# Patient Record
Sex: MALE | Race: WHITE | NOT HISPANIC OR LATINO | URBAN - METROPOLITAN AREA
[De-identification: names, ages, dates, MRNs, and addresses within clinical notes are randomized per-mention and may not be internally consistent; named-entity substitution may affect disease eponyms.]

---

## 2017-06-05 ENCOUNTER — OUTPATIENT (OUTPATIENT)
Dept: OUTPATIENT SERVICES | Facility: HOSPITAL | Age: 67
LOS: 1 days | Discharge: HOME | End: 2017-06-05

## 2017-06-05 DIAGNOSIS — K21.9 GASTRO-ESOPHAGEAL REFLUX DISEASE WITHOUT ESOPHAGITIS: ICD-10-CM

## 2017-06-05 DIAGNOSIS — E78.1 PURE HYPERGLYCERIDEMIA: ICD-10-CM

## 2017-06-05 DIAGNOSIS — N40.0 BENIGN PROSTATIC HYPERPLASIA WITHOUT LOWER URINARY TRACT SYMPTOMS: ICD-10-CM

## 2017-06-05 DIAGNOSIS — K85.90 ACUTE PANCREATITIS WITHOUT NECROSIS OR INFECTION, UNSPECIFIED: ICD-10-CM

## 2017-06-28 DIAGNOSIS — E55.9 VITAMIN D DEFICIENCY, UNSPECIFIED: ICD-10-CM

## 2017-06-28 DIAGNOSIS — E78.2 MIXED HYPERLIPIDEMIA: ICD-10-CM

## 2017-06-28 DIAGNOSIS — E11.9 TYPE 2 DIABETES MELLITUS WITHOUT COMPLICATIONS: ICD-10-CM

## 2017-11-20 ENCOUNTER — OUTPATIENT (OUTPATIENT)
Dept: OUTPATIENT SERVICES | Facility: HOSPITAL | Age: 67
LOS: 1 days | Discharge: HOME | End: 2017-11-20

## 2017-11-20 DIAGNOSIS — E78.1 PURE HYPERGLYCERIDEMIA: ICD-10-CM

## 2017-11-20 DIAGNOSIS — E78.2 MIXED HYPERLIPIDEMIA: ICD-10-CM

## 2017-11-20 DIAGNOSIS — E11.65 TYPE 2 DIABETES MELLITUS WITH HYPERGLYCEMIA: ICD-10-CM

## 2017-11-20 DIAGNOSIS — N40.0 BENIGN PROSTATIC HYPERPLASIA WITHOUT LOWER URINARY TRACT SYMPTOMS: ICD-10-CM

## 2017-11-20 DIAGNOSIS — K85.90 ACUTE PANCREATITIS WITHOUT NECROSIS OR INFECTION, UNSPECIFIED: ICD-10-CM

## 2017-11-20 DIAGNOSIS — E55.9 VITAMIN D DEFICIENCY, UNSPECIFIED: ICD-10-CM

## 2017-11-20 DIAGNOSIS — K21.9 GASTRO-ESOPHAGEAL REFLUX DISEASE WITHOUT ESOPHAGITIS: ICD-10-CM

## 2017-12-01 ENCOUNTER — OUTPATIENT (OUTPATIENT)
Dept: OUTPATIENT SERVICES | Facility: HOSPITAL | Age: 67
LOS: 1 days | Discharge: HOME | End: 2017-12-01

## 2017-12-01 DIAGNOSIS — K85.90 ACUTE PANCREATITIS WITHOUT NECROSIS OR INFECTION, UNSPECIFIED: ICD-10-CM

## 2017-12-01 DIAGNOSIS — E83.52 HYPERCALCEMIA: ICD-10-CM

## 2017-12-01 DIAGNOSIS — N40.0 BENIGN PROSTATIC HYPERPLASIA WITHOUT LOWER URINARY TRACT SYMPTOMS: ICD-10-CM

## 2017-12-01 DIAGNOSIS — K21.9 GASTRO-ESOPHAGEAL REFLUX DISEASE WITHOUT ESOPHAGITIS: ICD-10-CM

## 2017-12-01 DIAGNOSIS — E78.1 PURE HYPERGLYCERIDEMIA: ICD-10-CM

## 2017-12-15 ENCOUNTER — OUTPATIENT (OUTPATIENT)
Dept: OUTPATIENT SERVICES | Facility: HOSPITAL | Age: 67
LOS: 1 days | Discharge: HOME | End: 2017-12-15

## 2017-12-15 DIAGNOSIS — E78.1 PURE HYPERGLYCERIDEMIA: ICD-10-CM

## 2017-12-15 DIAGNOSIS — N40.0 BENIGN PROSTATIC HYPERPLASIA WITHOUT LOWER URINARY TRACT SYMPTOMS: ICD-10-CM

## 2017-12-15 DIAGNOSIS — K21.9 GASTRO-ESOPHAGEAL REFLUX DISEASE WITHOUT ESOPHAGITIS: ICD-10-CM

## 2017-12-15 DIAGNOSIS — E21.0 PRIMARY HYPERPARATHYROIDISM: ICD-10-CM

## 2017-12-15 DIAGNOSIS — K85.90 ACUTE PANCREATITIS WITHOUT NECROSIS OR INFECTION, UNSPECIFIED: ICD-10-CM

## 2018-02-06 ENCOUNTER — OUTPATIENT (OUTPATIENT)
Dept: OUTPATIENT SERVICES | Facility: HOSPITAL | Age: 68
LOS: 1 days | Discharge: HOME | End: 2018-02-06

## 2018-02-06 DIAGNOSIS — Z00.00 ENCOUNTER FOR GENERAL ADULT MEDICAL EXAMINATION WITHOUT ABNORMAL FINDINGS: ICD-10-CM

## 2018-05-02 ENCOUNTER — OUTPATIENT (OUTPATIENT)
Dept: OUTPATIENT SERVICES | Facility: HOSPITAL | Age: 68
LOS: 1 days | Discharge: HOME | End: 2018-05-02

## 2018-05-02 DIAGNOSIS — E07.9 DISORDER OF THYROID, UNSPECIFIED: ICD-10-CM

## 2018-07-10 ENCOUNTER — OUTPATIENT (OUTPATIENT)
Dept: OUTPATIENT SERVICES | Facility: HOSPITAL | Age: 68
LOS: 1 days | Discharge: HOME | End: 2018-07-10

## 2018-07-10 DIAGNOSIS — E21.0 PRIMARY HYPERPARATHYROIDISM: ICD-10-CM

## 2018-07-10 DIAGNOSIS — E55.9 VITAMIN D DEFICIENCY, UNSPECIFIED: ICD-10-CM

## 2018-07-10 DIAGNOSIS — E78.2 MIXED HYPERLIPIDEMIA: ICD-10-CM

## 2018-07-10 DIAGNOSIS — E11.9 TYPE 2 DIABETES MELLITUS WITHOUT COMPLICATIONS: ICD-10-CM

## 2019-01-29 ENCOUNTER — OUTPATIENT (OUTPATIENT)
Dept: OUTPATIENT SERVICES | Facility: HOSPITAL | Age: 69
LOS: 1 days | Discharge: HOME | End: 2019-01-29

## 2019-01-29 DIAGNOSIS — N39.0 URINARY TRACT INFECTION, SITE NOT SPECIFIED: ICD-10-CM

## 2019-01-29 DIAGNOSIS — Z13.1 ENCOUNTER FOR SCREENING FOR DIABETES MELLITUS: ICD-10-CM

## 2019-01-29 DIAGNOSIS — Z00.00 ENCOUNTER FOR GENERAL ADULT MEDICAL EXAMINATION WITHOUT ABNORMAL FINDINGS: ICD-10-CM

## 2019-01-29 DIAGNOSIS — E78.5 HYPERLIPIDEMIA, UNSPECIFIED: ICD-10-CM

## 2019-01-29 DIAGNOSIS — D51.8 OTHER VITAMIN B12 DEFICIENCY ANEMIAS: ICD-10-CM

## 2019-01-29 DIAGNOSIS — D53.9 NUTRITIONAL ANEMIA, UNSPECIFIED: ICD-10-CM

## 2019-01-29 DIAGNOSIS — E03.9 HYPOTHYROIDISM, UNSPECIFIED: ICD-10-CM

## 2019-01-29 DIAGNOSIS — E55.9 VITAMIN D DEFICIENCY, UNSPECIFIED: ICD-10-CM

## 2019-06-14 ENCOUNTER — OUTPATIENT (OUTPATIENT)
Dept: OUTPATIENT SERVICES | Facility: HOSPITAL | Age: 69
LOS: 1 days | Discharge: HOME | End: 2019-06-14

## 2019-06-14 DIAGNOSIS — E21.0 PRIMARY HYPERPARATHYROIDISM: ICD-10-CM

## 2019-06-14 DIAGNOSIS — E11.9 TYPE 2 DIABETES MELLITUS WITHOUT COMPLICATIONS: ICD-10-CM

## 2019-06-24 ENCOUNTER — OUTPATIENT (OUTPATIENT)
Dept: OUTPATIENT SERVICES | Facility: HOSPITAL | Age: 69
LOS: 1 days | Discharge: HOME | End: 2019-06-24

## 2019-06-24 DIAGNOSIS — E21.0 PRIMARY HYPERPARATHYROIDISM: ICD-10-CM

## 2019-07-25 ENCOUNTER — OUTPATIENT (OUTPATIENT)
Dept: OUTPATIENT SERVICES | Facility: HOSPITAL | Age: 69
LOS: 1 days | Discharge: HOME | End: 2019-07-25

## 2019-07-26 DIAGNOSIS — Z13.820 ENCOUNTER FOR SCREENING FOR OSTEOPOROSIS: ICD-10-CM

## 2019-07-26 DIAGNOSIS — F17.200 NICOTINE DEPENDENCE, UNSPECIFIED, UNCOMPLICATED: ICD-10-CM

## 2019-07-26 DIAGNOSIS — M89.9 DISORDER OF BONE, UNSPECIFIED: ICD-10-CM

## 2019-08-26 ENCOUNTER — OUTPATIENT (OUTPATIENT)
Dept: OUTPATIENT SERVICES | Facility: HOSPITAL | Age: 69
LOS: 1 days | Discharge: HOME | End: 2019-08-26

## 2019-08-26 DIAGNOSIS — E11.9 TYPE 2 DIABETES MELLITUS WITHOUT COMPLICATIONS: ICD-10-CM

## 2019-08-26 DIAGNOSIS — E55.9 VITAMIN D DEFICIENCY, UNSPECIFIED: ICD-10-CM

## 2019-08-26 DIAGNOSIS — E21.0 PRIMARY HYPERPARATHYROIDISM: ICD-10-CM

## 2019-08-26 DIAGNOSIS — E83.52 HYPERCALCEMIA: ICD-10-CM

## 2022-10-06 ENCOUNTER — NON-APPOINTMENT (OUTPATIENT)
Age: 72
End: 2022-10-06

## 2023-08-28 ENCOUNTER — INPATIENT (INPATIENT)
Facility: HOSPITAL | Age: 73
LOS: 2 days | Discharge: HOME | DRG: 445 | End: 2023-08-31
Attending: INTERNAL MEDICINE | Admitting: FAMILY MEDICINE
Payer: MEDICARE

## 2023-08-28 VITALS
WEIGHT: 195.11 LBS | TEMPERATURE: 101 F | SYSTOLIC BLOOD PRESSURE: 135 MMHG | RESPIRATION RATE: 20 BRPM | DIASTOLIC BLOOD PRESSURE: 70 MMHG | OXYGEN SATURATION: 95 %

## 2023-08-28 DIAGNOSIS — R10.9 UNSPECIFIED ABDOMINAL PAIN: ICD-10-CM

## 2023-08-28 LAB
ALBUMIN SERPL ELPH-MCNC: 4.6 G/DL — SIGNIFICANT CHANGE UP (ref 3.5–5.2)
ALP SERPL-CCNC: 32 U/L — SIGNIFICANT CHANGE UP (ref 30–115)
ALT FLD-CCNC: 23 U/L — SIGNIFICANT CHANGE UP (ref 0–41)
ANION GAP SERPL CALC-SCNC: 16 MMOL/L — HIGH (ref 7–14)
APPEARANCE UR: ABNORMAL
AST SERPL-CCNC: 26 U/L — SIGNIFICANT CHANGE UP (ref 0–41)
BACTERIA # UR AUTO: ABNORMAL /HPF
BASE EXCESS BLDV CALC-SCNC: 2.2 MMOL/L — SIGNIFICANT CHANGE UP (ref -2–3)
BASOPHILS # BLD AUTO: 0.05 K/UL — SIGNIFICANT CHANGE UP (ref 0–0.2)
BASOPHILS NFR BLD AUTO: 0.5 % — SIGNIFICANT CHANGE UP (ref 0–1)
BILIRUB SERPL-MCNC: 0.5 MG/DL — SIGNIFICANT CHANGE UP (ref 0.2–1.2)
BILIRUB UR-MCNC: ABNORMAL
BUN SERPL-MCNC: 19 MG/DL — SIGNIFICANT CHANGE UP (ref 10–20)
CA-I SERPL-SCNC: 1.25 MMOL/L — SIGNIFICANT CHANGE UP (ref 1.15–1.33)
CALCIUM SERPL-MCNC: 10.9 MG/DL — HIGH (ref 8.4–10.5)
CHLORIDE SERPL-SCNC: 94 MMOL/L — LOW (ref 98–110)
CO2 SERPL-SCNC: 24 MMOL/L — SIGNIFICANT CHANGE UP (ref 17–32)
COLOR SPEC: SIGNIFICANT CHANGE UP
CREAT SERPL-MCNC: 0.9 MG/DL — SIGNIFICANT CHANGE UP (ref 0.7–1.5)
DIFF PNL FLD: NEGATIVE — SIGNIFICANT CHANGE UP
EGFR: 90 ML/MIN/1.73M2 — SIGNIFICANT CHANGE UP
EOSINOPHIL # BLD AUTO: 0 K/UL — SIGNIFICANT CHANGE UP (ref 0–0.7)
EOSINOPHIL NFR BLD AUTO: 0 % — SIGNIFICANT CHANGE UP (ref 0–8)
EPI CELLS # UR: PRESENT
FLUAV AG NPH QL: SIGNIFICANT CHANGE UP
FLUBV AG NPH QL: SIGNIFICANT CHANGE UP
GAS PNL BLDV: 131 MMOL/L — LOW (ref 136–145)
GAS PNL BLDV: SIGNIFICANT CHANGE UP
GLUCOSE SERPL-MCNC: 124 MG/DL — HIGH (ref 70–99)
GLUCOSE UR QL: NEGATIVE MG/DL — SIGNIFICANT CHANGE UP
GRAN CASTS # UR COMP ASSIST: PRESENT
HCO3 BLDV-SCNC: 27 MMOL/L — SIGNIFICANT CHANGE UP (ref 22–29)
HCT VFR BLD CALC: 45.3 % — SIGNIFICANT CHANGE UP (ref 42–52)
HCT VFR BLDA CALC: 41 % — SIGNIFICANT CHANGE UP (ref 39–51)
HGB BLD CALC-MCNC: 13.5 G/DL — SIGNIFICANT CHANGE UP (ref 12.6–17.4)
HGB BLD-MCNC: 15.2 G/DL — SIGNIFICANT CHANGE UP (ref 14–18)
HYALINE CASTS # UR AUTO: 7 — SIGNIFICANT CHANGE UP
IMM GRANULOCYTES NFR BLD AUTO: 0.3 % — SIGNIFICANT CHANGE UP (ref 0.1–0.3)
KETONES UR-MCNC: ABNORMAL MG/DL
LACTATE BLDV-MCNC: 1.3 MMOL/L — SIGNIFICANT CHANGE UP (ref 0.5–2)
LACTATE SERPL-SCNC: 1.4 MMOL/L — SIGNIFICANT CHANGE UP (ref 0.7–2)
LACTATE SERPL-SCNC: 4.3 MMOL/L — CRITICAL HIGH (ref 0.7–2)
LEUKOCYTE ESTERASE UR-ACNC: ABNORMAL
LIDOCAIN IGE QN: 20 U/L — SIGNIFICANT CHANGE UP (ref 7–60)
LYMPHOCYTES # BLD AUTO: 0.88 K/UL — LOW (ref 1.2–3.4)
LYMPHOCYTES # BLD AUTO: 8.4 % — LOW (ref 20.5–51.1)
MCHC RBC-ENTMCNC: 29.2 PG — SIGNIFICANT CHANGE UP (ref 27–31)
MCHC RBC-ENTMCNC: 33.6 G/DL — SIGNIFICANT CHANGE UP (ref 32–37)
MCV RBC AUTO: 87.1 FL — SIGNIFICANT CHANGE UP (ref 80–94)
MONOCYTES # BLD AUTO: 1.15 K/UL — HIGH (ref 0.1–0.6)
MONOCYTES NFR BLD AUTO: 11 % — HIGH (ref 1.7–9.3)
MUCOUS THREADS # UR AUTO: PRESENT
NEUTROPHILS # BLD AUTO: 8.31 K/UL — HIGH (ref 1.4–6.5)
NEUTROPHILS NFR BLD AUTO: 79.8 % — HIGH (ref 42.2–75.2)
NITRITE UR-MCNC: NEGATIVE — SIGNIFICANT CHANGE UP
NRBC # BLD: 0 /100 WBCS — SIGNIFICANT CHANGE UP (ref 0–0)
PCO2 BLDV: 43 MMHG — SIGNIFICANT CHANGE UP (ref 42–55)
PH BLDV: 7.41 — SIGNIFICANT CHANGE UP (ref 7.32–7.43)
PH UR: 6 — SIGNIFICANT CHANGE UP (ref 5–8)
PLATELET # BLD AUTO: 187 K/UL — SIGNIFICANT CHANGE UP (ref 130–400)
PMV BLD: 12.6 FL — HIGH (ref 7.4–10.4)
PO2 BLDV: 34 MMHG — SIGNIFICANT CHANGE UP
POTASSIUM BLDV-SCNC: 4.4 MMOL/L — SIGNIFICANT CHANGE UP (ref 3.5–5.1)
POTASSIUM SERPL-MCNC: 4.8 MMOL/L — SIGNIFICANT CHANGE UP (ref 3.5–5)
POTASSIUM SERPL-SCNC: 4.8 MMOL/L — SIGNIFICANT CHANGE UP (ref 3.5–5)
PROT SERPL-MCNC: 7.5 G/DL — SIGNIFICANT CHANGE UP (ref 6–8)
PROT UR-MCNC: 30 MG/DL
RBC # BLD: 5.2 M/UL — SIGNIFICANT CHANGE UP (ref 4.7–6.1)
RBC # FLD: 13.1 % — SIGNIFICANT CHANGE UP (ref 11.5–14.5)
RBC CASTS # UR COMP ASSIST: 3 /HPF — SIGNIFICANT CHANGE UP (ref 0–4)
RSV RNA NPH QL NAA+NON-PROBE: SIGNIFICANT CHANGE UP
SAO2 % BLDV: 55.1 % — SIGNIFICANT CHANGE UP
SARS-COV-2 RNA SPEC QL NAA+PROBE: SIGNIFICANT CHANGE UP
SODIUM SERPL-SCNC: 134 MMOL/L — LOW (ref 135–146)
SP GR SPEC: 1.03 — SIGNIFICANT CHANGE UP (ref 1–1.03)
SPERM AB SPEC-ACNC: PRESENT
TROPONIN T SERPL-MCNC: <0.01 NG/ML — SIGNIFICANT CHANGE UP
UROBILINOGEN FLD QL: 1 MG/DL — SIGNIFICANT CHANGE UP (ref 0.2–1)
WBC # BLD: 10.42 K/UL — SIGNIFICANT CHANGE UP (ref 4.8–10.8)
WBC # FLD AUTO: 10.42 K/UL — SIGNIFICANT CHANGE UP (ref 4.8–10.8)
WBC UR QL: 3 /HPF — SIGNIFICANT CHANGE UP (ref 0–5)

## 2023-08-28 PROCEDURE — 83690 ASSAY OF LIPASE: CPT

## 2023-08-28 PROCEDURE — 71045 X-RAY EXAM CHEST 1 VIEW: CPT | Mod: 26

## 2023-08-28 PROCEDURE — 80053 COMPREHEN METABOLIC PANEL: CPT

## 2023-08-28 PROCEDURE — 93005 ELECTROCARDIOGRAM TRACING: CPT

## 2023-08-28 PROCEDURE — 86901 BLOOD TYPING SEROLOGIC RH(D): CPT

## 2023-08-28 PROCEDURE — 85025 COMPLETE CBC W/AUTO DIFF WBC: CPT

## 2023-08-28 PROCEDURE — 83735 ASSAY OF MAGNESIUM: CPT

## 2023-08-28 PROCEDURE — 83605 ASSAY OF LACTIC ACID: CPT

## 2023-08-28 PROCEDURE — 83036 HEMOGLOBIN GLYCOSYLATED A1C: CPT

## 2023-08-28 PROCEDURE — 86850 RBC ANTIBODY SCREEN: CPT

## 2023-08-28 PROCEDURE — 82962 GLUCOSE BLOOD TEST: CPT

## 2023-08-28 PROCEDURE — 84484 ASSAY OF TROPONIN QUANT: CPT

## 2023-08-28 PROCEDURE — 99497 ADVNCD CARE PLAN 30 MIN: CPT | Mod: 25

## 2023-08-28 PROCEDURE — 85610 PROTHROMBIN TIME: CPT

## 2023-08-28 PROCEDURE — 93010 ELECTROCARDIOGRAM REPORT: CPT | Mod: 76

## 2023-08-28 PROCEDURE — 78226 HEPATOBILIARY SYSTEM IMAGING: CPT

## 2023-08-28 PROCEDURE — 85027 COMPLETE CBC AUTOMATED: CPT

## 2023-08-28 PROCEDURE — 84100 ASSAY OF PHOSPHORUS: CPT

## 2023-08-28 PROCEDURE — 76705 ECHO EXAM OF ABDOMEN: CPT | Mod: 26

## 2023-08-28 PROCEDURE — 36415 COLL VENOUS BLD VENIPUNCTURE: CPT

## 2023-08-28 PROCEDURE — 74177 CT ABD & PELVIS W/CONTRAST: CPT | Mod: 26,MA

## 2023-08-28 PROCEDURE — 86900 BLOOD TYPING SEROLOGIC ABO: CPT

## 2023-08-28 PROCEDURE — 99285 EMERGENCY DEPT VISIT HI MDM: CPT

## 2023-08-28 PROCEDURE — 99223 1ST HOSP IP/OBS HIGH 75: CPT

## 2023-08-28 PROCEDURE — 93306 TTE W/DOPPLER COMPLETE: CPT

## 2023-08-28 PROCEDURE — 84443 ASSAY THYROID STIM HORMONE: CPT

## 2023-08-28 PROCEDURE — A9537: CPT

## 2023-08-28 PROCEDURE — 86803 HEPATITIS C AB TEST: CPT

## 2023-08-28 RX ORDER — ASPIRIN/CALCIUM CARB/MAGNESIUM 324 MG
0 TABLET ORAL
Refills: 0 | DISCHARGE

## 2023-08-28 RX ORDER — ACETAMINOPHEN 500 MG
650 TABLET ORAL EVERY 6 HOURS
Refills: 0 | Status: DISCONTINUED | OUTPATIENT
Start: 2023-08-28 | End: 2023-08-31

## 2023-08-28 RX ORDER — DEXTROSE 50 % IN WATER 50 %
12.5 SYRINGE (ML) INTRAVENOUS ONCE
Refills: 0 | Status: DISCONTINUED | OUTPATIENT
Start: 2023-08-28 | End: 2023-08-31

## 2023-08-28 RX ORDER — ONDANSETRON 8 MG/1
4 TABLET, FILM COATED ORAL EVERY 8 HOURS
Refills: 0 | Status: DISCONTINUED | OUTPATIENT
Start: 2023-08-28 | End: 2023-08-31

## 2023-08-28 RX ORDER — SODIUM CHLORIDE 9 MG/ML
1000 INJECTION, SOLUTION INTRAVENOUS
Refills: 0 | Status: DISCONTINUED | OUTPATIENT
Start: 2023-08-28 | End: 2023-08-31

## 2023-08-28 RX ORDER — PANTOPRAZOLE SODIUM 20 MG/1
40 TABLET, DELAYED RELEASE ORAL
Refills: 0 | Status: DISCONTINUED | OUTPATIENT
Start: 2023-08-28 | End: 2023-08-31

## 2023-08-28 RX ORDER — ACETAMINOPHEN 500 MG
975 TABLET ORAL ONCE
Refills: 0 | Status: COMPLETED | OUTPATIENT
Start: 2023-08-28 | End: 2023-08-28

## 2023-08-28 RX ORDER — ASPIRIN/CALCIUM CARB/MAGNESIUM 324 MG
81 TABLET ORAL DAILY
Refills: 0 | Status: DISCONTINUED | OUTPATIENT
Start: 2023-08-28 | End: 2023-08-31

## 2023-08-28 RX ORDER — DEXTROSE 50 % IN WATER 50 %
25 SYRINGE (ML) INTRAVENOUS ONCE
Refills: 0 | Status: DISCONTINUED | OUTPATIENT
Start: 2023-08-28 | End: 2023-08-31

## 2023-08-28 RX ORDER — PIPERACILLIN AND TAZOBACTAM 4; .5 G/20ML; G/20ML
3.38 INJECTION, POWDER, LYOPHILIZED, FOR SOLUTION INTRAVENOUS EVERY 8 HOURS
Refills: 0 | Status: DISCONTINUED | OUTPATIENT
Start: 2023-08-29 | End: 2023-08-31

## 2023-08-28 RX ORDER — PIPERACILLIN AND TAZOBACTAM 4; .5 G/20ML; G/20ML
3.38 INJECTION, POWDER, LYOPHILIZED, FOR SOLUTION INTRAVENOUS ONCE
Refills: 0 | Status: COMPLETED | OUTPATIENT
Start: 2023-08-28 | End: 2023-08-28

## 2023-08-28 RX ORDER — SODIUM CHLORIDE 9 MG/ML
1000 INJECTION INTRAMUSCULAR; INTRAVENOUS; SUBCUTANEOUS ONCE
Refills: 0 | Status: COMPLETED | OUTPATIENT
Start: 2023-08-28 | End: 2023-08-28

## 2023-08-28 RX ORDER — LANOLIN ALCOHOL/MO/W.PET/CERES
5 CREAM (GRAM) TOPICAL AT BEDTIME
Refills: 0 | Status: DISCONTINUED | OUTPATIENT
Start: 2023-08-28 | End: 2023-08-31

## 2023-08-28 RX ORDER — ATORVASTATIN CALCIUM 80 MG/1
20 TABLET, FILM COATED ORAL AT BEDTIME
Refills: 0 | Status: DISCONTINUED | OUTPATIENT
Start: 2023-08-28 | End: 2023-08-31

## 2023-08-28 RX ORDER — ASPIRIN/CALCIUM CARB/MAGNESIUM 324 MG
1 TABLET ORAL
Refills: 0 | DISCHARGE

## 2023-08-28 RX ORDER — DEXTROSE 50 % IN WATER 50 %
15 SYRINGE (ML) INTRAVENOUS ONCE
Refills: 0 | Status: DISCONTINUED | OUTPATIENT
Start: 2023-08-28 | End: 2023-08-31

## 2023-08-28 RX ORDER — PIOGLITAZONE HYDROCHLORIDE 15 MG/1
1 TABLET ORAL
Refills: 0 | DISCHARGE

## 2023-08-28 RX ORDER — INSULIN LISPRO 100/ML
VIAL (ML) SUBCUTANEOUS
Refills: 0 | Status: DISCONTINUED | OUTPATIENT
Start: 2023-08-28 | End: 2023-08-31

## 2023-08-28 RX ORDER — GABAPENTIN 400 MG/1
100 CAPSULE ORAL DAILY
Refills: 0 | Status: DISCONTINUED | OUTPATIENT
Start: 2023-08-28 | End: 2023-08-31

## 2023-08-28 RX ORDER — MECLIZINE HCL 12.5 MG
12.5 TABLET ORAL EVERY 8 HOURS
Refills: 0 | Status: DISCONTINUED | OUTPATIENT
Start: 2023-08-28 | End: 2023-08-29

## 2023-08-28 RX ORDER — GLUCAGON INJECTION, SOLUTION 0.5 MG/.1ML
1 INJECTION, SOLUTION SUBCUTANEOUS ONCE
Refills: 0 | Status: DISCONTINUED | OUTPATIENT
Start: 2023-08-28 | End: 2023-08-31

## 2023-08-28 RX ORDER — ACETAMINOPHEN 500 MG
650 TABLET ORAL ONCE
Refills: 0 | Status: COMPLETED | OUTPATIENT
Start: 2023-08-28 | End: 2023-08-28

## 2023-08-28 RX ADMIN — Medication 650 MILLIGRAM(S): at 16:46

## 2023-08-28 RX ADMIN — Medication 975 MILLIGRAM(S): at 13:32

## 2023-08-28 RX ADMIN — SODIUM CHLORIDE 1000 MILLILITER(S): 9 INJECTION INTRAMUSCULAR; INTRAVENOUS; SUBCUTANEOUS at 11:47

## 2023-08-28 RX ADMIN — Medication 975 MILLIGRAM(S): at 11:27

## 2023-08-28 RX ADMIN — SODIUM CHLORIDE 1000 MILLILITER(S): 9 INJECTION INTRAMUSCULAR; INTRAVENOUS; SUBCUTANEOUS at 11:24

## 2023-08-28 RX ADMIN — PIPERACILLIN AND TAZOBACTAM 200 GRAM(S): 4; .5 INJECTION, POWDER, LYOPHILIZED, FOR SOLUTION INTRAVENOUS at 16:43

## 2023-08-28 NOTE — ED ADULT NURSE NOTE - NSFALLUNIVINTERV_ED_ALL_ED
Bed/Stretcher in lowest position, wheels locked, appropriate side rails in place/Call bell, personal items and telephone in reach/Instruct patient to call for assistance before getting out of bed/chair/stretcher/Non-slip footwear applied when patient is off stretcher/[place] to call system/Physically safe environment - no spills, clutter or unnecessary equipment/Purposeful proactive rounding/Room/bathroom lighting operational, light cord in reach

## 2023-08-28 NOTE — H&P ADULT - NSHPLABSRESULTS_GEN_ALL_CORE
15.2   10.42 )-----------( 187      ( 28 Aug 2023 10:30 )             45.3       08-28    134<L>  |  94<L>  |  19  ----------------------------<  124<H>  4.8   |  24  |  0.9    Ca    10.9<H>      28 Aug 2023 10:30    TPro  7.5  /  Alb  4.6  /  TBili  0.5  /  DBili  x   /  AST  26  /  ALT  23  /  AlkPhos  32  08-28        Urinalysis Basic - ( 28 Aug 2023 10:30 )    Color: x / Appearance: x / SG: x / pH: x  Gluc: 124 mg/dL / Ketone: x  / Bili: x / Urobili: x   Blood: x / Protein: x / Nitrite: x   Leuk Esterase: x / RBC: x / WBC x   Sq Epi: x / Non Sq Epi: x / Bacteria: x  Lactate Trend  08-28 @ 13:32 Lactate:1.4   08-28 @ 10:30 Lactate:4.3       CARDIAC MARKERS ( 28 Aug 2023 10:30 )  x     / <0.01 ng/mL / x     / x     / x          CT IMPRESSION:    Cholelithiasis with mild gallbladder wall thickening. Findings are   indeterminate. Recommend abdominal ultrasound for further evaluation.  FINDINGS:  Liver: Echogenic liver compatible with steatosis. Hypoechoic region along   the gallbladder likely representing focal fatty sparing.  Bile ducts: Normal caliber. Common bile duct measures 5 mm.  Gallbladder: Cholelithiasis and sludge. Negative sonographic Garcia's   sign.  Pancreas: Poorly visualized.  Right kidney: 13.4 cm. No hydronephrosis. 3.7 cm cyst.  Ascites: None.      US IIMPRESSION:    Cholelithiasis and sludge without sonographic evidence of acute   cholecystitis

## 2023-08-28 NOTE — ED PROVIDER NOTE - ATTENDING APP SHARED VISIT CONTRIBUTION OF CARE
72-year-old male history of diabetes GERD dyslipidemia presents valuation of abdominal fullness fever.  The nurse triage note documents chest pain patient currently has no chest pain that was previously.  He on exam patient in no distress S1-S2 clear to auscultation bilaterally mild abdominal signs with no appreciated tenderness neurologic grossly intact  Impression  Patient with fever of unclear etiology as well as with abdominal discomfort.  Here white count 10 initial lactate of 4.3 with repeat within normal limits.  UA with no signs of infection and chest x-ray within normal limits.  CT on pelvis demonstrated cholelithiasis and mild gallbladder wall thickening which are indeterminate.  Patient is pending right quadrant ultrasound and possible surgery consult.

## 2023-08-28 NOTE — H&P ADULT - NSHPPHYSICALEXAM_GEN_ALL_CORE
GENERAL:  71y/o Male NAD, resting comfortably.  HEAD:  Atraumatic, Normocephalic  EYES: EOMI, PERRLA, conjunctiva and sclera clear  NECK: Supple, No JVD, no cervical lymphadenopathy, non-tender  CHEST/LUNG: Clear to auscultation bilaterally; No wheeze, rhonchi, or rales  HEART: Regular rate and rhythm; S1&S2  ABDOMEN: Soft, mildly tender on mid and right upper quadrant, Nondistended x 4 quadrants; Bowel sounds present  EXTREMITIES:   Peripheral Pulses Present, No clubbing, no cyanosis, or no edema, no calf tenderness  PSYCH: AAOx3, cooperative, appropriate  NEUROLOGY: WNL  SKIN: WNL Vital Signs Last 24 Hrs  T(C): 36.3 (28 Aug 2023 22:56), Max: 38.4 (28 Aug 2023 09:50)  T(F): 97.4 (28 Aug 2023 22:56), Max: 101.2 (28 Aug 2023 09:50)  HR: 79 (28 Aug 2023 22:56) (67 - 79)  BP: 178/91 (28 Aug 2023 22:56) (119/63 - 178/91)  BP(mean): --  RR: 18 (28 Aug 2023 22:56) (18 - 22)  SpO2: 95% (28 Aug 2023 22:56) (95% - 96%)    Parameters below as of 28 Aug 2023 22:56  Patient On (Oxygen Delivery Method): room air          GENERAL:  71y/o Male NAD, resting comfortably.  HEAD:  Atraumatic, Normocephalic  EYES: EOMI, PERRLA, conjunctiva and sclera clear  NECK: Supple, No JVD, no cervical lymphadenopathy, non-tender  CHEST/LUNG: Clear to auscultation bilaterally; No wheeze, rhonchi, or rales  HEART: Regular rate and rhythm; S1&S2  ABDOMEN: Soft, mildly tender on mid and right upper quadrant, Nondistended x 4 quadrants; Bowel sounds present  EXTREMITIES:   Peripheral Pulses Present, No clubbing, no cyanosis, or no edema, no calf tenderness  PSYCH: AAOx3, cooperative, appropriate  NEUROLOGY: WNL  SKIN: WNL

## 2023-08-28 NOTE — H&P ADULT - ASSESSMENT
DX cholelitiasis:  Gi consult  NPO  ABX Zosyn IV started in ED    continue home medications as PMD:          Prophylaxis Gi/VTE    CAse D/W Dr Cope 71 y/o male  seen at bed side with surgical PA . Pt with medical history of Type 2 diabetes, High cholesterol, GERD, Hx of Pancreatitis, Everyday heavy smoker (2-3 PPD), and peripheral neuropathy who presents with his wife to the ER with c/o now resolved chest pain, epigastric pain and fever to 102 at home and constipation x 4 days.   Patient reports the chest pain and epigastric pain started 3-4 days ago and now has been resolved for over 24 hours but he developed a fever to 102 at home so his wife brought him to the ER for evaluation.    Patient reports a history of Pancreatitis x 2 but was told they could not find a cause.  Patient denies being told he had gallstones in the past,  patient denies alcohol use.  He reports Meclizine may had caused one episode of pancreatitis.    Patient reports the pain has now resolved > 24 hours but was located in epigastric area with radiation towards mid chest.    Pain was characterized as sharp in nature and was 8/10.      DX cholelitiasis:  Gi consult  NPO  ABX Zosyn IV started in ED    continue home medications as PMD:          Prophylaxis Gi/VTE    CAse D/W Dr Cope Patient is 71 y/o male  with pMHX of Type 2 diabetes, High cholesterol, GERD, Hx of Pancreatitis, Everyday heavy smoker (2-3 PPD), and peripheral neuropathy who presents with       #Abdm pain/fever  #lactic acidosis resolved  -Abdm CT scan shows Cholelithiasis with mild gallbladder wall thickening. Findings are indeterminate. Recommend abdominal ultrasound for further evaluation.  Essentially stable splenomegaly.  -abdm US shows Cholelithiasis and sludge without sonographic evidence of acute cholecystitis.  Hepatic steatosis  -RVP negative   -NPO  -IVF  -IV antibiotic pending cultures  -GI and surgery consult   -HIDA scan     #DM2  -continue with lantus, and ISS  -Monitor POC     #HLD/neuropathy/GERD  -continue with home medications        #Progress Note Handoff  Pending (specify):  as above   Family discussion:  plan of care was discussed with patient   in details.  all questions were answered.  seems to understand, and in agreement  Disposition:  home          Patient is 71 y/o male  with pMHX of Type 2 diabetes, High cholesterol, GERD, Hx of Pancreatitis, Everyday heavy smoker (2-3 PPD), and peripheral neuropathy who presents with       #Abdm pain/fever  #lactic acidosis resolved  -Abdm CT scan shows Cholelithiasis with mild gallbladder wall thickening. Findings are indeterminate. Recommend abdominal ultrasound for further evaluation.  Essentially stable splenomegaly.  -abdm US shows Cholelithiasis and sludge without sonographic evidence of acute cholecystitis.  Hepatic steatosis  -RVP negative   -NPO  -IVF  -IV antibiotic pending cultures  -GI and surgery consult   -HIDA scan     #DM2  -continue with lantus, and ISS  -Monitor POC     #HLD/neuropathy/GERD  -continue with home medications      #Chest pain  -EKG shows no acute ischemic change  -Trop negative x1  -Will obtain serial trop, TTE      #Progress Note Handoff  Pending (specify):  as above   Family discussion:  plan of care was discussed with patient   in details.  all questions were answered.  seems to understand, and in agreement  Disposition:  home          Patient is 71 y/o male  with pMHX of Type 2 diabetes, High cholesterol, GERD, Hx of Pancreatitis, Everyday heavy smoker (2-3 PPD), and peripheral neuropathy who presents with       #Abdm pain/fever  #lactic acidosis resolved  -Abdm CT scan shows Cholelithiasis with mild gallbladder wall thickening. Findings are indeterminate. Recommend abdominal ultrasound for further evaluation.  Essentially stable splenomegaly.  -abdm US shows Cholelithiasis and sludge without sonographic evidence of acute cholecystitis.  Hepatic steatosis  -RVP negative   -NPO  -IVF  -IV antibiotic pending cultures  -GI and surgery consult   -HIDA scan     #DM2  -Hold oral medications,  ISS  -Monitor POC     #HLD/neuropathy/GERD  -continue with home medications      #Chest pain  -EKG shows no acute ischemic change  -Trop negative x1  -Will obtain serial trop, TTE      #Progress Note Handoff  Pending (specify):  as above   Family discussion:  plan of care was discussed with patient   in details.  all questions were answered.  seems to understand, and in agreement  Disposition:  home          Patient is 71 y/o male  with pMHX of Type 2 diabetes, High cholesterol, GERD, Hx of Pancreatitis, Everyday heavy smoker (2-3 PPD), and peripheral neuropathy who presents with       #Abdm pain/fever  #lactic acidosis resolved  -Abdm CT scan shows Cholelithiasis with mild gallbladder wall thickening. Findings are indeterminate. Recommend abdominal ultrasound for further evaluation.  Essentially stable splenomegaly.  -abdm US shows Cholelithiasis and sludge without sonographic evidence of acute cholecystitis.  Hepatic steatosis  -RVP negative   -NPO  -IVF  -IV antibiotic pending cultures  -GI and surgery consult   -HIDA scan     #DM2  -Hold oral medications,  ISS  -Monitor POC   FS AC/HS   insulin scale    #HLD/neuropathy/GERD  -continue with home medications      #Chest pain  -EKG shows no acute ischemic change  -Trop negative x1  -Will obtain serial trop, TTE      #Progress Note Handoff  Pending (specify):  as above   Family discussion:  plan of care was discussed with patient   in details.  all questions were answered.  seems to understand, and in agreement  Disposition:  home

## 2023-08-28 NOTE — ED PROVIDER NOTE - PHYSICAL EXAMINATION
CONST: Well appearing in NAD  EYES: PERRL, EOMI, Sclera and conjunctiva clear.   ENT: No nasal discharge. Oropharynx normal appearing  NECK: Non-tender, no meningeal signs. normal ROM. supple   CARD: Normal S1 S2; Normal rate and rhythm  RESP: Equal BS B/L, No wheezes, rhonchi or rales. No distress  GI: Soft, abdominal tenderness, non-distended. no cva tenderness. normal BS  MS: Normal ROM in all extremities. No midline spinal tenderness. pulses 2 +. no calf tenderness or swelling  SKIN: Warm, dry, no acute rashes. Good turgor  NEURO: A&Ox3, No focal deficits.

## 2023-08-28 NOTE — CONSULT NOTE ADULT - CONSULT REASON
Abdominal pain, possible Crohn's exacerbation vs early PSBO Fever, resolved epigastric pain Fever, epigastric pain (now resolved)

## 2023-08-28 NOTE — ED ADULT NURSE NOTE - NSICDXPASTMEDICALHX_GEN_ALL_CORE_FT
PAST MEDICAL HISTORY:  Acid reflux     BP (high blood pressure)     Elevated cholesterol with high triglycerides     High cholesterol     Pancreatitis

## 2023-08-28 NOTE — CONSULT NOTE ADULT - NSCONSULTADDITIONALINFOA_GEN_ALL_CORE
I have seen and examined the patient.  In addition I note the following.  The patient has a history of previous episodes of pancreatitis which he had yesterday.  He also has gallstones.  He had an episode of abdominal pain discomfort nausea vomiting couple weeks back which certainly sounds like a biliary colic type episode.  He has no evidence for other GI pathology clinically or per history at this time.  His HIDA scan is positive with nonfilling of the gallbladder.  I certainly think that his sense of anorexia may indeed be related to this and I will offer him cholecystectomy.  We will plan to discuss it further tomorrow.  GI and medicine notes appreciated.  Thank you very much for allowing me to assist in the care of this gentleman.

## 2023-08-28 NOTE — PATIENT PROFILE ADULT - FALL HARM RISK - UNIVERSAL INTERVENTIONS
Bed in lowest position, wheels locked, appropriate side rails in place/Call bell, personal items and telephone in reach/Instruct patient to call for assistance before getting out of bed or chair/Non-slip footwear when patient is out of bed/[place] to call system/Physically safe environment - no spills, clutter or unnecessary equipment/Purposeful Proactive Rounding/Room/bathroom lighting operational, light cord in reach

## 2023-08-28 NOTE — ED PROVIDER NOTE - CLINICAL SUMMARY MEDICAL DECISION MAKING FREE TEXT BOX
Pt evaluated after sign out. Comfortable. Work up reviewed. Will admit for GI consult, HIDA scan and treatment.

## 2023-08-28 NOTE — ED PROVIDER NOTE - OBJECTIVE STATEMENT
72 year old male with pmhx of dm, hld, gerd, pancreatitis, peripheral neuropathy presents to ed with chest pain, epigastric pain and fever. Pt admits had chest pain a few days ago which has now revolved. abdominal pain is to diffuse abdomen associated with bloating x 4 days. + fever at home today of 102. Pt denies nausea, vomiting, diarrhea, urinary symptoms, cough, congestion, recent travel or sick contacts.

## 2023-08-28 NOTE — H&P ADULT - HISTORY OF PRESENT ILLNESS
72-year-old male history of diabetes GERD dyslipidemia presents valuation of abdominal fullness fever.  The nurse triage note documents chest pain patient currently has no chest pain    73 y/o male  seen at bed side with surgical PA . Pt with medical history of Type 2 diabetes, High cholesterol, GERD, Hx of Pancreatitis, Everyday heavy smoker (2-3 PPD), and peripheral neuropathy who presents with his wife to the ER with c/o now resolved chest pain, epigastric pain and fever to 102 at home and constipation x 4 days.   Patient reports the chest pain and epigastric pain started 3-4 days ago and now has been resolved for over 24 hours but he developed a fever to 102 at home so his wife brought him to the ER for evaluation.    Patient reports a history of Pancreatitis x 2 but was told they could not find a cause.  Patient denies being told he had gallstones in the past,  patient denies alcohol use.  He reports Meclizine may had caused one episode of pancreatitis.    Patient reports the pain has now resolved > 24 hours but was located in epigastric area with radiation towards mid chest.    Pain was characterized as sharp in nature and was 8/10.    Patient though he was having another episode of pancreatitis.    Patient denies N/V/D, hematemesis, hematochezia, melena, urinary complaints or SO Patient is 71 y/o male with past medical  history of Type 2 diabetes, High cholesterol, GERD, Hx of Pancreatitis, Everyday heavy smoker (2-3 PPD), and peripheral neuropathy who presents with his wife to the ER with c/o now resolved chest pain, epigastric pain and fever to 102 at home and constipation x 4 days.   Patient reports the chest pain and epigastric pain started 3-4 days ago and now has been resolved for over 24 hours but he developed a fever to 102 at home so his wife brought him to the ER for evaluation.    Patient reports a history of Pancreatitis x 2 but was told they could not find a cause.  Patient denies being told he had gallstones in the past,  patient denies alcohol use.  He reports Meclizine may had caused one episode of pancreatitis.    Patient reports the pain has now resolved > 24 hours but was located in epigastric area with radiation towards mid chest.    Pain was characterized as sharp in nature and was 8/10.        Patient denies N/V/D, hematemesis, hematochezia, melena, urinary complaints or SO

## 2023-08-28 NOTE — CONSULT NOTE ADULT - ASSESSMENT
.  Impression:  Patient is a 73 y/o male with medical history of Type 2 diabetes, High cholesterol, GERD, Hx of Pancreatitis, Everyday heavy smoker (2-3 PPD), and peripheral neuropathy who presents with his wife to the ER with c/o now resolved chest pain, epigastric pain and fever to 102 at home and constipation x 4 days.  # Cholelithiasis and sludge without evidence of acute cholecystitis.  # Fever         Plan:   # Cholelithiasis and sludge without evidence of acute cholecystitis.  # Fever   - NPO except medications.  - IV hydration.   - Empiric antibiotics.  - Follow blood cultures taken in ER today.   - Monitor WBC count and LFT's.  - Pain medications as needed.   - Case d/w Dr. Marcelino and recommends admit to medical service for fever work up and management of his diabetes and other comorbidities and also recommends a HIDA scan to r/o acute cholecystitis.     - Dr. Marcelino to follow.  - Patient verbalizes understanding of plan and all questions answered to patient's satisfaction.   - Dr. Marcelino's recommendations relayed to ER PA.

## 2023-08-28 NOTE — CONSULT NOTE ADULT - SUBJECTIVE AND OBJECTIVE BOX
.  Patient is a 73 y/o male with medical history of Type 2 diabetes, High cholesterol, GERD, Hx of Pancreatitis, Everyday heavy smoker (2-3 PPD), and peripheral neuropathy who presents with his wife to the ER with c/o now resolved chest pain, epigastric pain and fever to 102 at home and constipation x 4 days.   Patient reports the chest pain and epigastric pain started 3-4 days ago and now has been resolved for over 24 hours but he developed a fever to 102 at home so his wife brought him to the ER for evaluation.    Patient reports a history of Pancreatitis x 2 but was told they could not find a cause.  Patient denies being told he had gallstones in the past,  patient denies alcohol use.  He reports Meclizine may had caused one episode of pancreatitis.    Patient reports the pain has now resolved > 24 hours but was located in epigastric area with radiation towards mid chest.    Pain was characterized as sharp in nature and was 8/10.    Patient though he was having another episode of pancreatitis.    Patient denies N/V/D, hematemesis, hematochezia, melena, urinary complaints or SOB.              PAST MEDICAL HISTORY:  High cholesterol    Elevated cholesterol with high triglycerides    Hx of Pancreatitis    Acid reflux    BP (high blood pressure)        PAST SURGICAL HISTORY:  Denies surgical history          Home Medications:  aspirin 81 mg oral delayed release capsule: orally (28 Aug 2023 22:27)  atorvastatin 20 mg oral tablet: 1 orally (28 Aug 2023 22:27)  gabapentin 100 mg oral capsule: 1 orally (28 Aug 2023 22:27)  metFORMIN 500 mg oral tablet: 1 orally (28 Aug 2023 22:27)  omeprazole 10 mg oral powder for reconstitution, delayed release: 1 orally (28 Aug 2023 22:27)  pioglitazone 30 mg oral tablet: 1 orally (28 Aug 2023 22:27)  Fish oil daily        Allergies  No Known Allergies        SOCIAL HISTORY:  Tobacco use:  Smokes 2-3 packs per day for over 50 years.  Unmotivated to quit.  Alcohol use:  Denies use  No illicit drug use        FAMILY HISTORY:  Mother: Skin cancer  Father DM        REVIEW OF SYSTEMS:  CONSTITUTIONAL:   + Fevers.   No chills or weakness.  EYES/ENT: No visual changes;  No vertigo or throat pain   NECK: No pain or stiffness  RESPIRATORY: No cough, wheezing, hemoptysis; No shortness of breath  CARDIOVASCULAR:  + Chest pain.  No palpitations  GASTROINTESTINAL:  + Epigastric pain.  No nausea, vomiting, or hematemesis; No diarrhea.  + Constipation.  No melena or hematochezia.  GENITOURINARY: No dysuria, frequency or hematuria  NEUROLOGICAL: No numbness or weakness  SKIN: No itching, burning, rashes, or lesions   All other review of systems is negative unless indicated above.          Vital Signs Last 24 Hrs  T(C): 38.1 (28 Aug 2023 16:43), Max: 38.4 (28 Aug 2023 09:50)  T(F): 100.5 (28 Aug 2023 16:43), Max: 101.2 (28 Aug 2023 09:50)  HR: 74 (28 Aug 2023 16:43) (67 - 74)  BP: 177/82 (28 Aug 2023 16:43) (119/63 - 177/82)  RR: 20 (28 Aug 2023 16:43) (20 - 22)  SpO2: 96% (28 Aug 2023 16:43) (95% - 96%)    Parameters below as of 28 Aug 2023 16:43  Patient On (Oxygen Delivery Method): room air        PHYSICAL EXAM:  General:  WD, WN, conversant in NAD, currently resting comfortably.  HEENT:  NC/AT, EOMI, normal hearing,  no LAD, neck supple.  Pulmonary:   CTA B/L, Normal respiratory effort.  Cardiovascular:  S1 & S2, NSR, no murmurs, rubs or gallops.  Abdominal:   + BS, soft,  No distention,  Non-tender.  No Rebound/guarding.  No Peritoneal signs.   Genitourinary:   No flank tenderness.  No suprapubic tenderness.    Extremities:  Normal strength, no clubbing/cyanosis/edema. Palpable distal pulses.   Neuro:  Awake, alert & oriented x 3, CNs II-XII grossly intact, normal sensation, no focal deficits.        LABS:                        15.2   10.42 )-----------( 187      ( 28 Aug 2023 10:30 )             45.3     08-28    134<L>  |  94<L>  |  19  ----------------------------<  124<H>  4.8   |  24  |  0.9    Ca    10.9<H>      28 Aug 2023 10:30    TPro  7.5  /  Alb  4.6  /  TBili  0.5  /  DBili  x   /  AST  26  /  ALT  23  /  AlkPhos  32  08-28        Urinalysis Basic - ( 28 Aug 2023 10:30 )    Color: x / Appearance: x / SG: x / pH: x  Gluc: 124 mg/dL / Ketone: x  / Bili: x / Urobili: x   Blood: x / Protein: x / Nitrite: x   Leuk Esterase: x / RBC: x / WBC x   Sq Epi: x / Non Sq Epi: x / Bacteria: x        Blood Cultures x 2 taken in ER.            US Abdomen Upper Quadrant Right (08.28.23 @ 17:09)   ACC: [id number] EXAM:  US ABDOMEN RT UPR QUADRANT   ORDERED BY: KENIANUHA LOYOLA     PROCEDURE DATE:  08/28/2023      INTERPRETATION:  CLINICAL INFORMATION: Gallstones    COMPARISON: CT dated 8/28/2023    TECHNIQUE: Sonography of the right upper quadrant.    FINDINGS:  Liver: Echogenic liver compatible with steatosis. Hypoechoic region along   the gallbladder likely representing focal fatty sparing.  Bile ducts: Normal caliber. Common bile duct measures 5 mm.  Gallbladder: Cholelithiasis and sludge. Negative sonographic Garcia's   sign.  Pancreas: Poorly visualized.  Right kidney: 13.4 cm. No hydronephrosis. 3.7 cm cyst.  Ascites: None.      IMPRESSION:    Cholelithiasis and sludge without sonographic evidence of acute   cholecystitis    Hepatic steatosis          CT Abdomen and Pelvis w/ IV Cont (08.28.23 @ 13:16)   ACC: [id number] EXAM:  CT ABDOMEN AND PELVIS IC   ORDERED BY: KENIANUHA LOYOLA     PROCEDURE DATE:  08/28/2023      INTERPRETATION:  Clinical Indication: Abdominal pain.    Technique: Multidetector-row CT images of the abdomen and pelvis were   obtained from the xiphoid through the symphysis pubis following the   administration of intravenous contrast. No oral contrast was   administered.  Coronal and sagittal reconstructions were performed.    Contrast: 95 cc Omnipaque 350 non-ionic intravenous contrast.    Comparison: CT abdomen pelvis 10/17/2013.    Findings:    01. LIVER: Normal morphology.  No focal lesion.  02. SPLEEN: Essentially stable splenomegaly measuring 14.6 cm  03. PANCREAS: Normal.  04. GALLBLADDER/BILIARY TREE: Cholelithiasis. Mildly thickened   gallbladder wall measuring 4 mm. No biliary dilation.  05. ADRENALS: Normal.  06. KIDNEYS: Symmetric enhancement.  No hydronephrosis, renal stone, or   soft tissue attenuation mass. Bilateral renal cysts.  07. LYMPHADENOPATHY/RETROPERITONEUM: No lymphadenopathy.  08. BOWEL: No bowel obstruction. Unremarkable appendix.  09. PELVIC VISCERA: Prostate measures 5.2 cm in the transverse dimension   (series 301 image 104). Under distended urinary bladder.  10. PELVIC LYMPH NODES: No lymphadenopathy.  11. VASCULATURE: Normal caliber abdominal aorta. Calcified   atherosclerotic disease of the aorta and its branches.  12. PERITONEUM/ABDOMINAL WALL: No ascites.  13. SKELETAL: Degenerative changes. Stable compression deformity of the   T12 vertebral body  14. LUNG BASES: Small bilateral atelectasis.        IMPRESSION:    Cholelithiasis with mild gallbladder wall thickening. Findings are   indeterminate. Recommend abdominal ultrasound for further evaluation.    Essentially stable splenomegaly.               .  Patient is a 73 y/o male with medical history of Type 2 diabetes, High cholesterol, GERD, Hx of Pancreatitis, Everyday heavy smoker (2-3 PPD), and peripheral neuropathy who presents with his wife to the ER with c/o now resolved chest pain, epigastric pain and fever to 102 at home and constipation x 4 days.   Patient reports the chest pain and epigastric pain started 3-4 days ago and now has been resolved for over 24 hours but he developed a fever to 102 at home so his wife brought him to the ER for evaluation.    Patient reports a history of Pancreatitis x 2 but was told they could not find a cause.  Patient denies being told he had gallstones in the past,  patient denies alcohol use.  He reports Meclizine may had caused one episode of pancreatitis.    Patient reports the pain has now resolved > 24 hours but was located in epigastric area with radiation towards mid chest.    Pain was characterized as sharp in nature and was 8/10.    Patient thought he was having another episode of pancreatitis.    Patient denies N/V/D, hematemesis, hematochezia, melena, urinary complaints or SOB.              PAST MEDICAL HISTORY:  High cholesterol    Elevated cholesterol with high triglycerides    Hx of Pancreatitis    Acid reflux    BP (high blood pressure)        PAST SURGICAL HISTORY:  Denies surgical history          Home Medications:  aspirin 81 mg oral delayed release capsule: orally (28 Aug 2023 22:27)  atorvastatin 20 mg oral tablet: 1 orally (28 Aug 2023 22:27)  gabapentin 100 mg oral capsule: 1 orally (28 Aug 2023 22:27)  metFORMIN 500 mg oral tablet: 1 orally (28 Aug 2023 22:27)  omeprazole 10 mg oral powder for reconstitution, delayed release: 1 orally (28 Aug 2023 22:27)  pioglitazone 30 mg oral tablet: 1 orally (28 Aug 2023 22:27)  Fish oil daily        Allergies  No Known Allergies        SOCIAL HISTORY:  Tobacco use:  Smokes 2-3 packs per day for over 50 years.  Unmotivated to quit.  Alcohol use:  Denies use  No illicit drug use        FAMILY HISTORY:  Mother: Skin cancer  Father DM        REVIEW OF SYSTEMS:  CONSTITUTIONAL:   + Fevers.   No chills or weakness.  EYES/ENT: No visual changes;  No vertigo or throat pain   NECK: No pain or stiffness  RESPIRATORY: No cough, wheezing, hemoptysis; No shortness of breath  CARDIOVASCULAR:  + Chest pain.  No palpitations  GASTROINTESTINAL:  + Epigastric pain.  No nausea, vomiting, or hematemesis; No diarrhea.  + Constipation.  No melena or hematochezia.  GENITOURINARY: No dysuria, frequency or hematuria  NEUROLOGICAL: No numbness or weakness  SKIN: No itching, burning, rashes, or lesions   All other review of systems is negative unless indicated above.          Vital Signs Last 24 Hrs  T(C): 38.1 (28 Aug 2023 16:43), Max: 38.4 (28 Aug 2023 09:50)  T(F): 100.5 (28 Aug 2023 16:43), Max: 101.2 (28 Aug 2023 09:50)  HR: 74 (28 Aug 2023 16:43) (67 - 74)  BP: 177/82 (28 Aug 2023 16:43) (119/63 - 177/82)  RR: 20 (28 Aug 2023 16:43) (20 - 22)  SpO2: 96% (28 Aug 2023 16:43) (95% - 96%)    Parameters below as of 28 Aug 2023 16:43  Patient On (Oxygen Delivery Method): room air        PHYSICAL EXAM:  General:  WD, WN, conversant in NAD, currently resting comfortably.  HEENT:  NC/AT, EOMI, normal hearing,  no LAD, neck supple.  Pulmonary:   CTA B/L, Normal respiratory effort.  Cardiovascular:  S1 & S2, NSR, no murmurs, rubs or gallops.  Abdominal:   + BS, soft,  No distention,  Non-tender.  No Rebound/guarding.  No Peritoneal signs.   Genitourinary:   No flank tenderness.  No suprapubic tenderness.    Extremities:  Normal strength, no clubbing/cyanosis/edema. Palpable distal pulses.   Neuro:  Awake, alert & oriented x 3, CNs II-XII grossly intact, normal sensation, no focal deficits.        LABS:                        15.2   10.42 )-----------( 187      ( 28 Aug 2023 10:30 )             45.3     08-28    134<L>  |  94<L>  |  19  ----------------------------<  124<H>  4.8   |  24  |  0.9    Ca    10.9<H>      28 Aug 2023 10:30    TPro  7.5  /  Alb  4.6  /  TBili  0.5  /  DBili  x   /  AST  26  /  ALT  23  /  AlkPhos  32  08-28        Urinalysis Basic - ( 28 Aug 2023 10:30 )    Color: x / Appearance: x / SG: x / pH: x  Gluc: 124 mg/dL / Ketone: x  / Bili: x / Urobili: x   Blood: x / Protein: x / Nitrite: x   Leuk Esterase: x / RBC: x / WBC x   Sq Epi: x / Non Sq Epi: x / Bacteria: x        Blood Cultures x 2 taken in ER.            US Abdomen Upper Quadrant Right (08.28.23 @ 17:09)   ACC: [id number] EXAM:  US ABDOMEN RT UPR QUADRANT   ORDERED BY: KENIANUHA LOYOLA     PROCEDURE DATE:  08/28/2023      INTERPRETATION:  CLINICAL INFORMATION: Gallstones    COMPARISON: CT dated 8/28/2023    TECHNIQUE: Sonography of the right upper quadrant.    FINDINGS:  Liver: Echogenic liver compatible with steatosis. Hypoechoic region along   the gallbladder likely representing focal fatty sparing.  Bile ducts: Normal caliber. Common bile duct measures 5 mm.  Gallbladder: Cholelithiasis and sludge. Negative sonographic Garcia's   sign.  Pancreas: Poorly visualized.  Right kidney: 13.4 cm. No hydronephrosis. 3.7 cm cyst.  Ascites: None.      IMPRESSION:    Cholelithiasis and sludge without sonographic evidence of acute   cholecystitis    Hepatic steatosis          CT Abdomen and Pelvis w/ IV Cont (08.28.23 @ 13:16)   ACC: [id number] EXAM:  CT ABDOMEN AND PELVIS IC   ORDERED BY: KENIANUHA LOYOLA     PROCEDURE DATE:  08/28/2023      INTERPRETATION:  Clinical Indication: Abdominal pain.    Technique: Multidetector-row CT images of the abdomen and pelvis were   obtained from the xiphoid through the symphysis pubis following the   administration of intravenous contrast. No oral contrast was   administered.  Coronal and sagittal reconstructions were performed.    Contrast: 95 cc Omnipaque 350 non-ionic intravenous contrast.    Comparison: CT abdomen pelvis 10/17/2013.    Findings:    01. LIVER: Normal morphology.  No focal lesion.  02. SPLEEN: Essentially stable splenomegaly measuring 14.6 cm  03. PANCREAS: Normal.  04. GALLBLADDER/BILIARY TREE: Cholelithiasis. Mildly thickened   gallbladder wall measuring 4 mm. No biliary dilation.  05. ADRENALS: Normal.  06. KIDNEYS: Symmetric enhancement.  No hydronephrosis, renal stone, or   soft tissue attenuation mass. Bilateral renal cysts.  07. LYMPHADENOPATHY/RETROPERITONEUM: No lymphadenopathy.  08. BOWEL: No bowel obstruction. Unremarkable appendix.  09. PELVIC VISCERA: Prostate measures 5.2 cm in the transverse dimension   (series 301 image 104). Under distended urinary bladder.  10. PELVIC LYMPH NODES: No lymphadenopathy.  11. VASCULATURE: Normal caliber abdominal aorta. Calcified   atherosclerotic disease of the aorta and its branches.  12. PERITONEUM/ABDOMINAL WALL: No ascites.  13. SKELETAL: Degenerative changes. Stable compression deformity of the   T12 vertebral body  14. LUNG BASES: Small bilateral atelectasis.        IMPRESSION:    Cholelithiasis with mild gallbladder wall thickening. Findings are   indeterminate. Recommend abdominal ultrasound for further evaluation.    Essentially stable splenomegaly.

## 2023-08-28 NOTE — ED PROVIDER NOTE - PROGRESS NOTE DETAILS
Agnieszka - S/o Dr. Nettie ALEXANDER f/u stefany lockwood, consider surgery consult and re-eval gallstones and gallbladder wall thickening, ruq sono and iv abx added on Pt seen and evaluated by Surgery PA and d/w Dr. Sheryl carter medical admission and HIDA and medical work up

## 2023-08-28 NOTE — ED PROVIDER NOTE - CARE PLAN
1 Principal Discharge DX:	Abdominal pain  Secondary Diagnosis:	Fever  Secondary Diagnosis:	Cholelithiasis  Secondary Diagnosis:	Chest pain

## 2023-08-29 DIAGNOSIS — K81.1 CHRONIC CHOLECYSTITIS: ICD-10-CM

## 2023-08-29 LAB
A1C WITH ESTIMATED AVERAGE GLUCOSE RESULT: 5.9 % — HIGH (ref 4–5.6)
ALBUMIN SERPL ELPH-MCNC: 3.9 G/DL — SIGNIFICANT CHANGE UP (ref 3.5–5.2)
ALP SERPL-CCNC: 27 U/L — LOW (ref 30–115)
ALT FLD-CCNC: 28 U/L — SIGNIFICANT CHANGE UP (ref 0–41)
ANION GAP SERPL CALC-SCNC: 10 MMOL/L — SIGNIFICANT CHANGE UP (ref 7–14)
AST SERPL-CCNC: 27 U/L — SIGNIFICANT CHANGE UP (ref 0–41)
BILIRUB SERPL-MCNC: 0.5 MG/DL — SIGNIFICANT CHANGE UP (ref 0.2–1.2)
BUN SERPL-MCNC: 14 MG/DL — SIGNIFICANT CHANGE UP (ref 10–20)
CALCIUM SERPL-MCNC: 9.7 MG/DL — SIGNIFICANT CHANGE UP (ref 8.4–10.5)
CHLORIDE SERPL-SCNC: 98 MMOL/L — SIGNIFICANT CHANGE UP (ref 98–110)
CO2 SERPL-SCNC: 26 MMOL/L — SIGNIFICANT CHANGE UP (ref 17–32)
CREAT SERPL-MCNC: 0.8 MG/DL — SIGNIFICANT CHANGE UP (ref 0.7–1.5)
CULTURE RESULTS: SIGNIFICANT CHANGE UP
EGFR: 93 ML/MIN/1.73M2 — SIGNIFICANT CHANGE UP
ESTIMATED AVERAGE GLUCOSE: 123 MG/DL — HIGH (ref 68–114)
GLUCOSE BLDC GLUCOMTR-MCNC: 94 MG/DL — SIGNIFICANT CHANGE UP (ref 70–99)
GLUCOSE BLDC GLUCOMTR-MCNC: 96 MG/DL — SIGNIFICANT CHANGE UP (ref 70–99)
GLUCOSE SERPL-MCNC: 92 MG/DL — SIGNIFICANT CHANGE UP (ref 70–99)
HCT VFR BLD CALC: 39 % — LOW (ref 42–52)
HCV AB S/CO SERPL IA: 0.04 COI — SIGNIFICANT CHANGE UP
HCV AB SERPL-IMP: SIGNIFICANT CHANGE UP
HGB BLD-MCNC: 13.1 G/DL — LOW (ref 14–18)
LACTATE SERPL-SCNC: 1.2 MMOL/L — SIGNIFICANT CHANGE UP (ref 0.7–2)
LIDOCAIN IGE QN: 19 U/L — SIGNIFICANT CHANGE UP (ref 7–60)
MAGNESIUM SERPL-MCNC: 1.4 MG/DL — LOW (ref 1.8–2.4)
MCHC RBC-ENTMCNC: 29 PG — SIGNIFICANT CHANGE UP (ref 27–31)
MCHC RBC-ENTMCNC: 33.6 G/DL — SIGNIFICANT CHANGE UP (ref 32–37)
MCV RBC AUTO: 86.5 FL — SIGNIFICANT CHANGE UP (ref 80–94)
NRBC # BLD: 0 /100 WBCS — SIGNIFICANT CHANGE UP (ref 0–0)
PHOSPHATE SERPL-MCNC: 2.2 MG/DL — SIGNIFICANT CHANGE UP (ref 2.1–4.9)
PLATELET # BLD AUTO: 187 K/UL — SIGNIFICANT CHANGE UP (ref 130–400)
PMV BLD: 11.6 FL — HIGH (ref 7.4–10.4)
POTASSIUM SERPL-MCNC: 4.1 MMOL/L — SIGNIFICANT CHANGE UP (ref 3.5–5)
POTASSIUM SERPL-SCNC: 4.1 MMOL/L — SIGNIFICANT CHANGE UP (ref 3.5–5)
PROT SERPL-MCNC: 6.5 G/DL — SIGNIFICANT CHANGE UP (ref 6–8)
RBC # BLD: 4.51 M/UL — LOW (ref 4.7–6.1)
RBC # FLD: 12.7 % — SIGNIFICANT CHANGE UP (ref 11.5–14.5)
SODIUM SERPL-SCNC: 134 MMOL/L — LOW (ref 135–146)
SPECIMEN SOURCE: SIGNIFICANT CHANGE UP
TROPONIN T SERPL-MCNC: <0.01 NG/ML — SIGNIFICANT CHANGE UP
WBC # BLD: 11.06 K/UL — HIGH (ref 4.8–10.8)
WBC # FLD AUTO: 11.06 K/UL — HIGH (ref 4.8–10.8)

## 2023-08-29 PROCEDURE — 99223 1ST HOSP IP/OBS HIGH 75: CPT

## 2023-08-29 PROCEDURE — 99233 SBSQ HOSP IP/OBS HIGH 50: CPT

## 2023-08-29 PROCEDURE — 78226 HEPATOBILIARY SYSTEM IMAGING: CPT | Mod: 26

## 2023-08-29 PROCEDURE — 93010 ELECTROCARDIOGRAM REPORT: CPT

## 2023-08-29 RX ORDER — SODIUM CHLORIDE 9 MG/ML
1000 INJECTION INTRAMUSCULAR; INTRAVENOUS; SUBCUTANEOUS
Refills: 0 | Status: DISCONTINUED | OUTPATIENT
Start: 2023-08-29 | End: 2023-08-31

## 2023-08-29 RX ORDER — HEPARIN SODIUM 5000 [USP'U]/ML
5000 INJECTION INTRAVENOUS; SUBCUTANEOUS EVERY 12 HOURS
Refills: 0 | Status: DISCONTINUED | OUTPATIENT
Start: 2023-08-29 | End: 2023-08-30

## 2023-08-29 RX ORDER — MAGNESIUM SULFATE 500 MG/ML
2 VIAL (ML) INJECTION ONCE
Refills: 0 | Status: COMPLETED | OUTPATIENT
Start: 2023-08-29 | End: 2023-08-29

## 2023-08-29 RX ADMIN — ATORVASTATIN CALCIUM 20 MILLIGRAM(S): 80 TABLET, FILM COATED ORAL at 22:42

## 2023-08-29 RX ADMIN — Medication 650 MILLIGRAM(S): at 09:49

## 2023-08-29 RX ADMIN — PIPERACILLIN AND TAZOBACTAM 200 GRAM(S): 4; .5 INJECTION, POWDER, LYOPHILIZED, FOR SOLUTION INTRAVENOUS at 00:08

## 2023-08-29 RX ADMIN — HEPARIN SODIUM 5000 UNIT(S): 5000 INJECTION INTRAVENOUS; SUBCUTANEOUS at 05:50

## 2023-08-29 RX ADMIN — Medication 25 GRAM(S): at 17:06

## 2023-08-29 RX ADMIN — PIPERACILLIN AND TAZOBACTAM 25 GRAM(S): 4; .5 INJECTION, POWDER, LYOPHILIZED, FOR SOLUTION INTRAVENOUS at 17:15

## 2023-08-29 RX ADMIN — HEPARIN SODIUM 5000 UNIT(S): 5000 INJECTION INTRAVENOUS; SUBCUTANEOUS at 18:10

## 2023-08-29 RX ADMIN — PIPERACILLIN AND TAZOBACTAM 25 GRAM(S): 4; .5 INJECTION, POWDER, LYOPHILIZED, FOR SOLUTION INTRAVENOUS at 22:42

## 2023-08-29 RX ADMIN — PANTOPRAZOLE SODIUM 40 MILLIGRAM(S): 20 TABLET, DELAYED RELEASE ORAL at 05:50

## 2023-08-29 RX ADMIN — SODIUM CHLORIDE 75 MILLILITER(S): 9 INJECTION INTRAMUSCULAR; INTRAVENOUS; SUBCUTANEOUS at 00:30

## 2023-08-29 NOTE — CONSULT NOTE ADULT - SUBJECTIVE AND OBJECTIVE BOX
Chief complaint/Reason for consult:   abdominal pain, fever  HPI:  Patient is 73 y/o male with past medical  history of Type 2 diabetes, High cholesterol, GERD, Hx of Pancreatitis, Everyday heavy smoker (2-3 PPD), and peripheral neuropathy who presents with his wife to the ER with c/o now resolved chest pain, epigastric pain and fever to 102 at home and constipation x 4 days.   Patient reports the chest pain and epigastric pain started 3-4 days ago and now has been resolved for over 24 hours but he developed a fever to 102 at home so his wife brought him to the ER for evaluation.    Patient reports a history of Pancreatitis x 2 but was told they could not find a cause.  Patient denies being told he had gallstones in the past,  patient denies alcohol use.  He reports Meclizine may had caused one episode of pancreatitis.    Patient reports the pain has now resolved > 24 hours but was located in epigastric area with radiation towards mid chest.    Pain was characterized as sharp in nature and was 8/10.        Patient denies N/V/D, hematemesis, hematochezia, melena, urinary complaints or SO (28 Aug 2023 22:29)    GI Updates: 73 y/o male with past medical  history of Type 2 diabetes, High cholesterol, GERD, Hx of Pancreatitis, Everyday heavy smoker (2-3 PPD), presents for fever and RUQ pain and chest pain on and off for months, patient reports recurrent pancreatitis episodes with no etiology. Currently Patient denies nausea, vomiting, hematemesis, melena, blood in stool, diarrhea, constipation, abdominal pain.      PAST MEDICAL & SURGICAL HISTORY:  High cholesterol      Elevated cholesterol with high triglycerides      Pancreatitis      Acid reflux      BP (high blood pressure)      No significant past surgical history            Family history:  FAMILY HISTORY:  No pertinent family history in first degree relatives      No GI cancers in first or second degree relatives    Social History: +cigarette smoking. No alcohol. No illegal drug use.    Allergies:   No Known Allergies  Intolerances      MEDICATIONS: Home Medications:  aspirin 81 mg oral delayed release capsule: orally (28 Aug 2023 22:27)  atorvastatin 20 mg oral tablet: 1 orally (28 Aug 2023 22:27)  gabapentin 100 mg oral capsule: 1 orally (28 Aug 2023 22:27)  meclizine 12.5 mg oral tablet: 1 orally (28 Aug 2023 22:27)  metFORMIN 500 mg oral tablet: 1 orally (28 Aug 2023 22:27)  omeprazole 10 mg oral powder for reconstitution, delayed release: 1 orally (28 Aug 2023 22:27)  pioglitazone 30 mg oral tablet: 1 orally (28 Aug 2023 22:27)    MEDICATIONS  (STANDING):  aspirin enteric coated 81 milliGRAM(s) Oral daily  atorvastatin 20 milliGRAM(s) Oral at bedtime  dextrose 5%. 1000 milliLiter(s) (50 mL/Hr) IV Continuous <Continuous>  dextrose 5%. 1000 milliLiter(s) (100 mL/Hr) IV Continuous <Continuous>  dextrose 50% Injectable 25 Gram(s) IV Push once  dextrose 50% Injectable 12.5 Gram(s) IV Push once  dextrose 50% Injectable 25 Gram(s) IV Push once  gabapentin 100 milliGRAM(s) Oral daily  glucagon  Injectable 1 milliGRAM(s) IntraMuscular once  heparin   Injectable 5000 Unit(s) SubCutaneous every 12 hours  insulin lispro (ADMELOG) corrective regimen sliding scale   SubCutaneous three times a day before meals  magnesium sulfate  IVPB 2 Gram(s) IV Intermittent once  pantoprazole    Tablet 40 milliGRAM(s) Oral before breakfast  piperacillin/tazobactam IVPB.. 3.375 Gram(s) IV Intermittent every 8 hours  sodium chloride 0.9%. 1000 milliLiter(s) (75 mL/Hr) IV Continuous <Continuous>    MEDICATIONS  (PRN):  acetaminophen     Tablet .. 650 milliGRAM(s) Oral every 6 hours PRN Temp greater or equal to 38C (100.4F), Mild Pain (1 - 3)  aluminum hydroxide/magnesium hydroxide/simethicone Suspension 30 milliLiter(s) Oral every 4 hours PRN Dyspepsia  dextrose Oral Gel 15 Gram(s) Oral once PRN Blood Glucose LESS THAN 70 milliGRAM(s)/deciliter  melatonin 5 milliGRAM(s) Oral at bedtime PRN Insomnia  ondansetron Injectable 4 milliGRAM(s) IV Push every 8 hours PRN Nausea and/or Vomiting        REVIEW OF SYSTEMS  General:  No weight loss, fevers, or chills.  Eyes:  No reported pain or visual changes  ENT:  No sore throat or runny nose.  NECK: No stiffness or lymphadenopathy  CV:  No chest pain or palpitations.  Resp:  No shortness of breath, cough, wheezing or hemoptysis  GI:  No abdominal pain, nausea, vomiting, dysphagia, diarrhea or constipation. No rectal bleeding, melena, or hematemesis.  Muscle:  No aches or weakness  Neuro:  No tingling, numbness       VITALS:   T(F): 96.8 (08-29-23 @ 15:47), Max: 100.5 (08-28-23 @ 16:43)  HR: 83 (08-29-23 @ 15:47) (68 - 83)  BP: 183/83 (08-29-23 @ 15:47) (167/75 - 183/83)  RR: 18 (08-29-23 @ 15:47) (18 - 20)  SpO2: 97% (08-29-23 @ 15:47) (95% - 97%)    PHYSICAL EXAM:  GENERAL: AAOx3, no acute distress.  HEAD:  Atraumatic, Normocephalic  EYES: conjunctiva and sclera clear  NECK: Supple, No thyromegaly   CHEST/LUNG: Clear to auscultation bilaterally; No wheeze, rhonchi, or rales  HEART: Regular rate and rhythm; normal S1, S2, No murmurs.  ABDOMEN: Soft, nontender, nondistended; Bowel sounds present  NEUROLOGY: No asterixis or tremor  SKIN: Intact, no jaundice          LABS:  08-29    134<L>  |  98  |  14  ----------------------------<  92  4.1   |  26  |  0.8    Ca    9.7      29 Aug 2023 06:31  Phos  2.2     08-29  Mg     1.4     08-29    TPro  6.5  /  Alb  3.9  /  TBili  0.5  /  DBili  x   /  AST  27  /  ALT  28  /  AlkPhos  27<L>  08-29                          13.1   11.06 )-----------( 187      ( 29 Aug 2023 06:31 )             39.0     LIVER FUNCTIONS - ( 29 Aug 2023 06:31 )  Alb: 3.9 g/dL / Pro: 6.5 g/dL / ALK PHOS: 27 U/L / ALT: 28 U/L / AST: 27 U/L / GGT: x               IMAGING:    < from: US Abdomen Upper Quadrant Right (08.28.23 @ 17:09) >    ACC: [id number] EXAM:  US ABDOMEN RT UPR QUADRANT   ORDERED BY: KENIA LOYOLA     PROCEDURE DATE:  08/28/2023          INTERPRETATION:  CLINICAL INFORMATION: Gallstones    COMPARISON: CT dated 8/28/2023    TECHNIQUE: Sonography of the right upper quadrant.    FINDINGS:  Liver: Echogenic liver compatible with steatosis. Hypoechoic region along   the gallbladder likely representing focal fatty sparing.  Bile ducts: Normal caliber. Common bile duct measures 5 mm.  Gallbladder: Cholelithiasis and sludge. Negative sonographic Garcia's   sign.  Pancreas: Poorly visualized.  Right kidney: 13.4 cm. No hydronephrosis. 3.7 cm cyst.  Ascites: None.      IMPRESSION:    Cholelithiasis and sludge without sonographic evidence of acute   cholecystitis    Hepatic steatosis    --- End of Report ---            ANGELINA AVALOS MD; Attending Radiologist  This document has been electronically signed. Aug 28 2023  7:00PM    < end of copied text >    < from: CT Abdomen and Pelvis w/ IV Cont (08.28.23 @ 13:16) >    ACC: [id number] EXAM:  CT ABDOMEN AND PELVIS IC   ORDERED BY: KENIA LOYOLA     PROCEDURE DATE:  08/28/2023          INTERPRETATION:  Clinical Indication: Abdominal pain.    Technique: Multidetector-row CT images of the abdomen and pelvis were   obtained from the xiphoid through the symphysis pubis following the   administration of intravenous contrast. No oral contrast was   administered.  Coronal and sagittal reconstructions were performed.    Contrast: 95 cc Omnipaque 350 non-ionic intravenous contrast.    Comparison: CT abdomen pelvis 10/17/2013.    Findings:    01. LIVER: Normal morphology.  No focal lesion.  02. SPLEEN: Essentially stable splenomegaly measuring 14.6 cm  03. PANCREAS: Normal.  04. GALLBLADDER/BILIARY TREE: Cholelithiasis.Mildly thickened   gallbladder wall measuring 4 mm. No biliary dilation.  05. ADRENALS: Normal.  06. KIDNEYS: Symmetric enhancement.  No hydronephrosis, renal stone, or   soft tissue attenuation mass. Bilateral renal cysts.  07. LYMPHADENOPATHY/RETROPERITONEUM: No lymphadenopathy.  08. BOWEL: No bowel obstruction. Unremarkable appendix.  09. PELVIC VISCERA: Prostate measures 5.2 cm in the transverse dimension   (series 301 image 104). Under distended urinary bladder.  10. PELVIC LYMPH NODES: No lymphadenopathy.  11. VASCULATURE: Normal caliber abdominal aorta. Calcified   atherosclerotic disease of the aorta and its branches.  12. PERITONEUM/ABDOMINAL WALL: No ascites.  13. SKELETAL: Degenerative changes. Stable compression deformity of the   T12 vertebral body  14. LUNG BASES: Small bilateral atelectasis.    IMPRESSION:    Cholelithiasis with mild gallbladder wall thickening. Findings are   indeterminate. Recommend abdominal ultrasound for further evaluation.    Essentially stable splenomegaly.      --- End of Report ---            CHELSEA HOLLOWAY MD; Attending Radiologist  This document has been electronically signed. Aug 28 2023  4:02PM    < end of copied text >       Chief complaint/Reason for consult: abdominal pain, fever  HPI:  Patient is 73 y/o male with past medical  history of Type 2 diabetes, High cholesterol, GERD, Hx of Pancreatitis, Everyday heavy smoker (2-3 PPD), and peripheral neuropathy who presents with his wife to the ER with c/o now resolved chest pain, epigastric pain and fever to 102 at home and constipation x 4 days.   Patient reports the chest pain and epigastric pain started 3-4 days ago and now has been resolved for over 24 hours but he developed a fever to 102 at home so his wife brought him to the ER for evaluation.    Patient reports a history of Pancreatitis x 2 but was told they could not find a cause.  Patient denies being told he had gallstones in the past,  patient denies alcohol use.  He reports Meclizine may had caused one episode of pancreatitis.    Patient reports the pain has now resolved > 24 hours but was located in epigastric area with radiation towards mid chest.    Pain was characterized as sharp in nature and was 8/10.        Patient denies N/V/D, hematemesis, hematochezia, melena, urinary complaints or SO (28 Aug 2023 22:29)    GI Updates: 73 y/o male with past medical  history of Type 2 diabetes, High cholesterol, GERD, Hx of Pancreatitis, Everyday heavy smoker (2-3 PPD), presents for fever and RUQ pain and chest pain on and off for months, patient reports recurrent pancreatitis episodes with no etiology. Currently Patient denies nausea, vomiting, hematemesis, melena, blood in stool, diarrhea, +constipation no abdominal pain.      PAST MEDICAL & SURGICAL HISTORY:  High cholesterol      Elevated cholesterol with high triglycerides      Pancreatitis      Acid reflux      BP (high blood pressure)      No significant past surgical history            Family history:  FAMILY HISTORY:  No pertinent family history in first degree relatives      No GI cancers in first or second degree relatives    Social History: +cigarette smoking. No alcohol. No illegal drug use.    Allergies:   No Known Allergies  Intolerances      MEDICATIONS: Home Medications:  aspirin 81 mg oral delayed release capsule: orally (28 Aug 2023 22:27)  atorvastatin 20 mg oral tablet: 1 orally (28 Aug 2023 22:27)  gabapentin 100 mg oral capsule: 1 orally (28 Aug 2023 22:27)  meclizine 12.5 mg oral tablet: 1 orally (28 Aug 2023 22:27)  metFORMIN 500 mg oral tablet: 1 orally (28 Aug 2023 22:27)  omeprazole 10 mg oral powder for reconstitution, delayed release: 1 orally (28 Aug 2023 22:27)  pioglitazone 30 mg oral tablet: 1 orally (28 Aug 2023 22:27)    MEDICATIONS  (STANDING):  aspirin enteric coated 81 milliGRAM(s) Oral daily  atorvastatin 20 milliGRAM(s) Oral at bedtime  dextrose 5%. 1000 milliLiter(s) (50 mL/Hr) IV Continuous <Continuous>  dextrose 5%. 1000 milliLiter(s) (100 mL/Hr) IV Continuous <Continuous>  dextrose 50% Injectable 25 Gram(s) IV Push once  dextrose 50% Injectable 12.5 Gram(s) IV Push once  dextrose 50% Injectable 25 Gram(s) IV Push once  gabapentin 100 milliGRAM(s) Oral daily  glucagon  Injectable 1 milliGRAM(s) IntraMuscular once  heparin   Injectable 5000 Unit(s) SubCutaneous every 12 hours  insulin lispro (ADMELOG) corrective regimen sliding scale   SubCutaneous three times a day before meals  magnesium sulfate  IVPB 2 Gram(s) IV Intermittent once  pantoprazole    Tablet 40 milliGRAM(s) Oral before breakfast  piperacillin/tazobactam IVPB.. 3.375 Gram(s) IV Intermittent every 8 hours  sodium chloride 0.9%. 1000 milliLiter(s) (75 mL/Hr) IV Continuous <Continuous>    MEDICATIONS  (PRN):  acetaminophen     Tablet .. 650 milliGRAM(s) Oral every 6 hours PRN Temp greater or equal to 38C (100.4F), Mild Pain (1 - 3)  aluminum hydroxide/magnesium hydroxide/simethicone Suspension 30 milliLiter(s) Oral every 4 hours PRN Dyspepsia  dextrose Oral Gel 15 Gram(s) Oral once PRN Blood Glucose LESS THAN 70 milliGRAM(s)/deciliter  melatonin 5 milliGRAM(s) Oral at bedtime PRN Insomnia  ondansetron Injectable 4 milliGRAM(s) IV Push every 8 hours PRN Nausea and/or Vomiting        REVIEW OF SYSTEMS  General:  No weight loss, fevers, or chills.  Eyes:  No reported pain or visual changes  ENT:  No sore throat or runny nose.  NECK: No stiffness or lymphadenopathy  CV:  No chest pain or palpitations.  Resp:  No shortness of breath, cough, wheezing or hemoptysis  GI:  No abdominal pain, nausea, vomiting, dysphagia, diarrhea + constipation. No rectal bleeding, melena, or hematemesis.  Muscle:  No aches or weakness  Neuro:  No tingling, numbness       VITALS:   T(F): 96.8 (08-29-23 @ 15:47), Max: 100.5 (08-28-23 @ 16:43)  HR: 83 (08-29-23 @ 15:47) (68 - 83)  BP: 183/83 (08-29-23 @ 15:47) (167/75 - 183/83)  RR: 18 (08-29-23 @ 15:47) (18 - 20)  SpO2: 97% (08-29-23 @ 15:47) (95% - 97%)    PHYSICAL EXAM:  GENERAL: AAOx3, no acute distress.  HEAD:  Atraumatic, Normocephalic  EYES: conjunctiva and sclera clear  NECK: Supple, No thyromegaly   CHEST/LUNG: Clear to auscultation bilaterally; No wheeze, rhonchi, or rales  HEART: Regular rate and rhythm; normal S1, S2, No murmurs.  ABDOMEN: Soft, nontender, nondistended; Bowel sounds present  NEUROLOGY: No asterixis or tremor  SKIN: Intact, no jaundice          LABS:  08-29    134<L>  |  98  |  14  ----------------------------<  92  4.1   |  26  |  0.8    Ca    9.7      29 Aug 2023 06:31  Phos  2.2     08-29  Mg     1.4     08-29    TPro  6.5  /  Alb  3.9  /  TBili  0.5  /  DBili  x   /  AST  27  /  ALT  28  /  AlkPhos  27<L>  08-29                          13.1   11.06 )-----------( 187      ( 29 Aug 2023 06:31 )             39.0     LIVER FUNCTIONS - ( 29 Aug 2023 06:31 )  Alb: 3.9 g/dL / Pro: 6.5 g/dL / ALK PHOS: 27 U/L / ALT: 28 U/L / AST: 27 U/L / GGT: x               IMAGING:    < from: US Abdomen Upper Quadrant Right (08.28.23 @ 17:09) >    ACC: [id number] EXAM:  US ABDOMEN RT UPR QUADRANT   ORDERED BY: KENIA LOYOLA     PROCEDURE DATE:  08/28/2023          INTERPRETATION:  CLINICAL INFORMATION: Gallstones    COMPARISON: CT dated 8/28/2023    TECHNIQUE: Sonography of the right upper quadrant.    FINDINGS:  Liver: Echogenic liver compatible with steatosis. Hypoechoic region along   the gallbladder likely representing focal fatty sparing.  Bile ducts: Normal caliber. Common bile duct measures 5 mm.  Gallbladder: Cholelithiasis and sludge. Negative sonographic Garcia's   sign.  Pancreas: Poorly visualized.  Right kidney: 13.4 cm. No hydronephrosis. 3.7 cm cyst.  Ascites: None.      IMPRESSION:    Cholelithiasis and sludge without sonographic evidence of acute   cholecystitis    Hepatic steatosis    --- End of Report ---            ANGELINA AVALOS MD; Attending Radiologist  This document has been electronically signed. Aug 28 2023  7:00PM    < end of copied text >    < from: CT Abdomen and Pelvis w/ IV Cont (08.28.23 @ 13:16) >    ACC: [id number] EXAM:  CT ABDOMEN AND PELVIS IC   ORDERED BY: KENIA LOYOLA     PROCEDURE DATE:  08/28/2023          INTERPRETATION:  Clinical Indication: Abdominal pain.    Technique: Multidetector-row CT images of the abdomen and pelvis were   obtained from the xiphoid through the symphysis pubis following the   administration of intravenous contrast. No oral contrast was   administered.  Coronal and sagittal reconstructions were performed.    Contrast: 95 cc Omnipaque 350 non-ionic intravenous contrast.    Comparison: CT abdomen pelvis 10/17/2013.    Findings:    01. LIVER: Normal morphology.  No focal lesion.  02. SPLEEN: Essentially stable splenomegaly measuring 14.6 cm  03. PANCREAS: Normal.  04. GALLBLADDER/BILIARY TREE: Cholelithiasis.Mildly thickened   gallbladder wall measuring 4 mm. No biliary dilation.  05. ADRENALS: Normal.  06. KIDNEYS: Symmetric enhancement.  No hydronephrosis, renal stone, or   soft tissue attenuation mass. Bilateral renal cysts.  07. LYMPHADENOPATHY/RETROPERITONEUM: No lymphadenopathy.  08. BOWEL: No bowel obstruction. Unremarkable appendix.  09. PELVIC VISCERA: Prostate measures 5.2 cm in the transverse dimension   (series 301 image 104). Under distended urinary bladder.  10. PELVIC LYMPH NODES: No lymphadenopathy.  11. VASCULATURE: Normal caliber abdominal aorta. Calcified   atherosclerotic disease of the aorta and its branches.  12. PERITONEUM/ABDOMINAL WALL: No ascites.  13. SKELETAL: Degenerative changes. Stable compression deformity of the   T12 vertebral body  14. LUNG BASES: Small bilateral atelectasis.    IMPRESSION:    Cholelithiasis with mild gallbladder wall thickening. Findings are   indeterminate. Recommend abdominal ultrasound for further evaluation.    Essentially stable splenomegaly.      --- End of Report ---            CHELSEA HOLLOWAY MD; Attending Radiologist  This document has been electronically signed. Aug 28 2023  4:02PM    < end of copied text >

## 2023-08-29 NOTE — PROGRESS NOTE ADULT - SUBJECTIVE AND OBJECTIVE BOX
Progress note    INTERVAL HPI/OVERNIGHT EVENTS:    Patient seen and examined at bedside. He admits to some abd pain in the RUQ. No subjective fevers or chills noted.      REVIEW OF SYSTEMS:  All other 13 Review of systems were reviewed and are negative    FAMILY HISTORY:  No pertinent family history in first degree relatives      T(C): 36 (08-29-23 @ 15:47), Max: 38.1 (08-28-23 @ 16:43)  HR: 83 (08-29-23 @ 15:47) (68 - 83)  BP: 183/83 (08-29-23 @ 15:47) (167/75 - 183/83)  RR: 18 (08-29-23 @ 15:47) (18 - 20)  SpO2: 97% (08-29-23 @ 15:47) (95% - 97%)  Wt(kg): --Vital Signs Last 24 Hrs  T(C): 36 (29 Aug 2023 15:47), Max: 38.1 (28 Aug 2023 16:43)  T(F): 96.8 (29 Aug 2023 15:47), Max: 100.5 (28 Aug 2023 16:43)  HR: 83 (29 Aug 2023 15:47) (68 - 83)  BP: 183/83 (29 Aug 2023 15:47) (167/75 - 183/83)  BP(mean): --  RR: 18 (29 Aug 2023 15:47) (18 - 20)  SpO2: 97% (29 Aug 2023 15:47) (95% - 97%)    Parameters below as of 29 Aug 2023 15:47  Patient On (Oxygen Delivery Method): room air      No Known Allergies      PHYSICAL EXAM:    GENERAL:  73y/o Male NAD, resting comfortably.  HEAD:  Atraumatic, Normocephalic  EYES: EOMI, PERRLA, conjunctiva and sclera clear  NECK: Supple, No JVD, no cervical lymphadenopathy, non-tender  CHEST/LUNG: Clear to auscultation bilaterally; No wheeze, rhonchi, or rales  HEART: Regular rate and rhythm; S1&S2  ABDOMEN: Soft, mildly tender on mid and right upper quadrant, Nondistended x 4 quadrants; Bowel sounds present  EXTREMITIES:   Peripheral Pulses Present, No clubbing, no cyanosis, or no edema, no calf tenderness  PSYCH: AAOx3, cooperative, appropriate  NEUROLOGY: WNL  SKIN: WNL    Consultant(s) Notes Reviewed:  [x ] YES  [ ] NO  Care Discussed with Consultants/Other Providers [ x] YES  [ ] NO    LABS:      RADIOLOGY & ADDITIONAL TESTS:    Imaging Personally Reviewed:  [ ] YES  [ ] NO  acetaminophen     Tablet .. 650 milliGRAM(s) Oral every 6 hours PRN  aluminum hydroxide/magnesium hydroxide/simethicone Suspension 30 milliLiter(s) Oral every 4 hours PRN  aspirin enteric coated 81 milliGRAM(s) Oral daily  atorvastatin 20 milliGRAM(s) Oral at bedtime  dextrose 5%. 1000 milliLiter(s) IV Continuous <Continuous>  dextrose 5%. 1000 milliLiter(s) IV Continuous <Continuous>  dextrose 50% Injectable 25 Gram(s) IV Push once  dextrose 50% Injectable 12.5 Gram(s) IV Push once  dextrose 50% Injectable 25 Gram(s) IV Push once  dextrose Oral Gel 15 Gram(s) Oral once PRN  gabapentin 100 milliGRAM(s) Oral daily  glucagon  Injectable 1 milliGRAM(s) IntraMuscular once  heparin   Injectable 5000 Unit(s) SubCutaneous every 12 hours  insulin lispro (ADMELOG) corrective regimen sliding scale   SubCutaneous three times a day before meals  magnesium sulfate  IVPB 2 Gram(s) IV Intermittent once  melatonin 5 milliGRAM(s) Oral at bedtime PRN  ondansetron Injectable 4 milliGRAM(s) IV Push every 8 hours PRN  pantoprazole    Tablet 40 milliGRAM(s) Oral before breakfast  piperacillin/tazobactam IVPB.. 3.375 Gram(s) IV Intermittent every 8 hours  sodium chloride 0.9%. 1000 milliLiter(s) IV Continuous <Continuous>      HEALTH ISSUES - PROBLEM Dx:    Patient is 71 y/o male  with pMHX of Type 2 diabetes, High cholesterol, GERD, Hx of Pancreatitis, Everyday heavy smoker (2-3 PPD), and peripheral neuropathy who presents with       #Abdm pain/fever  #lactic acidosis resolved  -Abdm CT scan shows Cholelithiasis with mild gallbladder wall thickening. Findings are indeterminate. Recommend abdominal ultrasound for further evaluation.  Essentially stable splenomegaly.  -abdm US shows Cholelithiasis and sludge without sonographic evidence of acute cholecystitis.  Hepatic steatosis  - HIDA scan (+)  -RVP negative   -NPO  -IVF  -IV antibiotic pending cultures  -GI and surgery consult     #DM2  -Hold oral medications,  ISS  -Monitor POC   FS AC/HS   insulin scale    #HLD/neuropathy/GERD  -continue with home medications      #Chest pain  -EKG shows no acute ischemic change  -Trop negative x1  -Will obtain serial trop, TTE    ===========Incomeplete note      Total time spent to complete patient's bedside assessment, review medical chart, discuss medical plan of care with covering medical team was ________ with > 50% of time spent face to face w/ patient, discussion with patient/family and/or coordination of care Progress note    INTERVAL HPI/OVERNIGHT EVENTS:    Patient seen and examined at bedside. He admits to some abd pain in the RUQ. No subjective fevers or chills noted.      REVIEW OF SYSTEMS:  All other 13 Review of systems were reviewed and are negative    FAMILY HISTORY:  No pertinent family history in first degree relatives      T(C): 36 (08-29-23 @ 15:47), Max: 38.1 (08-28-23 @ 16:43)  HR: 83 (08-29-23 @ 15:47) (68 - 83)  BP: 183/83 (08-29-23 @ 15:47) (167/75 - 183/83)  RR: 18 (08-29-23 @ 15:47) (18 - 20)  SpO2: 97% (08-29-23 @ 15:47) (95% - 97%)  Wt(kg): --Vital Signs Last 24 Hrs  T(C): 36 (29 Aug 2023 15:47), Max: 38.1 (28 Aug 2023 16:43)  T(F): 96.8 (29 Aug 2023 15:47), Max: 100.5 (28 Aug 2023 16:43)  HR: 83 (29 Aug 2023 15:47) (68 - 83)  BP: 183/83 (29 Aug 2023 15:47) (167/75 - 183/83)  BP(mean): --  RR: 18 (29 Aug 2023 15:47) (18 - 20)  SpO2: 97% (29 Aug 2023 15:47) (95% - 97%)    Parameters below as of 29 Aug 2023 15:47  Patient On (Oxygen Delivery Method): room air      No Known Allergies      PHYSICAL EXAM:    GENERAL:  73y/o Male NAD, resting comfortably.  HEAD:  Atraumatic, Normocephalic  EYES: EOMI, PERRLA, conjunctiva and sclera clear  NECK: Supple, No JVD, no cervical lymphadenopathy, non-tender  CHEST/LUNG: Clear to auscultation bilaterally; No wheeze, rhonchi, or rales  HEART: Regular rate and rhythm; S1&S2  ABDOMEN: Soft, mildly tender on mid and right upper quadrant, Nondistended x 4 quadrants; Bowel sounds present  EXTREMITIES:   Peripheral Pulses Present, No clubbing, no cyanosis, or no edema, no calf tenderness  PSYCH: AAOx3, cooperative, appropriate  NEUROLOGY: WNL  SKIN: WNL    Consultant(s) Notes Reviewed:  [x ] YES  [ ] NO  Care Discussed with Consultants/Other Providers [ x] YES  [ ] NO    LABS:      RADIOLOGY & ADDITIONAL TESTS:    Imaging Personally Reviewed:  [ ] YES  [ ] NO  acetaminophen     Tablet .. 650 milliGRAM(s) Oral every 6 hours PRN  aluminum hydroxide/magnesium hydroxide/simethicone Suspension 30 milliLiter(s) Oral every 4 hours PRN  aspirin enteric coated 81 milliGRAM(s) Oral daily  atorvastatin 20 milliGRAM(s) Oral at bedtime  dextrose 5%. 1000 milliLiter(s) IV Continuous <Continuous>  dextrose 5%. 1000 milliLiter(s) IV Continuous <Continuous>  dextrose 50% Injectable 25 Gram(s) IV Push once  dextrose 50% Injectable 12.5 Gram(s) IV Push once  dextrose 50% Injectable 25 Gram(s) IV Push once  dextrose Oral Gel 15 Gram(s) Oral once PRN  gabapentin 100 milliGRAM(s) Oral daily  glucagon  Injectable 1 milliGRAM(s) IntraMuscular once  heparin   Injectable 5000 Unit(s) SubCutaneous every 12 hours  insulin lispro (ADMELOG) corrective regimen sliding scale   SubCutaneous three times a day before meals  magnesium sulfate  IVPB 2 Gram(s) IV Intermittent once  melatonin 5 milliGRAM(s) Oral at bedtime PRN  ondansetron Injectable 4 milliGRAM(s) IV Push every 8 hours PRN  pantoprazole    Tablet 40 milliGRAM(s) Oral before breakfast  piperacillin/tazobactam IVPB.. 3.375 Gram(s) IV Intermittent every 8 hours  sodium chloride 0.9%. 1000 milliLiter(s) IV Continuous <Continuous>      HEALTH ISSUES - PROBLEM Dx:    Patient is 71 y/o male  with pMHX of Type 2 diabetes, High cholesterol, GERD, Hx of Pancreatitis, Everyday heavy smoker (2-3 PPD), and peripheral neuropathy who presents with       #Abdm pain/fever  #lactic acidosis resolved  -Abdm CT scan shows Cholelithiasis with mild gallbladder wall thickening. Findings are indeterminate. Recommend abdominal ultrasound for further evaluation.  Essentially stable splenomegaly.  -abdm US shows Cholelithiasis and sludge without sonographic evidence of acute cholecystitis.  Hepatic steatosis  - HIDA scan (+)  -RVP negative   -NPO  -IVF  -IV antibiotic pending cultures  -GI and surgery consult   Preoperative risk stratification:   William risk stratification 0.0% risk of MI or cardiac arrest, intraoperatively or up to 30 days post-op.  RCRI: 1 - class II risk - 6.0% 30 day risk of death, MI or cardiac arrest (pathologic q waves)  METS > 4  Patient denies any chest pain, shortness of breath, MORGAN or palpitations  Chest X-Ray reviewed - no pleural effusion  EKG reviewed no ischemic changes, Trops (-) x2, 3rd is pending  Patient is intermediate risk for intermediate risk procedure    #DM2  -Hold oral medications, ISS  -Monitor POC   FS AC/HS   insulin scale    #HLD/neuropathy/GERD  -continue with home medications      #Chest pain vs epigastric pain?  -EKG shows no acute ischemic change  -Trops (-) x2, pending 3rd troponin  -TTE pending   Progress note    INTERVAL HPI/OVERNIGHT EVENTS:    Patient seen and examined at bedside. He admits to some abd pain in the RUQ. No subjective fevers or chills noted.      REVIEW OF SYSTEMS:  All other 13 Review of systems were reviewed and are negative    FAMILY HISTORY:  No pertinent family history in first degree relatives      T(C): 36 (08-29-23 @ 15:47), Max: 38.1 (08-28-23 @ 16:43)  HR: 83 (08-29-23 @ 15:47) (68 - 83)  BP: 183/83 (08-29-23 @ 15:47) (167/75 - 183/83)  RR: 18 (08-29-23 @ 15:47) (18 - 20)  SpO2: 97% (08-29-23 @ 15:47) (95% - 97%)  Wt(kg): --Vital Signs Last 24 Hrs  T(C): 36 (29 Aug 2023 15:47), Max: 38.1 (28 Aug 2023 16:43)  T(F): 96.8 (29 Aug 2023 15:47), Max: 100.5 (28 Aug 2023 16:43)  HR: 83 (29 Aug 2023 15:47) (68 - 83)  BP: 183/83 (29 Aug 2023 15:47) (167/75 - 183/83)  BP(mean): --  RR: 18 (29 Aug 2023 15:47) (18 - 20)  SpO2: 97% (29 Aug 2023 15:47) (95% - 97%)    Parameters below as of 29 Aug 2023 15:47  Patient On (Oxygen Delivery Method): room air      No Known Allergies      PHYSICAL EXAM:    GENERAL:  71y/o Male NAD, resting comfortably.  HEAD:  Atraumatic, Normocephalic  EYES: EOMI, PERRLA, conjunctiva and sclera clear  NECK: Supple, No JVD, no cervical lymphadenopathy, non-tender  CHEST/LUNG: Clear to auscultation bilaterally; No wheeze, rhonchi, or rales  HEART: Regular rate and rhythm; S1&S2  ABDOMEN: Soft, mildly tender on mid and right upper quadrant, Nondistended x 4 quadrants; Bowel sounds present  EXTREMITIES:   Peripheral Pulses Present, No clubbing, no cyanosis, or no edema, no calf tenderness  PSYCH: AAOx3, cooperative, appropriate  NEUROLOGY: WNL  SKIN: WNL    Consultant(s) Notes Reviewed:  [x ] YES  [ ] NO  Care Discussed with Consultants/Other Providers [ x] YES  [ ] NO    LABS:      RADIOLOGY & ADDITIONAL TESTS:    Imaging Personally Reviewed:  [ ] YES  [ ] NO  acetaminophen     Tablet .. 650 milliGRAM(s) Oral every 6 hours PRN  aluminum hydroxide/magnesium hydroxide/simethicone Suspension 30 milliLiter(s) Oral every 4 hours PRN  aspirin enteric coated 81 milliGRAM(s) Oral daily  atorvastatin 20 milliGRAM(s) Oral at bedtime  dextrose 5%. 1000 milliLiter(s) IV Continuous <Continuous>  dextrose 5%. 1000 milliLiter(s) IV Continuous <Continuous>  dextrose 50% Injectable 25 Gram(s) IV Push once  dextrose 50% Injectable 12.5 Gram(s) IV Push once  dextrose 50% Injectable 25 Gram(s) IV Push once  dextrose Oral Gel 15 Gram(s) Oral once PRN  gabapentin 100 milliGRAM(s) Oral daily  glucagon  Injectable 1 milliGRAM(s) IntraMuscular once  heparin   Injectable 5000 Unit(s) SubCutaneous every 12 hours  insulin lispro (ADMELOG) corrective regimen sliding scale   SubCutaneous three times a day before meals  magnesium sulfate  IVPB 2 Gram(s) IV Intermittent once  melatonin 5 milliGRAM(s) Oral at bedtime PRN  ondansetron Injectable 4 milliGRAM(s) IV Push every 8 hours PRN  pantoprazole    Tablet 40 milliGRAM(s) Oral before breakfast  piperacillin/tazobactam IVPB.. 3.375 Gram(s) IV Intermittent every 8 hours  sodium chloride 0.9%. 1000 milliLiter(s) IV Continuous <Continuous>      HEALTH ISSUES - PROBLEM Dx:    Patient is 71 y/o male  with pMHX of Type 2 diabetes, High cholesterol, GERD, Hx of Pancreatitis, Everyday heavy smoker (2-3 PPD), and peripheral neuropathy who presents with       #Abdm pain/fever  #lactic acidosis resolved  -Abdm CT scan shows Cholelithiasis with mild gallbladder wall thickening. Findings are indeterminate. Recommend abdominal ultrasound for further evaluation.  Essentially stable splenomegaly.  -abdm US shows Cholelithiasis and sludge without sonographic evidence of acute cholecystitis.  Hepatic steatosis  - HIDA scan (+)  -RVP negative   -NPO  -IVF  -IV antibiotic pending cultures  -GI and surgery consult   -Consult cardiology for preoperative risk stratification    #DM2  -Hold oral medications, ISS  -Monitor POC   FS AC/HS   insulin scale    #HLD/neuropathy/GERD  -continue with home medications      #Chest pain vs epigastric pain?  -EKG shows no acute ischemic change  -Trops (-) x2, pending 3rd troponin  -TTE pending   Progress note    INTERVAL HPI/OVERNIGHT EVENTS:    Patient seen and examined at bedside. He admits to some abd pain in the RUQ. No subjective fevers or chills noted.      REVIEW OF SYSTEMS:  All other 13 Review of systems were reviewed and are negative    FAMILY HISTORY:  No pertinent family history in first degree relatives      T(C): 36 (08-29-23 @ 15:47), Max: 38.1 (08-28-23 @ 16:43)  HR: 83 (08-29-23 @ 15:47) (68 - 83)  BP: 183/83 (08-29-23 @ 15:47) (167/75 - 183/83)  RR: 18 (08-29-23 @ 15:47) (18 - 20)  SpO2: 97% (08-29-23 @ 15:47) (95% - 97%)  Wt(kg): --Vital Signs Last 24 Hrs  T(C): 36 (29 Aug 2023 15:47), Max: 38.1 (28 Aug 2023 16:43)  T(F): 96.8 (29 Aug 2023 15:47), Max: 100.5 (28 Aug 2023 16:43)  HR: 83 (29 Aug 2023 15:47) (68 - 83)  BP: 183/83 (29 Aug 2023 15:47) (167/75 - 183/83)  BP(mean): --  RR: 18 (29 Aug 2023 15:47) (18 - 20)  SpO2: 97% (29 Aug 2023 15:47) (95% - 97%)    Parameters below as of 29 Aug 2023 15:47  Patient On (Oxygen Delivery Method): room air      No Known Allergies      PHYSICAL EXAM:    GENERAL:  71y/o Male NAD, resting comfortably.  HEAD:  Atraumatic, Normocephalic  EYES: EOMI, PERRLA, conjunctiva and sclera clear  NECK: Supple, No JVD, no cervical lymphadenopathy, non-tender  CHEST/LUNG: Clear to auscultation bilaterally; No wheeze, rhonchi, or rales  HEART: Regular rate and rhythm; S1&S2  ABDOMEN: Soft, mildly tender on mid and right upper quadrant, Nondistended x 4 quadrants; Bowel sounds present  EXTREMITIES:   Peripheral Pulses Present, No clubbing, no cyanosis, or no edema, no calf tenderness  PSYCH: AAOx3, cooperative, appropriate  NEUROLOGY: WNL  SKIN: WNL    Consultant(s) Notes Reviewed:  [x ] YES  [ ] NO  Care Discussed with Consultants/Other Providers [ x] YES  [ ] NO    LABS:      RADIOLOGY & ADDITIONAL TESTS:    Imaging Personally Reviewed:  [ ] YES  [ ] NO  acetaminophen     Tablet .. 650 milliGRAM(s) Oral every 6 hours PRN  aluminum hydroxide/magnesium hydroxide/simethicone Suspension 30 milliLiter(s) Oral every 4 hours PRN  aspirin enteric coated 81 milliGRAM(s) Oral daily  atorvastatin 20 milliGRAM(s) Oral at bedtime  dextrose 5%. 1000 milliLiter(s) IV Continuous <Continuous>  dextrose 5%. 1000 milliLiter(s) IV Continuous <Continuous>  dextrose 50% Injectable 25 Gram(s) IV Push once  dextrose 50% Injectable 12.5 Gram(s) IV Push once  dextrose 50% Injectable 25 Gram(s) IV Push once  dextrose Oral Gel 15 Gram(s) Oral once PRN  gabapentin 100 milliGRAM(s) Oral daily  glucagon  Injectable 1 milliGRAM(s) IntraMuscular once  heparin   Injectable 5000 Unit(s) SubCutaneous every 12 hours  insulin lispro (ADMELOG) corrective regimen sliding scale   SubCutaneous three times a day before meals  magnesium sulfate  IVPB 2 Gram(s) IV Intermittent once  melatonin 5 milliGRAM(s) Oral at bedtime PRN  ondansetron Injectable 4 milliGRAM(s) IV Push every 8 hours PRN  pantoprazole    Tablet 40 milliGRAM(s) Oral before breakfast  piperacillin/tazobactam IVPB.. 3.375 Gram(s) IV Intermittent every 8 hours  sodium chloride 0.9%. 1000 milliLiter(s) IV Continuous <Continuous>      HEALTH ISSUES - PROBLEM Dx:    Patient is 71 y/o male  with pMHX of Type 2 diabetes, High cholesterol, GERD, Hx of Pancreatitis, Everyday heavy smoker (2-3 PPD), and peripheral neuropathy who presents with       #Abdm pain/fever  #lactic acidosis resolved  -Abdm CT scan shows Cholelithiasis with mild gallbladder wall thickening. Findings are indeterminate. Recommend abdominal ultrasound for further evaluation.  Essentially stable splenomegaly.  -abdm US shows Cholelithiasis and sludge without sonographic evidence of acute cholecystitis.  Hepatic steatosis  - HIDA scan (+)  -RVP negative   -NPO  -IVF  -IV antibiotic pending cultures  -GI and surgery consult   -Consult cardiology for preoperative risk stratification  -For possible lap idania tomorrow    #DM2  -Hold oral medications, ISS  -Monitor POC   FS AC/HS   insulin scale    #HLD/neuropathy/GERD  -continue with home medications      #Chest pain vs epigastric pain?  -EKG shows no acute ischemic change  -Trops (-) x2, pending 3rd troponin  -TTE pending

## 2023-08-29 NOTE — CONSULT NOTE ADULT - ASSESSMENT
73 y/o male with past medical  history of Type 2 diabetes, High cholesterol, GERD, Hx of Pancreatitis, Everyday heavy smoker (2-3 PPD), presents for fever and RUQ pain and chest pain on and off for months, patient reports recurrent pancreatitis episodes with no etiology.        Cholelithiasis with mild gallbladder wall thickening. Findings are   indeterminate. Recommend abdominal ultrasound for further evaluation. 71 y/o male with past medical  history of Type 2 diabetes, High cholesterol, GERD, Hx of Pancreatitis, Everyday heavy smoker (2-3 PPD), presents for fever and RUQ pain and chest pain on and off for months, patient reports recurrent pancreatitis episodes with no etiology.      problem 1-abdominal pain  Cholelithiasis with mild gallbladder wall thickening. Findings are   indeterminate. Recommend abdominal ultrasound for further evaluation.  Rec  -await HIDA  -LFTs not elevated, CBD not dilated  -surgery following  no acute GI intervention  -consider lap choley given prior episodes of pancreatitis     Problem 2-constipation  patient passing flatus  Rec  -Miralax daily    Problem 3 -CRC screening   Rec  -Follow up with our [facility] located at [address] [place] [address]. Phone Number: [phone] to schedule CRC screening Colonoscopy

## 2023-08-30 LAB
BLD GP AB SCN SERPL QL: SIGNIFICANT CHANGE UP
GLUCOSE BLDC GLUCOMTR-MCNC: 105 MG/DL — HIGH (ref 70–99)
GLUCOSE BLDC GLUCOMTR-MCNC: 108 MG/DL — HIGH (ref 70–99)
GLUCOSE BLDC GLUCOMTR-MCNC: 109 MG/DL — HIGH (ref 70–99)
GLUCOSE BLDC GLUCOMTR-MCNC: 121 MG/DL — HIGH (ref 70–99)
INR BLD: 1.25 RATIO — SIGNIFICANT CHANGE UP (ref 0.65–1.3)
PROTHROM AB SERPL-ACNC: 14.4 SEC — HIGH (ref 9.95–12.87)

## 2023-08-30 PROCEDURE — 99233 SBSQ HOSP IP/OBS HIGH 50: CPT

## 2023-08-30 PROCEDURE — 93010 ELECTROCARDIOGRAM REPORT: CPT

## 2023-08-30 PROCEDURE — 99222 1ST HOSP IP/OBS MODERATE 55: CPT

## 2023-08-30 PROCEDURE — 93306 TTE W/DOPPLER COMPLETE: CPT | Mod: 26

## 2023-08-30 PROCEDURE — 99232 SBSQ HOSP IP/OBS MODERATE 35: CPT

## 2023-08-30 RX ORDER — AMIODARONE HYDROCHLORIDE 400 MG/1
150 TABLET ORAL ONCE
Refills: 0 | Status: COMPLETED | OUTPATIENT
Start: 2023-08-30 | End: 2023-08-30

## 2023-08-30 RX ORDER — METOPROLOL TARTRATE 50 MG
5 TABLET ORAL ONCE
Refills: 0 | Status: DISCONTINUED | OUTPATIENT
Start: 2023-08-30 | End: 2023-08-30

## 2023-08-30 RX ORDER — ENOXAPARIN SODIUM 100 MG/ML
90 INJECTION SUBCUTANEOUS EVERY 12 HOURS
Refills: 0 | Status: DISCONTINUED | OUTPATIENT
Start: 2023-08-30 | End: 2023-08-31

## 2023-08-30 RX ORDER — AMIODARONE HYDROCHLORIDE 400 MG/1
0.5 TABLET ORAL
Qty: 450 | Refills: 0 | Status: DISCONTINUED | OUTPATIENT
Start: 2023-08-30 | End: 2023-08-31

## 2023-08-30 RX ORDER — METOPROLOL TARTRATE 50 MG
50 TABLET ORAL
Refills: 0 | Status: DISCONTINUED | OUTPATIENT
Start: 2023-08-30 | End: 2023-08-30

## 2023-08-30 RX ORDER — METOPROLOL TARTRATE 50 MG
10 TABLET ORAL ONCE
Refills: 0 | Status: DISCONTINUED | OUTPATIENT
Start: 2023-08-30 | End: 2023-08-30

## 2023-08-30 RX ORDER — LABETALOL HCL 100 MG
10 TABLET ORAL ONCE
Refills: 0 | Status: DISCONTINUED | OUTPATIENT
Start: 2023-08-30 | End: 2023-08-30

## 2023-08-30 RX ORDER — AMIODARONE HYDROCHLORIDE 400 MG/1
0.01 TABLET ORAL
Qty: 450 | Refills: 0 | Status: DISCONTINUED | OUTPATIENT
Start: 2023-08-30 | End: 2023-08-30

## 2023-08-30 RX ORDER — AMIODARONE HYDROCHLORIDE 400 MG/1
1 TABLET ORAL
Qty: 450 | Refills: 0 | Status: DISCONTINUED | OUTPATIENT
Start: 2023-08-30 | End: 2023-08-31

## 2023-08-30 RX ADMIN — AMIODARONE HYDROCHLORIDE 16.7 MG/MIN: 400 TABLET ORAL at 21:43

## 2023-08-30 RX ADMIN — PIPERACILLIN AND TAZOBACTAM 25 GRAM(S): 4; .5 INJECTION, POWDER, LYOPHILIZED, FOR SOLUTION INTRAVENOUS at 21:43

## 2023-08-30 RX ADMIN — PIPERACILLIN AND TAZOBACTAM 25 GRAM(S): 4; .5 INJECTION, POWDER, LYOPHILIZED, FOR SOLUTION INTRAVENOUS at 05:59

## 2023-08-30 RX ADMIN — Medication 650 MILLIGRAM(S): at 10:41

## 2023-08-30 RX ADMIN — Medication 650 MILLIGRAM(S): at 11:41

## 2023-08-30 RX ADMIN — Medication 81 MILLIGRAM(S): at 14:39

## 2023-08-30 RX ADMIN — SODIUM CHLORIDE 75 MILLILITER(S): 9 INJECTION INTRAMUSCULAR; INTRAVENOUS; SUBCUTANEOUS at 13:44

## 2023-08-30 RX ADMIN — PANTOPRAZOLE SODIUM 40 MILLIGRAM(S): 20 TABLET, DELAYED RELEASE ORAL at 05:59

## 2023-08-30 RX ADMIN — AMIODARONE HYDROCHLORIDE 600 MILLIGRAM(S): 400 TABLET ORAL at 13:49

## 2023-08-30 RX ADMIN — ENOXAPARIN SODIUM 90 MILLIGRAM(S): 100 INJECTION SUBCUTANEOUS at 18:30

## 2023-08-30 RX ADMIN — GABAPENTIN 100 MILLIGRAM(S): 400 CAPSULE ORAL at 14:39

## 2023-08-30 RX ADMIN — PIPERACILLIN AND TAZOBACTAM 25 GRAM(S): 4; .5 INJECTION, POWDER, LYOPHILIZED, FOR SOLUTION INTRAVENOUS at 14:35

## 2023-08-30 RX ADMIN — ATORVASTATIN CALCIUM 20 MILLIGRAM(S): 80 TABLET, FILM COATED ORAL at 21:43

## 2023-08-30 RX ADMIN — AMIODARONE HYDROCHLORIDE 33.3 MG/MIN: 400 TABLET ORAL at 14:03

## 2023-08-30 NOTE — PROGRESS NOTE ADULT - SUBJECTIVE AND OBJECTIVE BOX
72yMale  Being followed for acute cholecystitis   Interval history: Patient denies nausea, vomiting, hematemesis, melena, blood in stool, diarrhea, constipation, abdominal pain. Patient tolerating diet. Patient with positive HIDA.      PAST MEDICAL & SURGICAL HISTORY:   High cholesterol      Elevated cholesterol with high triglycerides      Pancreatitis      Acid reflux      BP (high blood pressure)      No significant past surgical history                Social History: No smoking. No alcohol. No illegal drug use.          MEDICATIONS  (STANDING):  aspirin enteric coated 81 milliGRAM(s) Oral daily  atorvastatin 20 milliGRAM(s) Oral at bedtime  dextrose 5%. 1000 milliLiter(s) (50 mL/Hr) IV Continuous <Continuous>  dextrose 5%. 1000 milliLiter(s) (100 mL/Hr) IV Continuous <Continuous>  dextrose 50% Injectable 25 Gram(s) IV Push once  dextrose 50% Injectable 12.5 Gram(s) IV Push once  dextrose 50% Injectable 25 Gram(s) IV Push once  gabapentin 100 milliGRAM(s) Oral daily  glucagon  Injectable 1 milliGRAM(s) IntraMuscular once  heparin   Injectable 5000 Unit(s) SubCutaneous every 12 hours  insulin lispro (ADMELOG) corrective regimen sliding scale   SubCutaneous three times a day before meals  pantoprazole    Tablet 40 milliGRAM(s) Oral before breakfast  piperacillin/tazobactam IVPB.. 3.375 Gram(s) IV Intermittent every 8 hours  sodium chloride 0.9%. 1000 milliLiter(s) (75 mL/Hr) IV Continuous <Continuous>    MEDICATIONS  (PRN):  acetaminophen     Tablet .. 650 milliGRAM(s) Oral every 6 hours PRN Temp greater or equal to 38C (100.4F), Mild Pain (1 - 3)  aluminum hydroxide/magnesium hydroxide/simethicone Suspension 30 milliLiter(s) Oral every 4 hours PRN Dyspepsia  dextrose Oral Gel 15 Gram(s) Oral once PRN Blood Glucose LESS THAN 70 milliGRAM(s)/deciliter  melatonin 5 milliGRAM(s) Oral at bedtime PRN Insomnia  ondansetron Injectable 4 milliGRAM(s) IV Push every 8 hours PRN Nausea and/or Vomiting      Allergies:   No Known Allergies              REVIEW OF SYSTEMS:  General:  No weight loss, fevers, or chills.  Eyes:  No reported pain or visual changes  ENT:  No sore throat or runny nose.  NECK: No stiffness   CV:  No chest pain or palpitations.  Resp:  No shortness of breath, cough  GI:  No abdominal pain, nausea, vomiting, dysphagia, diarrhea or constipation. No rectal bleeding, melena, or hematemesis.  Muscle:  No aches or weakness  Neuro:  No tingling, numbness         VITAL SIGNS:   T(F): 97.4 (08-30-23 @ 11:44), Max: 97.8 (08-30-23 @ 05:01)  HR: 51 (08-30-23 @ 11:44) (51 - 83)  BP: 177/81 (08-30-23 @ 11:44) (153/69 - 183/83)  RR: 16 (08-30-23 @ 11:44) (16 - 18)  SpO2: 96% (08-30-23 @ 11:44) (96% - 97%)    PHYSICAL EXAM:  GENERAL: AAOx3, no acute distress.  HEAD:  Atraumatic, Normocephalic  EYES: conjunctiva and sclera clear  NECK: Supple, no JVD or thyromegaly  CHEST/LUNG: Clear to auscultation bilaterally; No wheeze, rhonchi, or rales  HEART: Regular rate and rhythm; normal S1, S2, No murmurs.  ABDOMEN: Soft, nontender, nondistended; Bowel sounds present  NEUROLOGY: No asterixis or tremor.   SKIN: Intact, no jaundice            LABS:                        13.1   11.06 )-----------( 187      ( 29 Aug 2023 06:31 )             39.0     08-29    134<L>  |  98  |  14  ----------------------------<  92  4.1   |  26  |  0.8    Ca    9.7      29 Aug 2023 06:31  Phos  2.2     08-29  Mg     1.4     08-29    TPro  6.5  /  Alb  3.9  /  TBili  0.5  /  DBili  x   /  AST  27  /  ALT  28  /  AlkPhos  27<L>  08-29    LIVER FUNCTIONS - ( 29 Aug 2023 06:31 )  Alb: 3.9 g/dL / Pro: 6.5 g/dL / ALK PHOS: 27 U/L / ALT: 28 U/L / AST: 27 U/L / GGT: x               IMAGING:  < from: US Abdomen Upper Quadrant Right (08.28.23 @ 17:09) >    ACC: [id number] EXAM:  US ABDOMEN RT UPR QUADRANT   ORDERED BY: KENIA LOYOLA     PROCEDURE DATE:  08/28/2023          INTERPRETATION:  CLINICAL INFORMATION: Gallstones    COMPARISON: CT dated 8/28/2023    TECHNIQUE: Sonography of the right upper quadrant.    FINDINGS:  Liver: Echogenic liver compatible with steatosis. Hypoechoic region along   the gallbladder likely representing focal fatty sparing.  Bile ducts: Normal caliber. Common bile duct measures 5 mm.  Gallbladder: Cholelithiasis and sludge. Negative sonographic Garcia's   sign.  Pancreas: Poorly visualized.  Right kidney: 13.4 cm. No hydronephrosis. 3.7 cm cyst.  Ascites: None.      IMPRESSION:    Cholelithiasis and sludge without sonographic evidence of acute   cholecystitis    Hepatic steatosis    --- End of Report ---            ANGELINA AVALOS MD; Attending Radiologist  This document has been electronically signed. Aug 28 2023  7:00PM    < end of copied text >

## 2023-08-30 NOTE — PROGRESS NOTE ADULT - ASSESSMENT
71 y/o male with past medical  history of Type 2 diabetes, High cholesterol, GERD, Hx of Pancreatitis, Everyday heavy smoker (2-3 PPD), presents for fever and RUQ pain and chest pain on and off for months, patient reports recurrent pancreatitis episodes with no etiology.    problem 1-abdominal pain  Cholelithiasis with mild gallbladder wall thickening. Findings are   indeterminate. Recommend abdominal ultrasound for further evaluation.  Rec  -HIDA positive surgery planning for OR  -LFTs not elevated, CBD not dilated  -surgery following  no acute GI intervention  -consider lap choley given prior episodes of pancreatitis     Problem 2-constipation  patient passing flatus  Rec  -Miralax daily    Problem 3 -CRC screening   Rec  -Follow up with our [facility] located at [address] [place] [address]. Phone Number: [phone] to schedule CRC screening Colonoscopy

## 2023-08-30 NOTE — PROGRESS NOTE ADULT - ASSESSMENT
MEDICATIONS  (STANDING):  aspirin enteric coated 81 milliGRAM(s) Oral daily  atorvastatin 20 milliGRAM(s) Oral at bedtime  dextrose 5%. 1000 milliLiter(s) (50 mL/Hr) IV Continuous <Continuous>  dextrose 5%. 1000 milliLiter(s) (100 mL/Hr) IV Continuous <Continuous>  dextrose 50% Injectable 25 Gram(s) IV Push once  dextrose 50% Injectable 12.5 Gram(s) IV Push once  dextrose 50% Injectable 25 Gram(s) IV Push once  gabapentin 100 milliGRAM(s) Oral daily  glucagon  Injectable 1 milliGRAM(s) IntraMuscular once  heparin   Injectable 5000 Unit(s) SubCutaneous every 12 hours  insulin lispro (ADMELOG) corrective regimen sliding scale   SubCutaneous three times a day before meals  labetalol Injectable 10 milliGRAM(s) IV Push once  metoprolol tartrate 50 milliGRAM(s) Oral two times a day  pantoprazole    Tablet 40 milliGRAM(s) Oral before breakfast  piperacillin/tazobactam IVPB.. 3.375 Gram(s) IV Intermittent every 8 hours  sodium chloride 0.9%. 1000 milliLiter(s) (75 mL/Hr) IV Continuous <Continuous>    MEDICATIONS  (PRN):  acetaminophen     Tablet .. 650 milliGRAM(s) Oral every 6 hours PRN Temp greater or equal to 38C (100.4F), Mild Pain (1 - 3)  aluminum hydroxide/magnesium hydroxide/simethicone Suspension 30 milliLiter(s) Oral every 4 hours PRN Dyspepsia  dextrose Oral Gel 15 Gram(s) Oral once PRN Blood Glucose LESS THAN 70 milliGRAM(s)/deciliter  melatonin 5 milliGRAM(s) Oral at bedtime PRN Insomnia  ondansetron Injectable 4 milliGRAM(s) IV Push every 8 hours PRN Nausea and/or Vomiting    Assessment /Plan     Patient is 71 y/o male  with pMHX of Type 2 diabetes, High cholesterol, GERD, Hx of Pancreatitis, Everyday heavy smoker (2-3 PPD), and peripheral neuropathy who presents with       #Abdm pain/fever  #lactic acidosis resolved  -Abdm CT scan shows Cholelithiasis with mild gallbladder wall thickening. Findings are indeterminate. Recommend abdominal ultrasound for further evaluation.  Essentially stable splenomegaly.  -abdm US shows Cholelithiasis and sludge without sonographic evidence of acute cholecystitis.  Hepatic steatosis  - HIDA scan (+)  -RVP negative   -NPO  -IVF  -IV antibiotic pending cultures  -GI and surgery consult   -Consult cardiology for preoperative risk stratification  -For possible lap idania tomorrow    Atrial fib with RVR   - lopressor 10 mg IV PUSH now   - metoprolol 50 MG BID.     #DM2  -Hold oral medications, ISS  -Monitor POC   FS AC/HS   insulin scale    #HLD/neuropathy/GERD  -continue with home medications      #Chest pain vs epigastric pain?  -EKG shows no acute ischemic change  -Trops (-) x2, pending 3rd troponin  -TTE pending    Full code      MEDICATIONS  (STANDING):  aspirin enteric coated 81 milliGRAM(s) Oral daily  atorvastatin 20 milliGRAM(s) Oral at bedtime  dextrose 5%. 1000 milliLiter(s) (50 mL/Hr) IV Continuous <Continuous>  dextrose 5%. 1000 milliLiter(s) (100 mL/Hr) IV Continuous <Continuous>  dextrose 50% Injectable 25 Gram(s) IV Push once  dextrose 50% Injectable 12.5 Gram(s) IV Push once  dextrose 50% Injectable 25 Gram(s) IV Push once  gabapentin 100 milliGRAM(s) Oral daily  glucagon  Injectable 1 milliGRAM(s) IntraMuscular once  heparin   Injectable 5000 Unit(s) SubCutaneous every 12 hours  insulin lispro (ADMELOG) corrective regimen sliding scale   SubCutaneous three times a day before meals  labetalol Injectable 10 milliGRAM(s) IV Push once  metoprolol tartrate 50 milliGRAM(s) Oral two times a day  pantoprazole    Tablet 40 milliGRAM(s) Oral before breakfast  piperacillin/tazobactam IVPB.. 3.375 Gram(s) IV Intermittent every 8 hours  sodium chloride 0.9%. 1000 milliLiter(s) (75 mL/Hr) IV Continuous <Continuous>    MEDICATIONS  (PRN):  acetaminophen     Tablet .. 650 milliGRAM(s) Oral every 6 hours PRN Temp greater or equal to 38C (100.4F), Mild Pain (1 - 3)  aluminum hydroxide/magnesium hydroxide/simethicone Suspension 30 milliLiter(s) Oral every 4 hours PRN Dyspepsia  dextrose Oral Gel 15 Gram(s) Oral once PRN Blood Glucose LESS THAN 70 milliGRAM(s)/deciliter  melatonin 5 milliGRAM(s) Oral at bedtime PRN Insomnia  ondansetron Injectable 4 milliGRAM(s) IV Push every 8 hours PRN Nausea and/or Vomiting    Assessment /Plan     Patient is 71 y/o male  with pMHX of Type 2 diabetes, High cholesterol, GERD, Hx of Pancreatitis, Everyday heavy smoker (2-3 PPD), and peripheral neuropathy who presents with     Assessment /Plan   Acute cholecystitis due to biliary calculus   -Abdm CT scan shows Cholelithiasis with mild gallbladder wall thickening. Findings are indeterminate. Recommend abdominal ultrasound for further evaluation.  Essentially stable splenomegaly.  -abdm US shows Cholelithiasis and sludge without sonographic evidence of acute cholecystitis.  Hepatic steatosis  - HIDA scan (+)  -RVP negative   - CLD low fat, CCD diet, cont zosyn, -IVF  -GI and surgery consult   -Consult cardiology for preoperative risk stratification: moderate to high risk patient declined stress test   -held lap idania due to new onset atrial fibrillation with RVR.     Atrial fib with RVR   - EKG findings d/w cardiology, start lovenox 1 mg/kg bid, started amio gtt.     Chest pain   -EKG shows no acute ischemic change  -troponin x 4 -ve   -TTE pending results     DMT2 with neuropathy   - hold home diabetic meds  - SSI  tid ac     HLD/GERD  -lipitor ppi     dvt/gi px  - Lovenox mg Subq/ PPI PO     Full code      MEDICATIONS  (STANDING):  aspirin enteric coated 81 milliGRAM(s) Oral daily  atorvastatin 20 milliGRAM(s) Oral at bedtime  dextrose 5%. 1000 milliLiter(s) (50 mL/Hr) IV Continuous <Continuous>  dextrose 5%. 1000 milliLiter(s) (100 mL/Hr) IV Continuous <Continuous>  dextrose 50% Injectable 25 Gram(s) IV Push once  dextrose 50% Injectable 12.5 Gram(s) IV Push once  dextrose 50% Injectable 25 Gram(s) IV Push once  gabapentin 100 milliGRAM(s) Oral daily  glucagon  Injectable 1 milliGRAM(s) IntraMuscular once  heparin   Injectable 5000 Unit(s) SubCutaneous every 12 hours  insulin lispro (ADMELOG) corrective regimen sliding scale   SubCutaneous three times a day before meals  labetalol Injectable 10 milliGRAM(s) IV Push once  metoprolol tartrate 50 milliGRAM(s) Oral two times a day  pantoprazole    Tablet 40 milliGRAM(s) Oral before breakfast  piperacillin/tazobactam IVPB.. 3.375 Gram(s) IV Intermittent every 8 hours  sodium chloride 0.9%. 1000 milliLiter(s) (75 mL/Hr) IV Continuous <Continuous>    MEDICATIONS  (PRN):  acetaminophen     Tablet .. 650 milliGRAM(s) Oral every 6 hours PRN Temp greater or equal to 38C (100.4F), Mild Pain (1 - 3)  aluminum hydroxide/magnesium hydroxide/simethicone Suspension 30 milliLiter(s) Oral every 4 hours PRN Dyspepsia  dextrose Oral Gel 15 Gram(s) Oral once PRN Blood Glucose LESS THAN 70 milliGRAM(s)/deciliter  melatonin 5 milliGRAM(s) Oral at bedtime PRN Insomnia  ondansetron Injectable 4 milliGRAM(s) IV Push every 8 hours PRN Nausea and/or Vomiting    Assessment /Plan     Patient is 71 y/o male  with pMHX of Type 2 diabetes, High cholesterol, GERD, Hx of Pancreatitis, Everyday heavy smoker (2-3 PPD), and peripheral neuropathy who presents with     Assessment /Plan   Acute cholecystitis due to biliary calculus   -Abdm CT scan shows Cholelithiasis with mild gallbladder wall thickening. Findings are indeterminate. Recommend abdominal ultrasound for further evaluation.  Essentially stable splenomegaly.  -abdm US shows Cholelithiasis and sludge without sonographic evidence of acute cholecystitis.  Hepatic steatosis  - HIDA scan (+)  -RVP negative   - CLD low fat, CCD diet, cont zosyn, -IVF  -GI and surgery consult   -Consult cardiology for preoperative risk stratification: moderate to high risk patient declined stress test   -held lap idania due to new onset atrial fibrillation with RVR.     Atrial fib with RVR   - EKG findings d/w cardiology, start lovenox 1 mg/kg bid, started amio gtt.   - [id number] 3   - ECHO pending     Chest pain   -EKG shows no acute ischemic change  -troponin x 4 -ve   -TTE pending results     DMT2 with neuropathy   - hold home diabetic meds  - SSI  tid ac   - cont gabapentin     HLD/GERD  -lipitor ppi     dvt/gi px  - Lovenox mg Subq/ PPI PO     Full code

## 2023-08-30 NOTE — PROGRESS NOTE ADULT - SUBJECTIVE AND OBJECTIVE BOX
GENERAL SURGERY PROGRESS NOTE    Patient: ARIELLE KERR , 72y ([birth year])Male   MRN: [id number]  Location: [address]  Visit: 08-28-23 Inpatient  Date: 08-30-23 @ 14:37      Procedure/Dx/Injuries: acute cholecystitis    Events of past 24 hours: pre-op for OR, cancelled due to new onset afib    PAST MEDICAL & SURGICAL HISTORY:  High cholesterol      Elevated cholesterol with high triglycerides      Pancreatitis      Acid reflux      BP (high blood pressure)      No significant past surgical history          Vitals:   T(F): 99.8 (08-30-23 @ 14:27), Max: 99.8 (08-30-23 @ 14:27)  HR: 68 (08-30-23 @ 14:27)  BP: 149/107 (08-30-23 @ 14:27)  RR: 16 (08-30-23 @ 14:27)  SpO2: 96% (08-30-23 @ 14:27)      Diet, NPO:   Except Medications  With Ice Chips/Sips of Water      Fluids:     I & O's:      PHYSICAL EXAM:  PHYSICAL EXAM:  GENERAL: No acute distress, well-developed  CHEST/LUNG: CTAB;  HEART: irregular irregular   ABDOMEN: Soft, mild RUQ tenderness, non-distended  EXTREMITIES:No clubbing, cyanosis, or edema      MEDICATIONS  (STANDING):  aMIOdarone Infusion 0.5 mG/Min (16.7 mL/Hr) IV Continuous <Continuous>  aMIOdarone Infusion 1 mG/Min (33.3 mL/Hr) IV Continuous <Continuous>  aspirin enteric coated 81 milliGRAM(s) Oral daily  atorvastatin 20 milliGRAM(s) Oral at bedtime  dextrose 5%. 1000 milliLiter(s) (50 mL/Hr) IV Continuous <Continuous>  dextrose 5%. 1000 milliLiter(s) (100 mL/Hr) IV Continuous <Continuous>  dextrose 50% Injectable 25 Gram(s) IV Push once  dextrose 50% Injectable 12.5 Gram(s) IV Push once  dextrose 50% Injectable 25 Gram(s) IV Push once  gabapentin 100 milliGRAM(s) Oral daily  glucagon  Injectable 1 milliGRAM(s) IntraMuscular once  heparin   Injectable 5000 Unit(s) SubCutaneous every 12 hours  insulin lispro (ADMELOG) corrective regimen sliding scale   SubCutaneous three times a day before meals  pantoprazole    Tablet 40 milliGRAM(s) Oral before breakfast  piperacillin/tazobactam IVPB.. 3.375 Gram(s) IV Intermittent every 8 hours  sodium chloride 0.9%. 1000 milliLiter(s) (75 mL/Hr) IV Continuous <Continuous>    MEDICATIONS  (PRN):  acetaminophen     Tablet .. 650 milliGRAM(s) Oral every 6 hours PRN Temp greater or equal to 38C (100.4F), Mild Pain (1 - 3)  aluminum hydroxide/magnesium hydroxide/simethicone Suspension 30 milliLiter(s) Oral every 4 hours PRN Dyspepsia  dextrose Oral Gel 15 Gram(s) Oral once PRN Blood Glucose LESS THAN 70 milliGRAM(s)/deciliter  melatonin 5 milliGRAM(s) Oral at bedtime PRN Insomnia  ondansetron Injectable 4 milliGRAM(s) IV Push every 8 hours PRN Nausea and/or Vomiting      DVT PROPHYLAXIS: heparin   Injectable 5000 Unit(s) SubCutaneous every 12 hours    GI PROPHYLAXIS: pantoprazole    Tablet 40 milliGRAM(s) Oral before breakfast    ANTICOAGULATION:   ANTIBIOTICS:  piperacillin/tazobactam IVPB.. 3.375 Gram(s)            LAB/STUDIES:  Labs:  CAPILLARY BLOOD GLUCOSE      POCT Blood Glucose.: 109 mg/dL (30 Aug 2023 11:40)  POCT Blood Glucose.: 121 mg/dL (30 Aug 2023 07:39)  POCT Blood Glucose.: 94 mg/dL (29 Aug 2023 16:51)                          13.1   11.06 )-----------( 187      ( 29 Aug 2023 06:31 )             39.0         08-29    134<L>  |  98  |  14  ----------------------------<  92  4.1   |  26  |  0.8          LFTs:             6.5  | 0.5  | 27       ------------------[27      ( 29 Aug 2023 06:31 )  3.9  | x    | 28          Lipase:19     Amylase:x         Lactate, Blood: 1.2 mmol/L (08-29-23 @ 06:31)  Blood Gas Venous - Lactate: 1.30 mmol/L (08-28-23 @ 14:12)  Lactate, Blood: 1.4 mmol/L (08-28-23 @ 13:32)  Lactate, Blood: 4.3 mmol/L (08-28-23 @ 10:30)      Coags:    CARDIAC MARKERS ( 29 Aug 2023 21:46 )  x     / <0.01 ng/mL / x     / x     / x      CARDIAC MARKERS ( 29 Aug 2023 15:43 )  x     / <0.01 ng/mL / x     / x     / x      CARDIAC MARKERS ( 29 Aug 2023 06:31 )  x     / <0.01 ng/mL / x     / x     / x              Urinalysis Basic - ( 29 Aug 2023 06:31 )    Color: x / Appearance: x / SG: x / pH: x  Gluc: 92 mg/dL / Ketone: x  / Bili: x / Urobili: x   Blood: x / Protein: x / Nitrite: x   Leuk Esterase: x / RBC: x / WBC x   Sq Epi: x / Non Sq Epi: x / Bacteria: x        Culture - Blood (collected 28 Aug 2023 10:30)  Source: .Blood Blood  Preliminary Report (29 Aug 2023 23:01):    No growth at 24 hours    Culture - Blood (collected 28 Aug 2023 10:30)  Source: .Blood Blood  Preliminary Report (29 Aug 2023 23:01):    No growth at 24 hours    Culture - Urine (collected 28 Aug 2023 10:03)  Source: Clean Catch Clean Catch (Midstream)  Final Report (29 Aug 2023 22:44):    <10,000 CFU/mL Normal Urogenital Lynn              IMAGING:      ACCESS/ DEVICES:  [x ] Peripheral IV  [ ] Central Venous Line	[ ] R	[ ] L	[ ] IJ	[ ] Fem	[ ] SC	Placed:   [ ] Arterial Line		[ ] R	[ ] L	[ ] Fem	[ ] Rad	[ ] Ax	Placed:   [ ] PICC:					[ ] Mediport  [ ] Urinary Catheter,  Date Placed:   [ ] Chest tube: [ ] Right, [ ] Left  [ ] WILMA/Josh Drains

## 2023-08-30 NOTE — CONSULT NOTE ADULT - SUBJECTIVE AND OBJECTIVE BOX
HPI:  Patient is 73 y/o male with past medical  history of Type 2 diabetes, High cholesterol, GERD, Hx of Pancreatitis, Everyday heavy smoker (2-3 PPD), and peripheral neuropathy who presents with his wife to the ER with c/o now resolved chest pain, epigastric pain and fever to 102 at home and constipation x 4 days.   Patient reports the chest pain and epigastric pain started 3-4 days ago and now has been resolved for over 24 hours but he developed a fever to 102 at home so his wife brought him to the ER for evaluation.    Patient reports a history of Pancreatitis x 2 but was told they could not find a cause.  Patient denies being told he had gallstones in the past,  patient denies alcohol use.  He reports Meclizine may had caused one episode of pancreatitis.    Patient reports the pain has now resolved > 24 hours but was located in epigastric area with radiation towards mid chest.    Pain was characterized as sharp in nature and was 8/10.        Patient denies N/V/D, hematemesis, hematochezia, melena, urinary complaints or SO (28 Aug 2023 22:29)        HPI-Cardiology   Pt with the above hx, evaluated at bedside. Consulted for risk stratification for possible lap idania. Pt denies any Hx of cardiac disease, and denies any CP/SOB at rest or exertion, palpitations or dizziness/lightheadedness. Pt endorses 55 year Hx of cigarette smoking 1-2ppd. Radiology tests and hospital records, were reviewed, as well as previous notes on this patient.        PAST MEDICAL & SURGICAL HISTORY  High cholesterol    Elevated cholesterol with high triglycerides    Pancreatitis    Acid reflux    BP (high blood pressure)    No significant past surgical history        FAMILY HISTORY:  FAMILY HISTORY:  No pertinent family history in first degree relatives        SOCIAL HISTORY:  []smoker- currently active smoker since 16 years old of 1-2ppd  []Alcohol-socially  []Drug-denies      ALLERGIES:  No Known Allergies      MEDICATIONS:  MEDICATIONS  (STANDING):  aspirin enteric coated 81 milliGRAM(s) Oral daily  atorvastatin 20 milliGRAM(s) Oral at bedtime  dextrose 5%. 1000 milliLiter(s) (50 mL/Hr) IV Continuous <Continuous>  dextrose 5%. 1000 milliLiter(s) (100 mL/Hr) IV Continuous <Continuous>  dextrose 50% Injectable 25 Gram(s) IV Push once  dextrose 50% Injectable 12.5 Gram(s) IV Push once  dextrose 50% Injectable 25 Gram(s) IV Push once  gabapentin 100 milliGRAM(s) Oral daily  glucagon  Injectable 1 milliGRAM(s) IntraMuscular once  heparin   Injectable 5000 Unit(s) SubCutaneous every 12 hours  insulin lispro (ADMELOG) corrective regimen sliding scale   SubCutaneous three times a day before meals  pantoprazole    Tablet 40 milliGRAM(s) Oral before breakfast  piperacillin/tazobactam IVPB.. 3.375 Gram(s) IV Intermittent every 8 hours  sodium chloride 0.9%. 1000 milliLiter(s) (75 mL/Hr) IV Continuous <Continuous>    MEDICATIONS  (PRN):  acetaminophen     Tablet .. 650 milliGRAM(s) Oral every 6 hours PRN Temp greater or equal to 38C (100.4F), Mild Pain (1 - 3)  aluminum hydroxide/magnesium hydroxide/simethicone Suspension 30 milliLiter(s) Oral every 4 hours PRN Dyspepsia  dextrose Oral Gel 15 Gram(s) Oral once PRN Blood Glucose LESS THAN 70 milliGRAM(s)/deciliter  melatonin 5 milliGRAM(s) Oral at bedtime PRN Insomnia  ondansetron Injectable 4 milliGRAM(s) IV Push every 8 hours PRN Nausea and/or Vomiting      HOME MEDICATIONS:  Home Medications:  aspirin 81 mg oral delayed release capsule: orally (28 Aug 2023 22:27)  atorvastatin 20 mg oral tablet: 1 orally (28 Aug 2023 22:27)  gabapentin 100 mg oral capsule: 1 orally (28 Aug 2023 22:27)  meclizine 12.5 mg oral tablet: 1 orally (28 Aug 2023 22:27)  metFORMIN 500 mg oral tablet: 1 orally (28 Aug 2023 22:27)  omeprazole 10 mg oral powder for reconstitution, delayed release: 1 orally (28 Aug 2023 22:27)  pioglitazone 30 mg oral tablet: 1 orally (28 Aug 2023 22:27)      VITALS:   T(F): 97.8 (08-30 @ 05:01), Max: 101.2 (08-28 @ 09:50)  HR: 51 (08-30 @ 05:01) (51 - 83)  BP: 153/69 (08-30 @ 05:01) (119/63 - 183/83)  BP(mean): --  RR: 18 (08-30 @ 05:01) (18 - 22)  SpO2: 97% (08-30 @ 05:01) (95% - 97%)          REVIEW OF SYSTEMS:  See HPI      PHYSICAL EXAM:  NEURO: patient is awake , alert and oriented  GEN: Not in acute distress  NECK: no thyroid enlargement, no JVD  LUNGS: Clear to auscultation bilaterally   CARDIOVASCULAR: S1/S2 present, RRR , no murmurs or rubs, no carotid bruits,  + PP bilaterally  ABD: Soft, non-tender, non-distended, +BS  EXT: No FALLON  SKIN: Intact        LABS:                        13.1   11.06 )-----------( 187      ( 29 Aug 2023 06:31 )             39.0     08-29    134<L>  |  98  |  14  ----------------------------<  92  4.1   |  26  |  0.8    Ca    9.7      29 Aug 2023 06:31  Phos  2.2     08-29  Mg     1.4     08-29    TPro  6.5  /  Alb  3.9  /  TBili  0.5  /  DBili  x   /  AST  27  /  ALT  28  /  AlkPhos  27<L>  08-29      Troponin T, Serum: <0.01 ng/mL (08-29-23 @ 21:46)  Troponin T, Serum: <0.01 ng/mL (08-29-23 @ 15:43)    CARDIAC MARKERS ( 29 Aug 2023 21:46 )  x     / <0.01 ng/mL / x     / x     / x      CARDIAC MARKERS ( 29 Aug 2023 15:43 )  x     / <0.01 ng/mL / x     / x     / x      CARDIAC MARKERS ( 29 Aug 2023 06:31 )  x     / <0.01 ng/mL / x     / x     / x      CARDIAC MARKERS ( 28 Aug 2023 10:30 )  x     / <0.01 ng/mL / x     / x     / x              RADIOLOGY:  -CXR:  < from: Xray Chest 1 View-PORTABLE IMMEDIATE (Xray Chest 1 View-PORTABLE IMMEDIATE .) (08.28.23 @ 10:25) >  Impression:    No radiographic evidence of acute cardiopulmonary disease.        --- End of Report ---    < end of copied text >            < from: CT Abdomen and Pelvis w/ IV Cont (08.28.23 @ 13:16) >  IMPRESSION:    Cholelithiasis with mild gallbladder wall thickening. Findings are   indeterminate. Recommend abdominal ultrasound for further evaluation.    Essentially stable splenomegaly.      --- End of Report ---    < end of copied text >    ECG:  < from: 12 Lead ECG (08.29.23 @ 16:42) >  Normal sinus rhythm  Left axis deviation  Right bundle branch block  Abnormal ECG    Confirmed by Sarwat Renee ([id number]) on 8/29/2023 4:58:27 PM    < end of copied text >

## 2023-08-30 NOTE — PROGRESS NOTE ADULT - ASSESSMENT
71 y/o male with medical history of Type 2 diabetes, High cholesterol, GERD, Hx of Pancreatitis, Everyday heavy smoker (2-3 PPD), and peripheral neuropathy who presents with his wife to the ER with c/o chest pain, epigastric pain and fever to 102 at home and constipation x 4 days. Patient evaluated at bedside this morning AAOX3, NAD with plan for robotic cholecystectomy but midday patient developed new onset afib w RVR that has not converted and now being started on amiodarone. Case will be rescheduled when patient is clinically optimized for intervention.     PLAN:  -care as per primary team  -afib management  -cards following  -IV ABX  -pain control  -surgery cancelled for today  -may restart diet as tolerated    spectra [phone]

## 2023-08-30 NOTE — CHART NOTE - NSCHARTNOTEFT_GEN_A_CORE
-Called by primary team that Pt with tachyarrhythmia on tele. Pt evaluated at bedside and Pt endorses palpitations, but denies CP, or SOB  -On tele appears in AFib w/ RVR with -160's sustained  -ECG: Afib w/ RVR, Incomplete RBBB    Plan:  -Start Amio loading dose followed by maintenance dose, to try to covert to NSR  -Consider starting Metoprolol 50mg PO BID  -Repeat ECG  -Obtain TTE  -Check TSH  -Monitor lytes  -Will follow      Discussed with Dr Colin and primary team -Called by primary team that Pt with tachyarrhythmia on tele. Pt evaluated at bedside and Pt endorses palpitations, but denies CP, or SOB  -On tele appears in AFib w/ RVR with -160's sustained  -ECG: Afib w/ RVR, Incomplete RBBB    Plan:  -Start Amio loading dose followed by maintenance dose, to try to covert to NSR  -Consider starting Metoprolol 50mg PO BID  -CHADS-VASC: 3 - Consider starting AC with Heparin gtt/Therapeutic Lovenox if no contraindications, then transition to PO AC when stable  -Repeat ECG  -Obtain TTE  -Check TSH  -Monitor lytes  -Will follow      Discussed with Dr Colin and primary team

## 2023-08-30 NOTE — CONSULT NOTE ADULT - NS ATTEND AMEND GEN_ALL_CORE FT
Patient smoking since age 16. He denies angina or chf. He claims he can walk blocks or go up multiple flights steps. No chest pain or sob. He needs GB surgery. He does not want any cardiac tests now. Cardiac risk surgery intermediate. Aware risk higher because of smoking. But he denies symptoms.
Patient with multiple episodes of pancreatitis in p[ast admittted with abdominal pain. Gallstones + Awaiting results of HIDA scan

## 2023-08-30 NOTE — MEDICAL STUDENT PROGRESS NOTE(EDUCATION) - SUBJECTIVE AND OBJECTIVE BOX
Subjective:   72 year old man presented with RUQ pain and biliary colic. Pt was seen today with the surgeon to discuss cholecystectomy. Pt denied any fever, nausea or vomiting.     Objective:     Physical Exam:  General: NAD, resting comfortably  HEENT: NC/AT, EOMI, normal hearing, no oral lesions, no LAD, neck supple  Pulmonary: CTA B/L  Cardiovascular: S1, S2 w/RRR  Abdominal: soft, ND/NT  Extremities:  no clubbing/cyanosis/edema  Neuro: A/O x 3    Vital Signs Last 24 Hrs  T(C): 37.7 (30 Aug 2023 14:27), Max: 37.7 (30 Aug 2023 14:27)  T(F): 99.8 (30 Aug 2023 14:27), Max: 99.8 (30 Aug 2023 14:27)  HR: 68 (30 Aug 2023 14:27) (51 - 160)  BP: 149/107 (30 Aug 2023 14:27) (121/84 - 183/83)  BP(mean): --  RR: 16 (30 Aug 2023 14:27) (16 - 18)  SpO2: 96% (30 Aug 2023 14:27) (96% - 97%)    Parameters below as of 30 Aug 2023 14:27  Patient On (Oxygen Delivery Method): room air    PAST MEDICAL & SURGICAL HISTORY:  High cholesterol  Elevated cholesterol with high triglycerides  Pancreatitis  Acid reflux  BP (high blood pressure)  No significant past surgical history    MEDICATIONS  (STANDING):  aMIOdarone Infusion 0.5 mG/Min (16.7 mL/Hr) IV Continuous <Continuous>  aMIOdarone Infusion 1 mG/Min (33.3 mL/Hr) IV Continuous <Continuous>  aspirin enteric coated 81 milliGRAM(s) Oral daily  atorvastatin 20 milliGRAM(s) Oral at bedtime  dextrose 5%. 1000 milliLiter(s) (50 mL/Hr) IV Continuous <Continuous>  dextrose 5%. 1000 milliLiter(s) (100 mL/Hr) IV Continuous <Continuous>  dextrose 50% Injectable 25 Gram(s) IV Push once  dextrose 50% Injectable 12.5 Gram(s) IV Push once  dextrose 50% Injectable 25 Gram(s) IV Push once  gabapentin 100 milliGRAM(s) Oral daily  glucagon  Injectable 1 milliGRAM(s) IntraMuscular once  heparin   Injectable 5000 Unit(s) SubCutaneous every 12 hours  insulin lispro (ADMELOG) corrective regimen sliding scale   SubCutaneous three times a day before meals  pantoprazole    Tablet 40 milliGRAM(s) Oral before breakfast  piperacillin/tazobactam IVPB.. 3.375 Gram(s) IV Intermittent every 8 hours  sodium chloride 0.9%. 1000 milliLiter(s) (75 mL/Hr) IV Continuous <Continuous>    MEDICATIONS  (PRN):  acetaminophen     Tablet .. 650 milliGRAM(s) Oral every 6 hours PRN Temp greater or equal to 38C (100.4F), Mild Pain (1 - 3)  aluminum hydroxide/magnesium hydroxide/simethicone Suspension 30 milliLiter(s) Oral every 4 hours PRN Dyspepsia  dextrose Oral Gel 15 Gram(s) Oral once PRN Blood Glucose LESS THAN 70 milliGRAM(s)/deciliter  melatonin 5 milliGRAM(s) Oral at bedtime PRN Insomnia  ondansetron Injectable 4 milliGRAM(s) IV Push every 8 hours PRN Nausea and/or Vomiting      Allergies  No Known Allergies  Intolerances    I&O's Summary    LABS:                        13.1   11.06 )-----------( 187      ( 29 Aug 2023 06:31 )             39.0     08-29    134<L>  |  98  |  14  ----------------------------<  92  4.1   |  26  |  0.8    Ca    9.7      29 Aug 2023 06:31  Phos  2.2     08-29  Mg     1.4     08-29    TPro  6.5  /  Alb  3.9  /  TBili  0.5  /  DBili  x   /  AST  27  /  ALT  28  /  AlkPhos  27<L>  08-29    PT/INR - ( 30 Aug 2023 12:30 )   PT: 14.40 sec;   INR: 1.25 ratio      Urinalysis Basic - ( 29 Aug 2023 06:31 )    Color: x / Appearance: x / SG: x / pH: x  Gluc: 92 mg/dL / Ketone: x  / Bili: x / Urobili: x   Blood: x / Protein: x / Nitrite: x   Leuk Esterase: x / RBC: x / WBC x   Sq Epi: x / Non Sq Epi: x / Bacteria: x    CAPILLARY BLOOD GLUCOSE    POCT Blood Glucose.: 109 mg/dL (30 Aug 2023 11:40)  POCT Blood Glucose.: 121 mg/dL (30 Aug 2023 07:39)  POCT Blood Glucose.: 94 mg/dL (29 Aug 2023 16:51)    LIVER FUNCTIONS - ( 29 Aug 2023 06:31 )  Alb: 3.9 g/dL / Pro: 6.5 g/dL / ALK PHOS: 27 U/L / ALT: 28 U/L / AST: 27 U/L / GGT: x             Cultures:      RADIOLOGY & ADDITIONAL STUDIES:  < from: PACS Image ([facility]) (08.29.23 @ 15:17) >      < end of copied text >

## 2023-08-30 NOTE — MEDICAL STUDENT PROGRESS NOTE(EDUCATION) - ASSESSMENT
71 y/o male with medical history of Type 2 diabetes, High cholesterol, GERD, Hx of Pancreatitis, Everyday heavy smoker (2-3 PPD), and peripheral neuropathy who presents to the ER with chest pain, epigastric pain and fever to 102 at home and constipation x 4 days. Patient evaluated at bedside this morning with attending, with plan for robotic cholecystectomy but midday patient developed new onset afib that has not converted and now being started on amiodarone. Case will be rescheduled when patient is clinically ready to handle the procedure.    PLAN:  -surgery cancelled for today  -afib management  -IV ABX  -pain control  -may restart diet as tolerated    #DM2  -Hold oral medications, ISS  -Monitor POC   FS AC/HS   insulin scale    #HLD/neuropathy/GERD  -continue with home medications

## 2023-08-30 NOTE — CONSULT NOTE ADULT - ASSESSMENT
#This consult serves only as a perioperative cardiac risk stratification and evaluation to predict 30-day cardiac complications risk and mortality.  The decision to proceed with the surgery/procedure is made by the performing physician and the patient.        #Pt denies any CP/SOB at rest or exertion   #Trop flat x4   #ECG: NS, RBBB, non-ischemic   #Given risk factors, recommended Pt do inpatient pharmacological thallium stress test., but is refusing. Pt is aware of risks      - Currently no active cardiac conditions. No signs of ischemia, or ADHF  - Able to walk >4 METS without any anginal symptoms   - At intermediate-high risk for perioperative major adverse cardiac events  - Outpatient ischemic workup. F/u w/ cardiology  - No further cardiac workup is indicated at this time. Further cardiac workup will depend on clinical course  - All other workup per primary team      Discussed with Dr Ray

## 2023-08-30 NOTE — PROGRESS NOTE ADULT - SUBJECTIVE AND OBJECTIVE BOX
ARIELLE [id number]    Subjective/Interval History       ROS    PHYSICAL EXAM  Vital Signs Last 24 Hrs  T(C): 36.3 (30 Aug 2023 11:44), Max: 36.6 (30 Aug 2023 05:01)  T(F): 97.4 (30 Aug 2023 11:44), Max: 97.8 (30 Aug 2023 05:01)  HR: 51 (30 Aug 2023 11:44) (51 - 83)  BP: 177/81 (30 Aug 2023 11:44) (153/69 - 183/83)  BP(mean): --  RR: 16 (30 Aug 2023 11:44) (16 - 18)  SpO2: 96% (30 Aug 2023 11:44) (96% - 97%)    Parameters below as of 29 Aug 2023 15:47  Patient On (Oxygen Delivery Method): room air      GA : AAOX3, NAD   HEENT: PERRLA, EOMI  NECK: no JVD, no thyromegaly   CVS: S1 S2 no murmur no rubs no gallop  RESP: CTAB no wheeze, no rhonchi no rales  ABD: Soft, NT, ND, tympanic, no rebound or guarding   : No Eckert, No CVA tenderness   EXT; no pedal edema, no cyanosis  MSK: No ML spinal tenderness, normal ROM   NEURO: AAOX3, no new focal deficits     LABS/ IMAGING                        13.1   11.06 )-----------( 187      ( 29 Aug 2023 06:31 )             39.0     CARDIAC MARKERS ( 29 Aug 2023 21:46 )  x     / <0.01 ng/mL / x     / x     / x      CARDIAC MARKERS ( 29 Aug 2023 15:43 )  x     / <0.01 ng/mL / x     / x     / x      CARDIAC MARKERS ( 29 Aug 2023 06:31 )  x     / <0.01 ng/mL / x     / x     / x          08-29    134<L>  |  98  |  14  ----------------------------<  92  4.1   |  26  |  0.8    Ca    9.7      29 Aug 2023 06:31  Phos  2.2     08-29  Mg     1.4     08-29    TPro  6.5  /  Alb  3.9  /  TBili  0.5  /  DBili  x   /  AST  27  /  ALT  28  /  AlkPhos  27<L>  08-29    CAPILLARY BLOOD GLUCOSE      POCT Blood Glucose.: 109 mg/dL (30 Aug 2023 11:40)  POCT Blood Glucose.: 121 mg/dL (30 Aug 2023 07:39)  POCT Blood Glucose.: 94 mg/dL (29 Aug 2023 16:51)    Urinalysis Basic - ( 29 Aug 2023 06:31 )    Color: x / Appearance: x / SG: x / pH: x  Gluc: 92 mg/dL / Ketone: x  / Bili: x / Urobili: x   Blood: x / Protein: x / Nitrite: x   Leuk Esterase: x / RBC: x / WBC x   Sq Epi: x / Non Sq Epi: x / Bacteria: x      LIVER FUNCTIONS - ( 29 Aug 2023 06:31 )  Alb: 3.9 g/dL / Pro: 6.5 g/dL / ALK PHOS: 27 U/L / ALT: 28 U/L / AST: 27 U/L / GGT: x             Culture - Blood (collected 28 Aug 2023 10:30)  Source: .Blood Blood  Preliminary Report (29 Aug 2023 23:01):    No growth at 24 hours    Culture - Blood (collected 28 Aug 2023 10:30)  Source: .Blood Blood  Preliminary Report (29 Aug 2023 23:01):    No growth at 24 hours    Culture - Urine (collected 28 Aug 2023 10:03)  Source: Clean Catch Clean Catch (Midstream)  Final Report (29 Aug 2023 22:44):    <10,000 CFU/mL Normal Urogenital Lynn           ARIELLE Gould    Subjective/Interval History   -denies CP, SOB or abd pain   - HR elevated     ROS  - denies palpitations.     PHYSICAL EXAM  Vital Signs Last 24 Hrs  T(C): 36.3 (30 Aug 2023 11:44), Max: 36.6 (30 Aug 2023 05:01)  T(F): 97.4 (30 Aug 2023 11:44), Max: 97.8 (30 Aug 2023 05:01)  HR: 51 (30 Aug 2023 11:44) (51 - 83)  BP: 177/81 (30 Aug 2023 11:44) (153/69 - 183/83)  BP(mean): --  RR: 16 (30 Aug 2023 11:44) (16 - 18)  SpO2: 96% (30 Aug 2023 11:44) (96% - 97%)    Parameters below as of 29 Aug 2023 15:47  Patient On (Oxygen Delivery Method): room air      GA : AAOX3, NAD   HEENT: PERRLA, EOMI  NECK: no JVD, no thyromegaly   CVS: S1 S2 no murmur no rubs no gallop, irregularly irregular tachycardic   RESP: CTAB no wheeze, no rhonchi no rales  ABD: Soft, NT, ND, tympanic, no rebound or guarding    EXT; no pedal edema,   NEURO: AAOX3, no new focal deficits     LABS/ IMAGING                        13.1   11.06 )-----------( 187      ( 29 Aug 2023 06:31 )             39.0     CARDIAC MARKERS ( 29 Aug 2023 21:46 )  x     / <0.01 ng/mL / x     / x     / x      CARDIAC MARKERS ( 29 Aug 2023 15:43 )  x     / <0.01 ng/mL / x     / x     / x      CARDIAC MARKERS ( 29 Aug 2023 06:31 )  x     / <0.01 ng/mL / x     / x     / x          08-29    134<L>  |  98  |  14  ----------------------------<  92  4.1   |  26  |  0.8    Ca    9.7      29 Aug 2023 06:31  Phos  2.2     08-29  Mg     1.4     08-29    TPro  6.5  /  Alb  3.9  /  TBili  0.5  /  DBili  x   /  AST  27  /  ALT  28  /  AlkPhos  27<L>  08-29    CAPILLARY BLOOD GLUCOSE      POCT Blood Glucose.: 109 mg/dL (30 Aug 2023 11:40)  POCT Blood Glucose.: 121 mg/dL (30 Aug 2023 07:39)  POCT Blood Glucose.: 94 mg/dL (29 Aug 2023 16:51)    Urinalysis Basic - ( 29 Aug 2023 06:31 )    Color: x / Appearance: x / SG: x / pH: x  Gluc: 92 mg/dL / Ketone: x  / Bili: x / Urobili: x   Blood: x / Protein: x / Nitrite: x   Leuk Esterase: x / RBC: x / WBC x   Sq Epi: x / Non Sq Epi: x / Bacteria: x      LIVER FUNCTIONS - ( 29 Aug 2023 06:31 )  Alb: 3.9 g/dL / Pro: 6.5 g/dL / ALK PHOS: 27 U/L / ALT: 28 U/L / AST: 27 U/L / GGT: x             Culture - Blood (collected 28 Aug 2023 10:30)  Source: .Blood Blood  Preliminary Report (29 Aug 2023 23:01):    No growth at 24 hours    Culture - Blood (collected 28 Aug 2023 10:30)  Source: .Blood Blood  Preliminary Report (29 Aug 2023 23:01):    No growth at 24 hours    Culture - Urine (collected 28 Aug 2023 10:03)  Source: Clean Catch Clean Catch (Midstream)  Final Report (29 Aug 2023 22:44):    <10,000 CFU/mL Normal Urogenital Lynn

## 2023-08-31 ENCOUNTER — TRANSCRIPTION ENCOUNTER (OUTPATIENT)
Age: 73
End: 2023-08-31

## 2023-08-31 VITALS
TEMPERATURE: 100 F | DIASTOLIC BLOOD PRESSURE: 79 MMHG | OXYGEN SATURATION: 95 % | RESPIRATION RATE: 18 BRPM | SYSTOLIC BLOOD PRESSURE: 166 MMHG | HEART RATE: 64 BPM

## 2023-08-31 LAB
ALBUMIN SERPL ELPH-MCNC: 3.6 G/DL — SIGNIFICANT CHANGE UP (ref 3.5–5.2)
ALP SERPL-CCNC: 25 U/L — LOW (ref 30–115)
ALT FLD-CCNC: 46 U/L — HIGH (ref 0–41)
ANION GAP SERPL CALC-SCNC: 12 MMOL/L — SIGNIFICANT CHANGE UP (ref 7–14)
AST SERPL-CCNC: 35 U/L — SIGNIFICANT CHANGE UP (ref 0–41)
BASOPHILS # BLD AUTO: 0.03 K/UL — SIGNIFICANT CHANGE UP (ref 0–0.2)
BASOPHILS NFR BLD AUTO: 0.3 % — SIGNIFICANT CHANGE UP (ref 0–1)
BILIRUB SERPL-MCNC: 0.6 MG/DL — SIGNIFICANT CHANGE UP (ref 0.2–1.2)
BUN SERPL-MCNC: 14 MG/DL — SIGNIFICANT CHANGE UP (ref 10–20)
CALCIUM SERPL-MCNC: 9.4 MG/DL — SIGNIFICANT CHANGE UP (ref 8.4–10.5)
CHLORIDE SERPL-SCNC: 100 MMOL/L — SIGNIFICANT CHANGE UP (ref 98–110)
CO2 SERPL-SCNC: 26 MMOL/L — SIGNIFICANT CHANGE UP (ref 17–32)
CREAT SERPL-MCNC: 0.6 MG/DL — LOW (ref 0.7–1.5)
EGFR: 102 ML/MIN/1.73M2 — SIGNIFICANT CHANGE UP
EOSINOPHIL # BLD AUTO: 0.15 K/UL — SIGNIFICANT CHANGE UP (ref 0–0.7)
EOSINOPHIL NFR BLD AUTO: 1.7 % — SIGNIFICANT CHANGE UP (ref 0–8)
GLUCOSE BLDC GLUCOMTR-MCNC: 102 MG/DL — HIGH (ref 70–99)
GLUCOSE BLDC GLUCOMTR-MCNC: 93 MG/DL — SIGNIFICANT CHANGE UP (ref 70–99)
GLUCOSE SERPL-MCNC: 96 MG/DL — SIGNIFICANT CHANGE UP (ref 70–99)
HCT VFR BLD CALC: 38.9 % — LOW (ref 42–52)
HGB BLD-MCNC: 13.2 G/DL — LOW (ref 14–18)
IMM GRANULOCYTES NFR BLD AUTO: 0.6 % — HIGH (ref 0.1–0.3)
LYMPHOCYTES # BLD AUTO: 2.34 K/UL — SIGNIFICANT CHANGE UP (ref 1.2–3.4)
LYMPHOCYTES # BLD AUTO: 27 % — SIGNIFICANT CHANGE UP (ref 20.5–51.1)
MCHC RBC-ENTMCNC: 28.8 PG — SIGNIFICANT CHANGE UP (ref 27–31)
MCHC RBC-ENTMCNC: 33.9 G/DL — SIGNIFICANT CHANGE UP (ref 32–37)
MCV RBC AUTO: 84.9 FL — SIGNIFICANT CHANGE UP (ref 80–94)
MONOCYTES # BLD AUTO: 1.37 K/UL — HIGH (ref 0.1–0.6)
MONOCYTES NFR BLD AUTO: 15.8 % — HIGH (ref 1.7–9.3)
NEUTROPHILS # BLD AUTO: 4.73 K/UL — SIGNIFICANT CHANGE UP (ref 1.4–6.5)
NEUTROPHILS NFR BLD AUTO: 54.6 % — SIGNIFICANT CHANGE UP (ref 42.2–75.2)
NRBC # BLD: 0 /100 WBCS — SIGNIFICANT CHANGE UP (ref 0–0)
PLATELET # BLD AUTO: 216 K/UL — SIGNIFICANT CHANGE UP (ref 130–400)
PMV BLD: 11.2 FL — HIGH (ref 7.4–10.4)
POTASSIUM SERPL-MCNC: 3.9 MMOL/L — SIGNIFICANT CHANGE UP (ref 3.5–5)
POTASSIUM SERPL-SCNC: 3.9 MMOL/L — SIGNIFICANT CHANGE UP (ref 3.5–5)
PROT SERPL-MCNC: 6.3 G/DL — SIGNIFICANT CHANGE UP (ref 6–8)
RBC # BLD: 4.58 M/UL — LOW (ref 4.7–6.1)
RBC # FLD: 12.7 % — SIGNIFICANT CHANGE UP (ref 11.5–14.5)
SODIUM SERPL-SCNC: 138 MMOL/L — SIGNIFICANT CHANGE UP (ref 135–146)
TSH SERPL-MCNC: 2.77 UIU/ML — SIGNIFICANT CHANGE UP (ref 0.27–4.2)
WBC # BLD: 8.67 K/UL — SIGNIFICANT CHANGE UP (ref 4.8–10.8)
WBC # FLD AUTO: 8.67 K/UL — SIGNIFICANT CHANGE UP (ref 4.8–10.8)

## 2023-08-31 PROCEDURE — 99222 1ST HOSP IP/OBS MODERATE 55: CPT

## 2023-08-31 PROCEDURE — 99239 HOSP IP/OBS DSCHRG MGMT >30: CPT

## 2023-08-31 RX ORDER — METOPROLOL TARTRATE 50 MG
1 TABLET ORAL
Qty: 30 | Refills: 0
Start: 2023-08-31 | End: 2023-09-29

## 2023-08-31 RX ORDER — APIXABAN 2.5 MG/1
1 TABLET, FILM COATED ORAL
Qty: 60 | Refills: 0
Start: 2023-08-31 | End: 2023-09-29

## 2023-08-31 RX ADMIN — Medication 81 MILLIGRAM(S): at 12:48

## 2023-08-31 RX ADMIN — PIPERACILLIN AND TAZOBACTAM 25 GRAM(S): 4; .5 INJECTION, POWDER, LYOPHILIZED, FOR SOLUTION INTRAVENOUS at 05:32

## 2023-08-31 RX ADMIN — PANTOPRAZOLE SODIUM 40 MILLIGRAM(S): 20 TABLET, DELAYED RELEASE ORAL at 05:32

## 2023-08-31 RX ADMIN — PIPERACILLIN AND TAZOBACTAM 25 GRAM(S): 4; .5 INJECTION, POWDER, LYOPHILIZED, FOR SOLUTION INTRAVENOUS at 13:12

## 2023-08-31 RX ADMIN — ENOXAPARIN SODIUM 90 MILLIGRAM(S): 100 INJECTION SUBCUTANEOUS at 05:32

## 2023-08-31 RX ADMIN — GABAPENTIN 100 MILLIGRAM(S): 400 CAPSULE ORAL at 12:48

## 2023-08-31 NOTE — PROGRESS NOTE ADULT - SUBJECTIVE AND OBJECTIVE BOX
Subjective/Objective:     - Pt resting comfortably in bed this am offers no acute complaints, denies chest pain, sob, palpitation, dizziness    MEDICATIONS  (STANDING):  aMIOdarone Infusion 1 mG/Min (33.3 mL/Hr) IV Continuous <Continuous>  aspirin enteric coated 81 milliGRAM(s) Oral daily  atorvastatin 20 milliGRAM(s) Oral at bedtime  dextrose 5%. 1000 milliLiter(s) (100 mL/Hr) IV Continuous <Continuous>  dextrose 5%. 1000 milliLiter(s) (50 mL/Hr) IV Continuous <Continuous>  dextrose 50% Injectable 25 Gram(s) IV Push once  dextrose 50% Injectable 25 Gram(s) IV Push once  dextrose 50% Injectable 12.5 Gram(s) IV Push once  enoxaparin Injectable 90 milliGRAM(s) SubCutaneous every 12 hours  gabapentin 100 milliGRAM(s) Oral daily  glucagon  Injectable 1 milliGRAM(s) IntraMuscular once  insulin lispro (ADMELOG) corrective regimen sliding scale   SubCutaneous three times a day before meals  pantoprazole    Tablet 40 milliGRAM(s) Oral before breakfast  piperacillin/tazobactam IVPB.. 3.375 Gram(s) IV Intermittent every 8 hours  sodium chloride 0.9%. 1000 milliLiter(s) (75 mL/Hr) IV Continuous <Continuous>    MEDICATIONS  (PRN):  acetaminophen     Tablet .. 650 milliGRAM(s) Oral every 6 hours PRN Temp greater or equal to 38C (100.4F), Mild Pain (1 - 3)  aluminum hydroxide/magnesium hydroxide/simethicone Suspension 30 milliLiter(s) Oral every 4 hours PRN Dyspepsia  dextrose Oral Gel 15 Gram(s) Oral once PRN Blood Glucose LESS THAN 70 milliGRAM(s)/deciliter  melatonin 5 milliGRAM(s) Oral at bedtime PRN Insomnia  ondansetron Injectable 4 milliGRAM(s) IV Push every 8 hours PRN Nausea and/or Vomiting          Vital Signs Last 24 Hrs  T(C): 36.6 (31 Aug 2023 04:39), Max: 37.7 (30 Aug 2023 14:27)  T(F): 97.9 (31 Aug 2023 04:39), Max: 99.8 (30 Aug 2023 14:27)  HR: 57 (31 Aug 2023 04:39) (49 - 160)  BP: 124/64 (31 Aug 2023 04:39) (121/84 - 177/81)  BP(mean): --  RR: 18 (31 Aug 2023 04:39) (16 - 18)  SpO2: 97% (31 Aug 2023 04:39) (96% - 100%)      I&O's Detail    30 Aug 2023 07:01  -  31 Aug 2023 07:00  --------------------------------------------------------  IN:    Amiodarone: 199.8 mL    IV PiggyBack: 100 mL  Total IN: 299.8 mL    OUT:    Voided (mL): 1100 mL  Total OUT: 1100 mL    Total NET: -800.2 mL        GENERAL:  71y/o Male NAD, resting comfortably.  HEAD:  Atraumatic, Normocephalic  EYES: EOMI, PERRLA, conjunctiva and sclera clear  NECK: Supple, No JVD, no cervical lymphadenopathy, non-tender  CHEST/LUNG: Clear to auscultation bilaterally; No wheeze, rhonchi, or rales  HEART: Regular rate and rhythm; S1&S2  ABDOMEN: Soft, Nontender, Nondistended x 4 quadrants; Bowel sounds present  EXTREMITIES:   Peripheral Pulses Present, No clubbing, no cyanosis, or no edema, no calf tenderness  PSYCH: AAOx3, cooperative, appropriate  NEUROLOGY: WNL  SKIN: WNL        EKG/ TELEM:    LABS:                          13.2   8.67  )-----------( 216      ( 31 Aug 2023 06:25 )             38.9     PT/INR - ( 30 Aug 2023 12:30 )   PT: 14.40 sec;   INR: 1.25 ratio           31 Aug 2023 06:25    138    |  100    |  14     ----------------------------<  96     3.9     |  26     |  0.6<L>    Ca    9.4        31 Aug 2023 06:25    TPro  6.3    /  Alb  3.6    /  TBili  0.6    /  DBili  x      /  AST  35     /  ALT  46<H>  /  AlkPhos  25<L>  31 Aug 2023 06:25    CARDIAC MARKERS ( 29 Aug 2023 21:46 )  x     / <0.01 ng/mL / x     / x     / x      CARDIAC MARKERS ( 29 Aug 2023 15:43 )  x     / <0.01 ng/mL / x     / x     / x                        Diagnostic testing:  TTE  < from: TTE Echo Complete w/o Contrast w/ Doppler (08.30.23 @ 14:51) >  Summary:   1. Left ventricular ejection fraction, byvisual estimation, is 55 to   60%.   2. Mild left ventricular hypertrophy.   3. Normal left atrial size.   4. There is no evidence of pericardial effusion.   5. Mild mitral annular calcification.   6. Mild mitral valve regurgitation.   7. Mild tricuspid regurgitation.   8. Sclerotic aortic valve with normal opening.    PHYSICIAN INTERPRETATION:  Left Ventricle: The left ventricular internal cavity size is normal.   There is mild left ventricular hypertrophy. Left ventricular ejection   fraction, by visual estimation, is 55 to 60%. Normal segmental left   ventricular systolic function.  Left Atrium: Normal left atrial size.  Pericardium: There is no evidence of pericardial effusion.  Mitral Valve: There is mild mitral annular calcification. Mild mitral   valve regurgitation is seen.  Tricuspid Valve: Mild tricuspid regurgitation is visualized.  Aortic Valve: Sclerotic aortic valve with normal opening.    < end of copied text >    ECG  < from: 12 Lead ECG (08.30.23 @ 13:05) >  Ventricular Rate 149 BPM    Atrial Rate 136 BPM    QRS Duration 104 ms    Q-T Interval 310 ms    QTC Calculation(Bazett) 488 ms    R Axis -57 degrees    T Axis -16 degrees    Diagnosis Line Atrial fibrillation with rapid ventricular response  Left axis deviation  Incomplete right bundle branch block  Minimal voltage criteria for LVH, may be normal variant  Anterior infarct , age undetermined  Abnormal ECG    Confirmed by DANIEL ABBASI MD ([id number]) on 8/30/2023 1:42:20 PM    < end of copied text >        Assessment and Plan:  < from: TTE Echo Complete w/o Contrast w/ Doppler (08.30.23 @ 14:51) >      Summary:   1. Left ventricular ejection fraction, byvisual estimation, is 55 to   60%.   2. Mild left ventricular hypertrophy.   3. Normal left atrial size.   4. There is no evidence of pericardial effusion.   5. Mild mitral annular calcification.   6. Mild mitral valve regurgitation.   7. Mild tricuspid regurgitation.   8. Sclerotic aortic valve with normal opening.    PHYSICIAN INTERPRETATION:  Left Ventricle: The left ventricular internal cavity size is normal.   There is mild left ventricular hypertrophy. Left ventricular ejection   fraction, by visual estimation, is 55 to 60%. Normal segmental left   ventricular systolic function.  Left Atrium: Normal left atrial size.  Pericardium: There is no evidence of pericardial effusion.  Mitral Valve: There is mild mitral annular calcification. Mild mitral   valve regurgitation is seen.  Tricuspid Valve: Mild tricuspid regurgitation is visualized.  Aortic Valve: Sclerotic aortic valve with normal opening.    < end of copied text >

## 2023-08-31 NOTE — PROGRESS NOTE ADULT - REASON FOR ADMISSION
right upper quadrant pain

## 2023-08-31 NOTE — DISCHARGE NOTE PROVIDER - HOSPITAL COURSE
HPI:  Patient is 71 y/o male with past medical  history of Type 2 diabetes, High cholesterol, GERD, Hx of Pancreatitis, Everyday heavy smoker (2-3 PPD), and peripheral neuropathy who presents with his wife to the ER with c/o now resolved chest pain, epigastric pain and fever to 102 at home and constipation x 4 days.   Patient reports the chest pain and epigastric pain started 3-4 days ago and now has been resolved for over 24 hours but he developed a fever to 102 at home so his wife brought him to the ER for evaluation.  Patient reports a history of Pancreatitis x 2 but was told they could not find a cause.  Patient denies being told he had gallstones in the past,  patient denies alcohol use.  He reports Meclizine may had caused one episode of pancreatitis.  Patient reports the pain has now resolved > 24 hours but was located in epigastric area with radiation towards mid chest.  Pain was characterized as sharp in nature and was 8/10.      Patient was admitted for acute gallstone cholecystitis with chest pain to telemetry:     Acute cholecystitis due to biliary calculus   -Abd CT scan shows Cholelithiasis with mild gallbladder wall thickening. Findings are indeterminate. Recommend abdominal ultrasound for further evaluation.  Essentially stable splenomegaly.  -abdm US shows Cholelithiasis and sludge without sonographic evidence of acute cholecystitis.  Hepatic steatosis  - HIDA scan c/w cholecystitis.   -RVP negative   -give iv antibiotic zosyn and IVF  - patient to continue augmentin for 4 more days.   -GI and surgery consulted   -Consult cardiology for preoperative risk stratification: moderate to high risk patient declined stress test   -due to atrial fibrillation with RVR laproscopic cholecystectomy cancelled.  Patient declined cholecystectomy would like to do surgery outpatient, please follow up with Dr. Will Saucedo.   -  Diet advanced and tolerated.     -pt needs cardiac clearance prior to surgery.  Many years since last stress test.  d/w patient to f/u with cardiology Dr. Reeves.     Atrial fib with RVR  - RVR resolved.    - EKG findings d/w cardiology, s/p lovenox 1 mg/kg bid, s/p amiodarone drip    - at home continue toprol xl 25 mg one daily, and eliquis 5 mg twice daily, d/w patient risks of bleeding with DOCs.   - [id number] 3   - ECHO EF 55-60% no WMA.     Chest pain possibly r/w cholecystitis   -EKG shows no acute ischemic change  -troponin x 4 -ve   -TTE: EF 55-60% no WMA     DMT2 with neuropathy   - hold home diabetic meds  - SSI  tid ac   - cont gabapentin     HLD/GERD  -lipitor ppi     Patient's overall condition improved.  Despite counseling patient to stay for laproscopic cholecsytectomy, pt decided he would likely for follow up with is primary care Doctor.

## 2023-08-31 NOTE — DISCHARGE NOTE PROVIDER - NSDCMRMEDTOKEN_GEN_ALL_CORE_FT
amoxicillin-clavulanate 875 mg-125 mg oral tablet: 1 tab(s) orally 2 times a day  aspirin 81 mg oral delayed release capsule: orally  atorvastatin 20 mg oral tablet: 1 orally  Eliquis 5 mg oral tablet: 1 tab(s) orally 2 times a day  gabapentin 100 mg oral capsule: 1 orally  meclizine 12.5 mg oral tablet: 1 orally  metFORMIN 500 mg oral tablet: 1 orally  omeprazole 10 mg oral powder for reconstitution, delayed release: 1 orally  pioglitazone 30 mg oral tablet: 1 orally  Toprol-XL 25 mg oral tablet, extended release: 1 tab(s) orally once a day

## 2023-08-31 NOTE — DISCHARGE NOTE NURSING/CASE MANAGEMENT/SOCIAL WORK - PATIENT PORTAL LINK FT
You can access the FollowMyHealth Patient Portal offered by [facility] by registering at the following website: [email or web address]. By joining [employer]’s FollowMyHealth portal, you will also be able to view your health information using other applications (apps) compatible with our system.

## 2023-08-31 NOTE — PROGRESS NOTE ADULT - ASSESSMENT
71 y/o male with medical history of Type 2 diabetes, High cholesterol, GERD, Hx of Pancreatitis, Everyday heavy smoker (2-3 PPD), and peripheral neuropathy who presents with his wife to the ER with c/o chest pain, epigastric pain and fever to 102 at home and constipation x 4 days.     PLAN:  -care as per primary team  -afib management  -cards following  -IV ABX  -pain control  -surgery cancelled until patient is optimized    spectra [phone]

## 2023-08-31 NOTE — DISCHARGE NOTE NURSING/CASE MANAGEMENT/SOCIAL WORK - NSDCPEFALRISK_GEN_ALL_CORE
For information on Fall & Injury Prevention, visit: [email or web address] OR  [email or web address] OR  https://www.cdc.gov/steadi/patient.html

## 2023-08-31 NOTE — DISCHARGE NOTE PROVIDER - PROVIDER TOKENS
PROVIDER:[TOKEN:[[id number]],FOLLOWUP:[1 week]],PROVIDER:[TOKEN:[[id number]],FOLLOWUP:[2 weeks]],PROVIDER:[TOKEN:[[id number]],FOLLOWUP:[1 month]]

## 2023-08-31 NOTE — DISCHARGE NOTE PROVIDER - ATTENDING DISCHARGE PHYSICAL EXAMINATION:
VITALS:   T(C): 36.6 (08-31-23 @ 04:39), Max: 37.7 (08-30-23 @ 14:27)  HR: 57 (08-31-23 @ 04:39) (49 - 160)  BP: 124/64 (08-31-23 @ 04:39) (124/64 - 149/107)  RR: 18 (08-31-23 @ 04:39) (16 - 18)  SpO2: 97% (08-31-23 @ 04:39) (97% - 100%)    GENERAL: AAOx3   HEAD:  Atraumatic, Normocephalic  EYES: EOMI, PERRLA, no pallor   ENT:  normal oropharynx , no thyromegaly   NECK: Supple, No JVD  CHEST/LUNG: Clear to auscultation bilaterally; No rales, rhonchi, wheezing, or rubs.  Symmetric expansion   HEART:  S1 S2 RRR ; No murmurs or rubs   ABDOMEN: Soft, nontender, nondistended, BS wnl   EXTREMITIES:  No  cyanosis, or edema  NERVOUS SYSTEM:  A&Ox3, no focal deficits

## 2023-08-31 NOTE — PROGRESS NOTE ADULT - ATTENDING COMMENTS
I have seen and examined the patient.  I agree with the above assessment plan.  The patient may be discharged safely if he is able to tolerate a diet.  I will be happy to see him back in the office to discuss further.  He should be discharged with an augmentin oral antibiotics course x7d or similar.  He will need cardiac risk stratification preoperatively. Thank you

## 2023-08-31 NOTE — DISCHARGE NOTE NURSING/CASE MANAGEMENT/SOCIAL WORK - NSDCVIVACCINE_GEN_ALL_CORE_FT
[Follow-Up Visit] : a follow-up [FreeTextEntry2] : history of breast cancer No Vaccines Administered.

## 2023-08-31 NOTE — DISCHARGE NOTE PROVIDER - NSDCCPCAREPLAN_GEN_ALL_CORE_FT
PRINCIPAL DISCHARGE DIAGNOSIS  Diagnosis: Acute cholecystitis due to biliary calculus  Assessment and Plan of Treatment: Acute cholecystitis due to biliary calculus   -Abd CT scan shows Cholelithiasis with mild gallbladder wall thickening. Findings are indeterminate. Recommend abdominal ultrasound for further evaluation.  Essentially stable splenomegaly.  -abdm US shows Cholelithiasis and sludge without sonographic evidence of acute cholecystitis.  Hepatic steatosis  - HIDA scan c/w cholecystitis.   -RVP negative   -give iv antibiotic zosyn and IVF  - patient to continue augmentin for 4 more days.   -GI and surgery consulted   -Consult cardiology for preoperative risk stratification: moderate to high risk patient declined stress test   -due to atrial fibrillation with RVR laproscopic cholecystectomy cancelled.  Patient declined cholecystectomy would like to do surgery outpatient, please follow up with Dr. Will Saucedo.   -  Diet advanced and tolerated.     -pt needs cardiac clearance prior to surgery.  Many years since last stress test.  d/w patient to f/u with cardiology Dr. Reeves.         SECONDARY DISCHARGE DIAGNOSES  Diagnosis: Paroxysmal atrial fibrillation with RVR  Assessment and Plan of Treatment: Atrial fib with RVR  - RVR resolved.    - EKG findings d/w cardiology, s/p lovenox 1 mg/kg bid, s/p amiodarone drip    - at home continue toprol xl 25 mg one daily, and eliquis 5 mg twice daily, d/w patient risks of bleeding with DOCs.   - [id number] 3   - ECHO EF 55-60% no WMA.    Diagnosis: Chest pain  Assessment and Plan of Treatment: chest pain resolved   Chest pain possibly r/w cholecystitis   -EKG shows no acute ischemic change  -troponin x 4 -ve   -TTE: EF 55-60% no WMA   - recommend f/u with Cardiology and stress test or other ischemic evaluation.

## 2023-08-31 NOTE — PROGRESS NOTE ADULT - ASSESSMENT
71 y/o m pmh HLD, DM2, heavy smoker (2-3 PPD), GERD, Pancreatitis, and peripheral neuropathy who presents with epigastric pain now resolved, subsequently developed new onset Afib RVR on tele was started on Amiodarone gtt    Trop flat x4  ECG: NS, RBBB, non-ischemic  TTE 08/30/23: normal LVSF EF 55-60. no RWMA    Impression  # pAfib  # Hypomagnesemia    Plan:  - Pt currently resting comfortably offers no acute complaints  - On tele back in NSR periods of bradycardia in 40s overnight, now in 50s awake  - dc Amiodarone gtt  - would ambulate OOB while on tele  - CHADS-VASC: 3 - Would start on NOAC for TE ppx, can hold prior to procedure outpatient  - Check TSH  - replete Mg  - Pt was offered but refused inpatient pharmacological thallium stress test. Pt is aware of risks  - counselled regarding smoking cessation  - outpatient cardiology follow up

## 2023-08-31 NOTE — DISCHARGE NOTE PROVIDER - CARE PROVIDER_API CALL
[place]  [facility] Medicine  [address] [place] [address]  Phone: [phone]  Fax: [phone]  Follow Up Time: 1 week    Corwin Reeves  Interventional Cardiology  [address] [place] [id number]  Phone: [phone]  Fax: [phone]  Follow Up Time: 2 weeks    Will Marcelino  Surgery  [address] [place] [id number]  Phone: [phone]  Fax: [phone]  Follow Up Time: 1 month

## 2023-09-02 LAB
CULTURE RESULTS: SIGNIFICANT CHANGE UP
CULTURE RESULTS: SIGNIFICANT CHANGE UP
SPECIMEN SOURCE: SIGNIFICANT CHANGE UP
SPECIMEN SOURCE: SIGNIFICANT CHANGE UP

## 2023-09-06 DIAGNOSIS — E78.5 HYPERLIPIDEMIA, UNSPECIFIED: ICD-10-CM

## 2023-09-06 DIAGNOSIS — E11.40 TYPE 2 DIABETES MELLITUS WITH DIABETIC NEUROPATHY, UNSPECIFIED: ICD-10-CM

## 2023-09-06 DIAGNOSIS — F17.210 NICOTINE DEPENDENCE, CIGARETTES, UNCOMPLICATED: ICD-10-CM

## 2023-09-06 DIAGNOSIS — E83.42 HYPOMAGNESEMIA: ICD-10-CM

## 2023-09-06 DIAGNOSIS — K76.0 FATTY (CHANGE OF) LIVER, NOT ELSEWHERE CLASSIFIED: ICD-10-CM

## 2023-09-06 DIAGNOSIS — K80.00 CALCULUS OF GALLBLADDER WITH ACUTE CHOLECYSTITIS WITHOUT OBSTRUCTION: ICD-10-CM

## 2023-09-06 DIAGNOSIS — I48.0 PAROXYSMAL ATRIAL FIBRILLATION: ICD-10-CM

## 2023-09-06 DIAGNOSIS — K59.00 CONSTIPATION, UNSPECIFIED: ICD-10-CM

## 2023-09-06 DIAGNOSIS — K21.9 GASTRO-ESOPHAGEAL REFLUX DISEASE WITHOUT ESOPHAGITIS: ICD-10-CM

## 2023-09-06 DIAGNOSIS — E87.20 ACIDOSIS, UNSPECIFIED: ICD-10-CM

## 2023-09-12 ENCOUNTER — INPATIENT (INPATIENT)
Facility: HOSPITAL | Age: 73
LOS: 3 days | Discharge: HOME | DRG: 418 | End: 2023-09-16
Attending: STUDENT IN AN ORGANIZED HEALTH CARE EDUCATION/TRAINING PROGRAM | Admitting: INTERNAL MEDICINE
Payer: MEDICARE

## 2023-09-12 VITALS
DIASTOLIC BLOOD PRESSURE: 78 MMHG | RESPIRATION RATE: 18 BRPM | OXYGEN SATURATION: 99 % | TEMPERATURE: 97 F | WEIGHT: 199.96 LBS | HEIGHT: 72 IN | HEART RATE: 67 BPM | SYSTOLIC BLOOD PRESSURE: 171 MMHG

## 2023-09-12 DIAGNOSIS — K85.90 ACUTE PANCREATITIS WITHOUT NECROSIS OR INFECTION, UNSPECIFIED: ICD-10-CM

## 2023-09-12 PROBLEM — K21.9 GASTRO-ESOPHAGEAL REFLUX DISEASE WITHOUT ESOPHAGITIS: Chronic | Status: ACTIVE | Noted: 2023-08-28

## 2023-09-12 PROBLEM — E78.2 MIXED HYPERLIPIDEMIA: Chronic | Status: ACTIVE | Noted: 2023-08-28

## 2023-09-12 PROBLEM — E78.00 PURE HYPERCHOLESTEROLEMIA, UNSPECIFIED: Chronic | Status: ACTIVE | Noted: 2023-08-28

## 2023-09-12 PROBLEM — I10 ESSENTIAL (PRIMARY) HYPERTENSION: Chronic | Status: ACTIVE | Noted: 2023-08-28

## 2023-09-12 LAB
ALBUMIN SERPL ELPH-MCNC: 4.6 G/DL — SIGNIFICANT CHANGE UP (ref 3.5–5.2)
ALP SERPL-CCNC: 40 U/L — SIGNIFICANT CHANGE UP (ref 30–115)
ALT FLD-CCNC: 34 U/L — SIGNIFICANT CHANGE UP (ref 0–41)
ANION GAP SERPL CALC-SCNC: 12 MMOL/L — SIGNIFICANT CHANGE UP (ref 7–14)
APTT BLD: 35.6 SEC — SIGNIFICANT CHANGE UP (ref 27–39.2)
AST SERPL-CCNC: 24 U/L — SIGNIFICANT CHANGE UP (ref 0–41)
BASOPHILS # BLD AUTO: 0.04 K/UL — SIGNIFICANT CHANGE UP (ref 0–0.2)
BASOPHILS NFR BLD AUTO: 0.2 % — SIGNIFICANT CHANGE UP (ref 0–1)
BILIRUB SERPL-MCNC: 0.8 MG/DL — SIGNIFICANT CHANGE UP (ref 0.2–1.2)
BUN SERPL-MCNC: 13 MG/DL — SIGNIFICANT CHANGE UP (ref 10–20)
CALCIUM SERPL-MCNC: 10.8 MG/DL — HIGH (ref 8.4–10.5)
CHLORIDE SERPL-SCNC: 99 MMOL/L — SIGNIFICANT CHANGE UP (ref 98–110)
CO2 SERPL-SCNC: 28 MMOL/L — SIGNIFICANT CHANGE UP (ref 17–32)
CREAT SERPL-MCNC: 0.7 MG/DL — SIGNIFICANT CHANGE UP (ref 0.7–1.5)
EGFR: 97 ML/MIN/1.73M2 — SIGNIFICANT CHANGE UP
EOSINOPHIL # BLD AUTO: 0.08 K/UL — SIGNIFICANT CHANGE UP (ref 0–0.7)
EOSINOPHIL NFR BLD AUTO: 0.5 % — SIGNIFICANT CHANGE UP (ref 0–8)
GLUCOSE BLDC GLUCOMTR-MCNC: 101 MG/DL — HIGH (ref 70–99)
GLUCOSE BLDC GLUCOMTR-MCNC: 96 MG/DL — SIGNIFICANT CHANGE UP (ref 70–99)
GLUCOSE SERPL-MCNC: 125 MG/DL — HIGH (ref 70–99)
HCT VFR BLD CALC: 42.5 % — SIGNIFICANT CHANGE UP (ref 42–52)
HGB BLD-MCNC: 14 G/DL — SIGNIFICANT CHANGE UP (ref 14–18)
IMM GRANULOCYTES NFR BLD AUTO: 0.4 % — HIGH (ref 0.1–0.3)
INR BLD: 1.24 RATIO — SIGNIFICANT CHANGE UP (ref 0.65–1.3)
LACTATE SERPL-SCNC: 2.7 MMOL/L — HIGH (ref 0.7–2)
LIDOCAIN IGE QN: 957 U/L — HIGH (ref 7–60)
LYMPHOCYTES # BLD AUTO: 13.7 % — LOW (ref 20.5–51.1)
LYMPHOCYTES # BLD AUTO: 2.26 K/UL — SIGNIFICANT CHANGE UP (ref 1.2–3.4)
MCHC RBC-ENTMCNC: 28.4 PG — SIGNIFICANT CHANGE UP (ref 27–31)
MCHC RBC-ENTMCNC: 32.9 G/DL — SIGNIFICANT CHANGE UP (ref 32–37)
MCV RBC AUTO: 86.2 FL — SIGNIFICANT CHANGE UP (ref 80–94)
MONOCYTES # BLD AUTO: 1.32 K/UL — HIGH (ref 0.1–0.6)
MONOCYTES NFR BLD AUTO: 8 % — SIGNIFICANT CHANGE UP (ref 1.7–9.3)
NEUTROPHILS # BLD AUTO: 12.68 K/UL — HIGH (ref 1.4–6.5)
NEUTROPHILS NFR BLD AUTO: 77.2 % — HIGH (ref 42.2–75.2)
NRBC # BLD: 0 /100 WBCS — SIGNIFICANT CHANGE UP (ref 0–0)
PLATELET # BLD AUTO: 299 K/UL — SIGNIFICANT CHANGE UP (ref 130–400)
PMV BLD: 10.8 FL — HIGH (ref 7.4–10.4)
POTASSIUM SERPL-MCNC: 4.3 MMOL/L — SIGNIFICANT CHANGE UP (ref 3.5–5)
POTASSIUM SERPL-SCNC: 4.3 MMOL/L — SIGNIFICANT CHANGE UP (ref 3.5–5)
PROT SERPL-MCNC: 6.9 G/DL — SIGNIFICANT CHANGE UP (ref 6–8)
PROTHROM AB SERPL-ACNC: 14.2 SEC — HIGH (ref 9.95–12.87)
RBC # BLD: 4.93 M/UL — SIGNIFICANT CHANGE UP (ref 4.7–6.1)
RBC # FLD: 13.3 % — SIGNIFICANT CHANGE UP (ref 11.5–14.5)
SODIUM SERPL-SCNC: 139 MMOL/L — SIGNIFICANT CHANGE UP (ref 135–146)
TRIGL SERPL-MCNC: 209 MG/DL — HIGH
TROPONIN T SERPL-MCNC: <0.01 NG/ML — SIGNIFICANT CHANGE UP
WBC # BLD: 16.44 K/UL — HIGH (ref 4.8–10.8)
WBC # FLD AUTO: 16.44 K/UL — HIGH (ref 4.8–10.8)

## 2023-09-12 PROCEDURE — 86900 BLOOD TYPING SEROLOGIC ABO: CPT

## 2023-09-12 PROCEDURE — 74177 CT ABD & PELVIS W/CONTRAST: CPT | Mod: 26,MA

## 2023-09-12 PROCEDURE — 83605 ASSAY OF LACTIC ACID: CPT

## 2023-09-12 PROCEDURE — 85025 COMPLETE CBC W/AUTO DIFF WBC: CPT

## 2023-09-12 PROCEDURE — C1889: CPT

## 2023-09-12 PROCEDURE — 83690 ASSAY OF LIPASE: CPT

## 2023-09-12 PROCEDURE — 80076 HEPATIC FUNCTION PANEL: CPT

## 2023-09-12 PROCEDURE — 85730 THROMBOPLASTIN TIME PARTIAL: CPT

## 2023-09-12 PROCEDURE — 36415 COLL VENOUS BLD VENIPUNCTURE: CPT

## 2023-09-12 PROCEDURE — 76705 ECHO EXAM OF ABDOMEN: CPT | Mod: 26

## 2023-09-12 PROCEDURE — 84100 ASSAY OF PHOSPHORUS: CPT

## 2023-09-12 PROCEDURE — 99223 1ST HOSP IP/OBS HIGH 75: CPT

## 2023-09-12 PROCEDURE — 80048 BASIC METABOLIC PNL TOTAL CA: CPT

## 2023-09-12 PROCEDURE — 82962 GLUCOSE BLOOD TEST: CPT

## 2023-09-12 PROCEDURE — 85610 PROTHROMBIN TIME: CPT

## 2023-09-12 PROCEDURE — 86901 BLOOD TYPING SEROLOGIC RH(D): CPT

## 2023-09-12 PROCEDURE — S2900: CPT

## 2023-09-12 PROCEDURE — 86850 RBC ANTIBODY SCREEN: CPT

## 2023-09-12 PROCEDURE — 99285 EMERGENCY DEPT VISIT HI MDM: CPT

## 2023-09-12 PROCEDURE — 99222 1ST HOSP IP/OBS MODERATE 55: CPT

## 2023-09-12 PROCEDURE — 85027 COMPLETE CBC AUTOMATED: CPT

## 2023-09-12 PROCEDURE — 83735 ASSAY OF MAGNESIUM: CPT

## 2023-09-12 PROCEDURE — 88304 TISSUE EXAM BY PATHOLOGIST: CPT

## 2023-09-12 PROCEDURE — 80053 COMPREHEN METABOLIC PANEL: CPT

## 2023-09-12 PROCEDURE — C9399: CPT

## 2023-09-12 RX ORDER — DEXTROSE 50 % IN WATER 50 %
15 SYRINGE (ML) INTRAVENOUS ONCE
Refills: 0 | Status: DISCONTINUED | OUTPATIENT
Start: 2023-09-12 | End: 2023-09-15

## 2023-09-12 RX ORDER — INSULIN LISPRO 100/ML
VIAL (ML) SUBCUTANEOUS EVERY 6 HOURS
Refills: 0 | Status: DISCONTINUED | OUTPATIENT
Start: 2023-09-12 | End: 2023-09-15

## 2023-09-12 RX ORDER — DEXTROSE 50 % IN WATER 50 %
12.5 SYRINGE (ML) INTRAVENOUS ONCE
Refills: 0 | Status: DISCONTINUED | OUTPATIENT
Start: 2023-09-12 | End: 2023-09-15

## 2023-09-12 RX ORDER — METFORMIN HYDROCHLORIDE 850 MG/1
1 TABLET ORAL
Refills: 0 | DISCHARGE

## 2023-09-12 RX ORDER — SODIUM CHLORIDE 9 MG/ML
1000 INJECTION, SOLUTION INTRAVENOUS
Refills: 0 | Status: DISCONTINUED | OUTPATIENT
Start: 2023-09-12 | End: 2023-09-15

## 2023-09-12 RX ORDER — APIXABAN 2.5 MG/1
5 TABLET, FILM COATED ORAL EVERY 12 HOURS
Refills: 0 | Status: DISCONTINUED | OUTPATIENT
Start: 2023-09-13 | End: 2023-09-13

## 2023-09-12 RX ORDER — ONDANSETRON 8 MG/1
4 TABLET, FILM COATED ORAL EVERY 8 HOURS
Refills: 0 | Status: DISCONTINUED | OUTPATIENT
Start: 2023-09-12 | End: 2023-09-15

## 2023-09-12 RX ORDER — SODIUM CHLORIDE 9 MG/ML
1000 INJECTION, SOLUTION INTRAVENOUS ONCE
Refills: 0 | Status: COMPLETED | OUTPATIENT
Start: 2023-09-12 | End: 2023-09-12

## 2023-09-12 RX ORDER — ATORVASTATIN CALCIUM 80 MG/1
1 TABLET, FILM COATED ORAL
Refills: 0 | DISCHARGE

## 2023-09-12 RX ORDER — MORPHINE SULFATE 50 MG/1
6 CAPSULE, EXTENDED RELEASE ORAL ONCE
Refills: 0 | Status: DISCONTINUED | OUTPATIENT
Start: 2023-09-12 | End: 2023-09-12

## 2023-09-12 RX ORDER — METOPROLOL TARTRATE 50 MG
25 TABLET ORAL DAILY
Refills: 0 | Status: DISCONTINUED | OUTPATIENT
Start: 2023-09-12 | End: 2023-09-13

## 2023-09-12 RX ORDER — SODIUM CHLORIDE 9 MG/ML
1000 INJECTION, SOLUTION INTRAVENOUS
Refills: 0 | Status: DISCONTINUED | OUTPATIENT
Start: 2023-09-12 | End: 2023-09-12

## 2023-09-12 RX ORDER — OMEPRAZOLE 10 MG/1
1 CAPSULE, DELAYED RELEASE ORAL
Refills: 0 | DISCHARGE

## 2023-09-12 RX ORDER — DEXTROSE 50 % IN WATER 50 %
25 SYRINGE (ML) INTRAVENOUS ONCE
Refills: 0 | Status: DISCONTINUED | OUTPATIENT
Start: 2023-09-12 | End: 2023-09-15

## 2023-09-12 RX ORDER — ACETAMINOPHEN 500 MG
650 TABLET ORAL EVERY 6 HOURS
Refills: 0 | Status: DISCONTINUED | OUTPATIENT
Start: 2023-09-12 | End: 2023-09-15

## 2023-09-12 RX ORDER — MECLIZINE HCL 12.5 MG
1 TABLET ORAL
Refills: 0 | DISCHARGE

## 2023-09-12 RX ORDER — MORPHINE SULFATE 50 MG/1
4 CAPSULE, EXTENDED RELEASE ORAL ONCE
Refills: 0 | Status: DISCONTINUED | OUTPATIENT
Start: 2023-09-12 | End: 2023-09-12

## 2023-09-12 RX ORDER — LANOLIN ALCOHOL/MO/W.PET/CERES
3 CREAM (GRAM) TOPICAL AT BEDTIME
Refills: 0 | Status: DISCONTINUED | OUTPATIENT
Start: 2023-09-12 | End: 2023-09-15

## 2023-09-12 RX ORDER — GABAPENTIN 400 MG/1
100 CAPSULE ORAL AT BEDTIME
Refills: 0 | Status: DISCONTINUED | OUTPATIENT
Start: 2023-09-12 | End: 2023-09-15

## 2023-09-12 RX ORDER — MORPHINE SULFATE 50 MG/1
4 CAPSULE, EXTENDED RELEASE ORAL EVERY 4 HOURS
Refills: 0 | Status: DISCONTINUED | OUTPATIENT
Start: 2023-09-12 | End: 2023-09-15

## 2023-09-12 RX ORDER — INFLUENZA VIRUS VACCINE 15; 15; 15; 15 UG/.5ML; UG/.5ML; UG/.5ML; UG/.5ML
0.7 SUSPENSION INTRAMUSCULAR ONCE
Refills: 0 | Status: DISCONTINUED | OUTPATIENT
Start: 2023-09-12 | End: 2023-09-16

## 2023-09-12 RX ORDER — ATORVASTATIN CALCIUM 80 MG/1
40 TABLET, FILM COATED ORAL AT BEDTIME
Refills: 0 | Status: DISCONTINUED | OUTPATIENT
Start: 2023-09-12 | End: 2023-09-15

## 2023-09-12 RX ORDER — PANTOPRAZOLE SODIUM 20 MG/1
40 TABLET, DELAYED RELEASE ORAL
Refills: 0 | Status: DISCONTINUED | OUTPATIENT
Start: 2023-09-12 | End: 2023-09-15

## 2023-09-12 RX ORDER — CEFOTETAN DISODIUM 1 G
2 VIAL (EA) INJECTION ONCE
Refills: 0 | Status: COMPLETED | OUTPATIENT
Start: 2023-09-12 | End: 2023-09-12

## 2023-09-12 RX ORDER — GLUCAGON INJECTION, SOLUTION 0.5 MG/.1ML
1 INJECTION, SOLUTION SUBCUTANEOUS ONCE
Refills: 0 | Status: DISCONTINUED | OUTPATIENT
Start: 2023-09-12 | End: 2023-09-15

## 2023-09-12 RX ADMIN — SODIUM CHLORIDE 1000 MILLILITER(S): 9 INJECTION, SOLUTION INTRAVENOUS at 09:56

## 2023-09-12 RX ADMIN — ATORVASTATIN CALCIUM 40 MILLIGRAM(S): 80 TABLET, FILM COATED ORAL at 21:50

## 2023-09-12 RX ADMIN — MORPHINE SULFATE 4 MILLIGRAM(S): 50 CAPSULE, EXTENDED RELEASE ORAL at 22:03

## 2023-09-12 RX ADMIN — MORPHINE SULFATE 4 MILLIGRAM(S): 50 CAPSULE, EXTENDED RELEASE ORAL at 21:53

## 2023-09-12 RX ADMIN — MORPHINE SULFATE 6 MILLIGRAM(S): 50 CAPSULE, EXTENDED RELEASE ORAL at 16:56

## 2023-09-12 RX ADMIN — MORPHINE SULFATE 4 MILLIGRAM(S): 50 CAPSULE, EXTENDED RELEASE ORAL at 16:25

## 2023-09-12 RX ADMIN — SODIUM CHLORIDE 150 MILLILITER(S): 9 INJECTION, SOLUTION INTRAVENOUS at 21:50

## 2023-09-12 RX ADMIN — Medication 100 GRAM(S): at 13:04

## 2023-09-12 RX ADMIN — MORPHINE SULFATE 4 MILLIGRAM(S): 50 CAPSULE, EXTENDED RELEASE ORAL at 13:13

## 2023-09-12 RX ADMIN — Medication 25 MILLIGRAM(S): at 21:50

## 2023-09-12 RX ADMIN — GABAPENTIN 100 MILLIGRAM(S): 400 CAPSULE ORAL at 21:53

## 2023-09-12 NOTE — CONSULT NOTE ADULT - SUBJECTIVE AND OBJECTIVE BOX
Chief complaint/Reason for consult: pancreatitis     HPI: 72yMale pmh high cholesterol, BP, known gallstones, past history of pancreatitis presents for epigastric pain. Patient denies nausea, vomiting, hematemesis, melena, blood in stool, diarrhea, constipation. +epigastric pain.      PAST MEDICAL & SURGICAL HISTORY:   High cholesterol      Elevated cholesterol with high triglycerides      Pancreatitis      Acid reflux      BP (high blood pressure)      No significant past surgical history            Family history:  FAMILY HISTORY:    No GI cancers in first or second degree relatives    Social History: No smoking. No alcohol. No illegal drug use.    Allergies:   No Known Allergies  Intolerances      MEDICATIONS: Home Medications:  aspirin 81 mg oral delayed release capsule: orally (28 Aug 2023 22:27)  atorvastatin 20 mg oral tablet: 1 orally (28 Aug 2023 22:27)  gabapentin 100 mg oral capsule: 1 orally (28 Aug 2023 22:27)  meclizine 12.5 mg oral tablet: 1 orally (28 Aug 2023 22:27)  metFORMIN 500 mg oral tablet: 1 orally (28 Aug 2023 22:27)  omeprazole 10 mg oral powder for reconstitution, delayed release: 1 orally (28 Aug 2023 22:27)  pioglitazone 30 mg oral tablet: 1 orally (28 Aug 2023 22:27)    MEDICATIONS  (STANDING):  cefoTEtan  IVPB 2 Gram(s) IV Intermittent Once      REVIEW OF SYSTEMS  General:  No weight loss, fevers, or chills.  Eyes:  No reported pain or visual changes  ENT:  No sore throat or runny nose.  NECK: No stiffness or lymphadenopathy  CV:  No chest pain or palpitations.  Resp:  No shortness of breath, cough, wheezing or hemoptysis  GI:  No abdominal pain, nausea, vomiting, dysphagia, diarrhea or constipation. No rectal bleeding, melena, or hematemesis.  Muscle:  No aches or weakness  Neuro:  No tingling, numbness       VITALS:   T(F): 97.1 (09-12-23 @ 09:20), Max: 97.1 (09-12-23 @ 09:20)  HR: 67 (09-12-23 @ 09:20) (67 - 67)  BP: 171/78 (09-12-23 @ 09:20) (171/78 - 171/78)  RR: 18 (09-12-23 @ 09:20) (18 - 18)  SpO2: 99% (09-12-23 @ 09:20) (99% - 99%)    PHYSICAL EXAM:  GENERAL: AAOx3, no acute distress.  HEAD:  Atraumatic, Normocephalic  EYES: conjunctiva and sclera clear  NECK: Supple, No thyromegaly   CHEST/LUNG: Clear to auscultation bilaterally; No wheeze, rhonchi, or rales  HEART: Regular rate and rhythm; normal S1, S2, No murmurs.  ABDOMEN: Soft, nontender, nondistended; Bowel sounds present  NEUROLOGY: No asterixis or tremor  SKIN: Intact, no jaundice          LABS:  09-12    139  |  99  |  13  ----------------------------<  125<H>  4.3   |  28  |  0.7    Ca    10.8<H>      12 Sep 2023 09:45    TPro  6.9  /  Alb  4.6  /  TBili  0.8  /  DBili  x   /  AST  24  /  ALT  34  /  AlkPhos  40  09-12                          14.0   16.44 )-----------( 299      ( 12 Sep 2023 09:45 )             42.5     LIVER FUNCTIONS - ( 12 Sep 2023 09:45 )  Alb: 4.6 g/dL / Pro: 6.9 g/dL / ALK PHOS: 40 U/L / ALT: 34 U/L / AST: 24 U/L / GGT: x           PT/INR - ( 12 Sep 2023 09:45 )   PT: 14.20 sec;   INR: 1.24 ratio         PTT - ( 12 Sep 2023 09:45 )  PTT:35.6 sec    IMAGING:    < from: CT Abdomen and Pelvis w/ IV Cont (08.28.23 @ 13:16) >  ACC: [id number] EXAM:  CT ABDOMEN AND PELVIS IC   ORDERED BY: KENIA LOYOLA     PROCEDURE DATE:  08/28/2023          INTERPRETATION:  Clinical Indication: Abdominal pain.    Technique: Multidetector-row CT images of the abdomen and pelvis were   obtained from the xiphoid through the symphysis pubis following the   administration of intravenous contrast. No oral contrast was   administered.  Coronal and sagittal reconstructions were performed.    Contrast: 95 cc Omnipaque 350 non-ionic intravenous contrast.    Comparison: CT abdomen pelvis 10/17/2013.    Findings:    01. LIVER: Normal morphology.  No focal lesion.  02. SPLEEN: Essentially stable splenomegaly measuring 14.6 cm  03. PANCREAS: Normal.  04. GALLBLADDER/BILIARY TREE: Cholelithiasis.Mildly thickened   gallbladder wall measuring 4 mm. No biliary dilation.  05. ADRENALS: Normal.  06. KIDNEYS: Symmetric enhancement.  No hydronephrosis, renal stone, or   soft tissue attenuation mass. Bilateral renal cysts.  07. LYMPHADENOPATHY/RETROPERITONEUM: No lymphadenopathy.  08. BOWEL: No bowel obstruction. Unremarkable appendix.  09. PELVIC VISCERA: Prostate measures 5.2 cm in the transverse dimension   (series 301 image 104). Under distended urinary bladder.  10. PELVIC LYMPH NODES: No lymphadenopathy.  11. VASCULATURE: Normal caliber abdominal aorta. Calcified   atherosclerotic disease of the aorta and its branches.  12. PERITONEUM/ABDOMINAL WALL: No ascites.  13. SKELETAL: Degenerative changes. Stable compression deformity of the   T12 vertebral body  14. LUNG BASES: Small bilateral atelectasis.    IMPRESSION:    Cholelithiasis with mild gallbladder wall thickening. Findings are   indeterminate. Recommend abdominal ultrasound for further evaluation.    Essentially stable splenomegaly.      --- End of Report ---            CHELSEA HOLLOWAY MD; Attending Radiologist  This document has been electronically signed. Aug 28 2023  4:02PM    < end of copied text >      < from: US Abdomen Upper Quadrant Right (09.12.23 @ 10:48) >    ACC: [id number] EXAM:  US ABDOMEN RT UPR QUADRANT   ORDERED BY: MARIBEL COLON     PROCEDURE DATE:  09/12/2023          INTERPRETATION:  CLINICAL INFORMATION: Right upper quadrant pain    COMPARISON: Abdominal ultrasound 8/28/2023, CT abdomen and pelvis   8/20/2023    TECHNIQUE: Sonography of the right upper quadrant.    FINDINGS:  Liver: Increased echogenicity.  Bile ducts: Normal caliber. Common bile duct measures 7 mm.  Gallbladder: Cholelithiasis with gallbladder sludge visualized. Mildly   distended gallbladder wall. No sonographic Garcia sign or gallbladder   distention.  Pancreas: Pancreatic duct dilatation measuring 4.2 mm.  Right kidney: 12.4 cm. No hydronephrosis. Upper pole cyst measuring 3.4   cm.  Ascites: None.  IVC: Visualizedportions are within normal limits.    IMPRESSION:  Cholelithiasis with gallbladder sludge visualized.No sonographic Garcia   sign or gallbladder distention.    Since 8/28/2023, there is interval pancreatic duct dilatation.  Hepatic steatosis.    --- End of Report ---          SETH ZHANG MD; Resident Radiologist  This document has been electronically signed.  CHRISTIANA GARCIA MD; Attending Interventional Radiologist  This document has been electronically signed. Sep 12 2023 11:12AM    < end of copied text >   Chief complaint/Reason for consult: pancreatitis     HPI: 72yMale pmh high cholesterol, BP, known gallstones, past history of pancreatitis presents for epigastric pain. Patient denies nausea, vomiting, hematemesis, melena, blood in stool, diarrhea, constipation. +epigastric pain.      PAST MEDICAL & SURGICAL HISTORY:   High cholesterol      Elevated cholesterol with high triglycerides      Pancreatitis      Acid reflux      BP (high blood pressure)      No significant past surgical history            Family history:  FAMILY HISTORY:    No GI cancers in first or second degree relatives    Social History: No smoking. No alcohol. No illegal drug use.    Allergies:   No Known Allergies  Intolerances      MEDICATIONS: Home Medications:  aspirin 81 mg oral delayed release capsule: orally (28 Aug 2023 22:27)  atorvastatin 20 mg oral tablet: 1 orally (28 Aug 2023 22:27)  gabapentin 100 mg oral capsule: 1 orally (28 Aug 2023 22:27)  meclizine 12.5 mg oral tablet: 1 orally (28 Aug 2023 22:27)  metFORMIN 500 mg oral tablet: 1 orally (28 Aug 2023 22:27)  omeprazole 10 mg oral powder for reconstitution, delayed release: 1 orally (28 Aug 2023 22:27)  pioglitazone 30 mg oral tablet: 1 orally (28 Aug 2023 22:27)    MEDICATIONS  (STANDING):  cefoTEtan  IVPB 2 Gram(s) IV Intermittent Once      REVIEW OF SYSTEMS  General:  No weight loss, fevers, or chills.  Eyes:  No reported pain or visual changes  ENT:  No sore throat or runny nose.  NECK: No stiffness or lymphadenopathy  CV:  No chest pain or palpitations.  Resp:  No shortness of breath, cough, wheezing or hemoptysis  GI:  +epigastric abdominal pain, no nausea, vomiting, dysphagia, diarrhea or constipation. No rectal bleeding, melena, or hematemesis.  Neuro:  No tingling, numbness       VITALS:   T(F): 97.1 (09-12-23 @ 09:20), Max: 97.1 (09-12-23 @ 09:20)  HR: 67 (09-12-23 @ 09:20) (67 - 67)  BP: 171/78 (09-12-23 @ 09:20) (171/78 - 171/78)  RR: 18 (09-12-23 @ 09:20) (18 - 18)  SpO2: 99% (09-12-23 @ 09:20) (99% - 99%)    PHYSICAL EXAM:  GENERAL: AAOx3, no acute distress.  HEAD:  Atraumatic, Normocephalic  EYES: conjunctiva and sclera clear  NECK: Supple, No thyromegaly   CHEST/LUNG: Clear to auscultation bilaterally; No wheeze, rhonchi, or rales  HEART: Regular rate and rhythm; normal S1, S2, No murmurs.  ABDOMEN: Soft, +epigastric tenderness, nondistended; Bowel sounds present  NEUROLOGY: No asterixis or tremor  SKIN: Intact, no jaundice          LABS:  09-12    139  |  99  |  13  ----------------------------<  125<H>  4.3   |  28  |  0.7    Ca    10.8<H>      12 Sep 2023 09:45    TPro  6.9  /  Alb  4.6  /  TBili  0.8  /  DBili  x   /  AST  24  /  ALT  34  /  AlkPhos  40  09-12                          14.0   16.44 )-----------( 299      ( 12 Sep 2023 09:45 )             42.5     LIVER FUNCTIONS - ( 12 Sep 2023 09:45 )  Alb: 4.6 g/dL / Pro: 6.9 g/dL / ALK PHOS: 40 U/L / ALT: 34 U/L / AST: 24 U/L / GGT: x           PT/INR - ( 12 Sep 2023 09:45 )   PT: 14.20 sec;   INR: 1.24 ratio         PTT - ( 12 Sep 2023 09:45 )  PTT:35.6 sec    IMAGING:    < from: CT Abdomen and Pelvis w/ IV Cont (08.28.23 @ 13:16) >  ACC: [id number] EXAM:  CT ABDOMEN AND PELVIS IC   ORDERED BY: KENIA LOYOLA     PROCEDURE DATE:  08/28/2023          INTERPRETATION:  Clinical Indication: Abdominal pain.    Technique: Multidetector-row CT images of the abdomen and pelvis were   obtained from the xiphoid through the symphysis pubis following the   administration of intravenous contrast. No oral contrast was   administered.  Coronal and sagittal reconstructions were performed.    Contrast: 95 cc Omnipaque 350 non-ionic intravenous contrast.    Comparison: CT abdomen pelvis 10/17/2013.    Findings:    01. LIVER: Normal morphology.  No focal lesion.  02. SPLEEN: Essentially stable splenomegaly measuring 14.6 cm  03. PANCREAS: Normal.  04. GALLBLADDER/BILIARY TREE: Cholelithiasis.Mildly thickened   gallbladder wall measuring 4 mm. No biliary dilation.  05. ADRENALS: Normal.  06. KIDNEYS: Symmetric enhancement.  No hydronephrosis, renal stone, or   soft tissue attenuation mass. Bilateral renal cysts.  07. LYMPHADENOPATHY/RETROPERITONEUM: No lymphadenopathy.  08. BOWEL: No bowel obstruction. Unremarkable appendix.  09. PELVIC VISCERA: Prostate measures 5.2 cm in the transverse dimension   (series 301 image 104). Under distended urinary bladder.  10. PELVIC LYMPH NODES: No lymphadenopathy.  11. VASCULATURE: Normal caliber abdominal aorta. Calcified   atherosclerotic disease of the aorta and its branches.  12. PERITONEUM/ABDOMINAL WALL: No ascites.  13. SKELETAL: Degenerative changes. Stable compression deformity of the   T12 vertebral body  14. LUNG BASES: Small bilateral atelectasis.    IMPRESSION:    Cholelithiasis with mild gallbladder wall thickening. Findings are   indeterminate. Recommend abdominal ultrasound for further evaluation.    Essentially stable splenomegaly.      --- End of Report ---            CHELSEA HOLLOWAY MD; Attending Radiologist  This document has been electronically signed. Aug 28 2023  4:02PM    < end of copied text >      < from: US Abdomen Upper Quadrant Right (09.12.23 @ 10:48) >    ACC: [id number] EXAM:  US ABDOMEN RT UPR QUADRANT   ORDERED BY: MARIBEL COLON     PROCEDURE DATE:  09/12/2023          INTERPRETATION:  CLINICAL INFORMATION: Right upper quadrant pain    COMPARISON: Abdominal ultrasound 8/28/2023, CT abdomen and pelvis   8/20/2023    TECHNIQUE: Sonography of the right upper quadrant.    FINDINGS:  Liver: Increased echogenicity.  Bile ducts: Normal caliber. Common bile duct measures 7 mm.  Gallbladder: Cholelithiasis with gallbladder sludge visualized. Mildly   distended gallbladder wall. No sonographic Garcia sign or gallbladder   distention.  Pancreas: Pancreatic duct dilatation measuring 4.2 mm.  Right kidney: 12.4 cm. No hydronephrosis. Upper pole cyst measuring 3.4   cm.  Ascites: None.  IVC: Visualizedportions are within normal limits.    IMPRESSION:  Cholelithiasis with gallbladder sludge visualized.No sonographic Garcia   sign or gallbladder distention.    Since 8/28/2023, there is interval pancreatic duct dilatation.  Hepatic steatosis.    --- End of Report ---          SETH ZHANG MD; Resident Radiologist  This document has been electronically signed.  CHRISTIANA GARCIA MD; Attending Interventional Radiologist  This document has been electronically signed. Sep 12 2023 11:12AM    < end of copied text >   Chief complaint/Reason for consult: pancreatitis     HPI: 72yMale pmh high cholesterol, BP, known gallstones, past history of pancreatitis presents for epigastric pain radiating to the back starting yesterday, patient was working for outpatient [employer] after cardiac optimization after having new onset A-fib last admission. Patient denies nausea, vomiting, hematemesis, melena, blood in stool, diarrhea, constipation. +epigastric pain. no new weight loss      PAST MEDICAL & SURGICAL HISTORY:   High cholesterol      Elevated cholesterol with high triglycerides      Pancreatitis      Acid reflux      BP (high blood pressure)      No significant past surgical history            Family history:  FAMILY HISTORY:    No GI cancers in first or second degree relatives    Social History: +daily cigarette smoking. No alcohol. No illegal drug use.    Allergies:   No Known Allergies  Intolerances      MEDICATIONS: Home Medications:  aspirin 81 mg oral delayed release capsule: orally (28 Aug 2023 22:27)  atorvastatin 20 mg oral tablet: 1 orally (28 Aug 2023 22:27)  gabapentin 100 mg oral capsule: 1 orally (28 Aug 2023 22:27)  meclizine 12.5 mg oral tablet: 1 orally (28 Aug 2023 22:27)  metFORMIN 500 mg oral tablet: 1 orally (28 Aug 2023 22:27)  omeprazole 10 mg oral powder for reconstitution, delayed release: 1 orally (28 Aug 2023 22:27)  pioglitazone 30 mg oral tablet: 1 orally (28 Aug 2023 22:27)    MEDICATIONS  (STANDING):  cefoTEtan  IVPB 2 Gram(s) IV Intermittent Once      REVIEW OF SYSTEMS  General:  No weight loss, fevers, or chills.  Eyes:  No reported pain or visual changes  ENT:  No sore throat or runny nose.  NECK: No stiffness or lymphadenopathy  CV:  No chest pain or palpitations.  Resp:  No shortness of breath, cough, wheezing or hemoptysis  GI:  +epigastric abdominal pain, no nausea, vomiting, dysphagia, diarrhea or constipation. No rectal bleeding, melena, or hematemesis.  Neuro:  No tingling, numbness       VITALS:   T(F): 97.1 (09-12-23 @ 09:20), Max: 97.1 (09-12-23 @ 09:20)  HR: 67 (09-12-23 @ 09:20) (67 - 67)  BP: 171/78 (09-12-23 @ 09:20) (171/78 - 171/78)  RR: 18 (09-12-23 @ 09:20) (18 - 18)  SpO2: 99% (09-12-23 @ 09:20) (99% - 99%)    PHYSICAL EXAM:  GENERAL: AAOx3, no acute distress.  HEAD:  Atraumatic, Normocephalic  EYES: conjunctiva and sclera clear  NECK: Supple, No thyromegaly   CHEST/LUNG: Clear to auscultation bilaterally; No wheeze, rhonchi, or rales  HEART: Regular rate and rhythm; normal S1, S2, No murmurs.  ABDOMEN: Soft, +epigastric tenderness, nondistended; Bowel sounds present  NEUROLOGY: No asterixis or tremor  SKIN: Intact, no jaundice          LABS:  09-12    139  |  99  |  13  ----------------------------<  125<H>  4.3   |  28  |  0.7    Ca    10.8<H>      12 Sep 2023 09:45    TPro  6.9  /  Alb  4.6  /  TBili  0.8  /  DBili  x   /  AST  24  /  ALT  34  /  AlkPhos  40  09-12                          14.0   16.44 )-----------( 299      ( 12 Sep 2023 09:45 )             42.5     LIVER FUNCTIONS - ( 12 Sep 2023 09:45 )  Alb: 4.6 g/dL / Pro: 6.9 g/dL / ALK PHOS: 40 U/L / ALT: 34 U/L / AST: 24 U/L / GGT: x           PT/INR - ( 12 Sep 2023 09:45 )   PT: 14.20 sec;   INR: 1.24 ratio         PTT - ( 12 Sep 2023 09:45 )  PTT:35.6 sec    IMAGING:    < from: CT Abdomen and Pelvis w/ IV Cont (08.28.23 @ 13:16) >  ACC: [id number] EXAM:  CT ABDOMEN AND PELVIS IC   ORDERED BY: KENIA LOYOLA     PROCEDURE DATE:  08/28/2023          INTERPRETATION:  Clinical Indication: Abdominal pain.    Technique: Multidetector-row CT images of the abdomen and pelvis were   obtained from the xiphoid through the symphysis pubis following the   administration of intravenous contrast. No oral contrast was   administered.  Coronal and sagittal reconstructions were performed.    Contrast: 95 cc Omnipaque 350 non-ionic intravenous contrast.    Comparison: CT abdomen pelvis 10/17/2013.    Findings:    01. LIVER: Normal morphology.  No focal lesion.  02. SPLEEN: Essentially stable splenomegaly measuring 14.6 cm  03. PANCREAS: Normal.  04. GALLBLADDER/BILIARY TREE: Cholelithiasis.Mildly thickened   gallbladder wall measuring 4 mm. No biliary dilation.  05. ADRENALS: Normal.  06. KIDNEYS: Symmetric enhancement.  No hydronephrosis, renal stone, or   soft tissue attenuation mass. Bilateral renal cysts.  07. LYMPHADENOPATHY/RETROPERITONEUM: No lymphadenopathy.  08. BOWEL: No bowel obstruction. Unremarkable appendix.  09. PELVIC VISCERA: Prostate measures 5.2 cm in the transverse dimension   (series 301 image 104). Under distended urinary bladder.  10. PELVIC LYMPH NODES: No lymphadenopathy.  11. VASCULATURE: Normal caliber abdominal aorta. Calcified   atherosclerotic disease of the aorta and its branches.  12. PERITONEUM/ABDOMINAL WALL: No ascites.  13. SKELETAL: Degenerative changes. Stable compression deformity of the   T12 vertebral body  14. LUNG BASES: Small bilateral atelectasis.    IMPRESSION:    Cholelithiasis with mild gallbladder wall thickening. Findings are   indeterminate. Recommend abdominal ultrasound for further evaluation.    Essentially stable splenomegaly.      --- End of Report ---            CHELSEA HOLLOWAY MD; Attending Radiologist  This document has been electronically signed. Aug 28 2023  4:02PM    < end of copied text >      < from: US Abdomen Upper Quadrant Right (09.12.23 @ 10:48) >    ACC: [id number] EXAM:  US ABDOMEN RT UPR QUADRANT   ORDERED BY: MARIBEL COLON     PROCEDURE DATE:  09/12/2023          INTERPRETATION:  CLINICAL INFORMATION: Right upper quadrant pain    COMPARISON: Abdominal ultrasound 8/28/2023, CT abdomen and pelvis   8/20/2023    TECHNIQUE: Sonography of the right upper quadrant.    FINDINGS:  Liver: Increased echogenicity.  Bile ducts: Normal caliber. Common bile duct measures 7 mm.  Gallbladder: Cholelithiasis with gallbladder sludge visualized. Mildly   distended gallbladder wall. No sonographic Garcia sign or gallbladder   distention.  Pancreas: Pancreatic duct dilatation measuring 4.2 mm.  Right kidney: 12.4 cm. No hydronephrosis. Upper pole cyst measuring 3.4   cm.  Ascites: None.  IVC: Visualizedportions are within normal limits.    IMPRESSION:  Cholelithiasis with gallbladder sludge visualized.No sonographic Garcia   sign or gallbladder distention.    Since 8/28/2023, there is interval pancreatic duct dilatation.  Hepatic steatosis.    --- End of Report ---          SETH ZHANG MD; Resident Radiologist  This document has been electronically signed.  CHRISTIANA GARCIA MD; Attending Interventional Radiologist  This document has been electronically signed. Sep 12 2023 11:12AM    < end of copied text >   Chief complaint/Reason for consult: pancreatitis     HPI: 72yMale pmh DL, DM2, heavy smoker (2-3 PPD), GERD, known gallstones, past history of pancreatitis presents for epigastric pain radiating to the back starting yesterday, patient was working for outpatient [employer] after cardiac optimization after having new onset A-fib last admission. Patient denies nausea, vomiting, hematemesis, melena, blood in stool, diarrhea, constipation. +epigastric pain. no new weight loss  chronic smoker.   he has no fam hx of panc ca.   no unintentional weight loss over the past 6-12 mo.   only lost weight during the last admission.    PAST MEDICAL & SURGICAL HISTORY:   High cholesterol      Elevated cholesterol with high triglycerides      Pancreatitis      Acid reflux      BP (high blood pressure)      No significant past surgical history            Family history:  FAMILY HISTORY:    No GI cancers in first or second degree relatives    Social History: +daily cigarette smoking. No alcohol. No illegal drug use.    Allergies:   No Known Allergies  Intolerances      MEDICATIONS: Home Medications:  aspirin 81 mg oral delayed release capsule: orally (28 Aug 2023 22:27)  atorvastatin 20 mg oral tablet: 1 orally (28 Aug 2023 22:27)  gabapentin 100 mg oral capsule: 1 orally (28 Aug 2023 22:27)  meclizine 12.5 mg oral tablet: 1 orally (28 Aug 2023 22:27)  metFORMIN 500 mg oral tablet: 1 orally (28 Aug 2023 22:27)  omeprazole 10 mg oral powder for reconstitution, delayed release: 1 orally (28 Aug 2023 22:27)  pioglitazone 30 mg oral tablet: 1 orally (28 Aug 2023 22:27)    MEDICATIONS  (STANDING):  cefoTEtan  IVPB 2 Gram(s) IV Intermittent Once      REVIEW OF SYSTEMS  General:  No weight loss, fevers, or chills.  Eyes:  No reported pain or visual changes  ENT:  No sore throat or runny nose.  NECK: No stiffness or lymphadenopathy  CV:  No chest pain or palpitations.  Resp:  No shortness of breath, cough, wheezing or hemoptysis  GI:  +epigastric abdominal pain, no nausea, vomiting, dysphagia, diarrhea or constipation. No rectal bleeding, melena, or hematemesis.  Neuro:  No tingling, numbness       VITALS:   T(F): 97.1 (09-12-23 @ 09:20), Max: 97.1 (09-12-23 @ 09:20)  HR: 67 (09-12-23 @ 09:20) (67 - 67)  BP: 171/78 (09-12-23 @ 09:20) (171/78 - 171/78)  RR: 18 (09-12-23 @ 09:20) (18 - 18)  SpO2: 99% (09-12-23 @ 09:20) (99% - 99%)    PHYSICAL EXAM:  GENERAL: AAOx3, no acute distress.  HEAD:  Atraumatic, Normocephalic  EYES: conjunctiva and sclera clear  NECK: Supple, No thyromegaly   CHEST/LUNG: Clear to auscultation bilaterally; No wheeze, rhonchi, or rales  HEART: Regular rate and rhythm; normal S1, S2, No murmurs.  ABDOMEN: Soft, +epigastric tenderness, nondistended; Bowel sounds present  NEUROLOGY: No asterixis or tremor  SKIN: Intact, no jaundice          LABS:  09-12    139  |  99  |  13  ----------------------------<  125<H>  4.3   |  28  |  0.7    Ca    10.8<H>      12 Sep 2023 09:45    TPro  6.9  /  Alb  4.6  /  TBili  0.8  /  DBili  x   /  AST  24  /  ALT  34  /  AlkPhos  40  09-12                          14.0   16.44 )-----------( 299      ( 12 Sep 2023 09:45 )             42.5     LIVER FUNCTIONS - ( 12 Sep 2023 09:45 )  Alb: 4.6 g/dL / Pro: 6.9 g/dL / ALK PHOS: 40 U/L / ALT: 34 U/L / AST: 24 U/L / GGT: x           PT/INR - ( 12 Sep 2023 09:45 )   PT: 14.20 sec;   INR: 1.24 ratio         PTT - ( 12 Sep 2023 09:45 )  PTT:35.6 sec    IMAGING:    < from: CT Abdomen and Pelvis w/ IV Cont (08.28.23 @ 13:16) >  ACC: [id number] EXAM:  CT ABDOMEN AND PELVIS IC   ORDERED BY: KENIA LOYOLA     PROCEDURE DATE:  08/28/2023          INTERPRETATION:  Clinical Indication: Abdominal pain.    Technique: Multidetector-row CT images of the abdomen and pelvis were   obtained from the xiphoid through the symphysis pubis following the   administration of intravenous contrast. No oral contrast was   administered.  Coronal and sagittal reconstructions were performed.    Contrast: 95 cc Omnipaque 350 non-ionic intravenous contrast.    Comparison: CT abdomen pelvis 10/17/2013.    Findings:    01. LIVER: Normal morphology.  No focal lesion.  02. SPLEEN: Essentially stable splenomegaly measuring 14.6 cm  03. PANCREAS: Normal.  04. GALLBLADDER/BILIARY TREE: Cholelithiasis.Mildly thickened   gallbladder wall measuring 4 mm. No biliary dilation.  05. ADRENALS: Normal.  06. KIDNEYS: Symmetric enhancement.  No hydronephrosis, renal stone, or   soft tissue attenuation mass. Bilateral renal cysts.  07. LYMPHADENOPATHY/RETROPERITONEUM: No lymphadenopathy.  08. BOWEL: No bowel obstruction. Unremarkable appendix.  09. PELVIC VISCERA: Prostate measures 5.2 cm in the transverse dimension   (series 301 image 104). Under distended urinary bladder.  10. PELVIC LYMPH NODES: No lymphadenopathy.  11. VASCULATURE: Normal caliber abdominal aorta. Calcified   atherosclerotic disease of the aorta and its branches.  12. PERITONEUM/ABDOMINAL WALL: No ascites.  13. SKELETAL: Degenerative changes. Stable compression deformity of the   T12 vertebral body  14. LUNG BASES: Small bilateral atelectasis.    IMPRESSION:    Cholelithiasis with mild gallbladder wall thickening. Findings are   indeterminate. Recommend abdominal ultrasound for further evaluation.    Essentially stable splenomegaly.      --- End of Report ---            CHELSEA HOLLOWAY MD; Attending Radiologist  This document has been electronically signed. Aug 28 2023  4:02PM    < end of copied text >      < from: US Abdomen Upper Quadrant Right (09.12.23 @ 10:48) >    ACC: [id number] EXAM:  US ABDOMEN RT UPR QUADRANT   ORDERED BY: MARIBEL COLON     PROCEDURE DATE:  09/12/2023          INTERPRETATION:  CLINICAL INFORMATION: Right upper quadrant pain    COMPARISON: Abdominal ultrasound 8/28/2023, CT abdomen and pelvis   8/20/2023    TECHNIQUE: Sonography of the right upper quadrant.    FINDINGS:  Liver: Increased echogenicity.  Bile ducts: Normal caliber. Common bile duct measures 7 mm.  Gallbladder: Cholelithiasis with gallbladder sludge visualized. Mildly   distended gallbladder wall. No sonographic Garcia sign or gallbladder   distention.  Pancreas: Pancreatic duct dilatation measuring 4.2 mm.  Right kidney: 12.4 cm. No hydronephrosis. Upper pole cyst measuring 3.4   cm.  Ascites: None.  IVC: Visualizedportions are within normal limits.    IMPRESSION:  Cholelithiasis with gallbladder sludge visualized.No sonographic Garcia   sign or gallbladder distention.    Since 8/28/2023, there is interval pancreatic duct dilatation.  Hepatic steatosis.    --- End of Report ---          SETH ZHANG MD; Resident Radiologist  This document has been electronically signed.  CHRISTIANA GARCIA MD; Attending Interventional Radiologist  This document has been electronically signed. Sep 12 2023 11:12AM    < end of copied text >      < from: CT Abdomen and Pelvis w/ IV Cont (09.12.23 @ 11:04) >  Technique: Multidetector-row CT images of the abdomen and pelvis were   obtained from the xiphoid through the symphysis pubis following the   administration of intravenous contrast. No oral contrast was   administered.  Coronal and sagittal reconstructions were performed.    Contrast: 95 cc Omnipaque 350 non-ionic intravenouscontrast.    Comparison: CT abdomen pelvis 8/28/2023. Abdominal ultrasound 9/12/2023    Findings:    01. LIVER: Normal morphology. No focal lesion. Periportal edema is noted   tracking into the left hepatic lobe, may be due to pancreatic   inflammation.  02. SPLEEN: Splenomegaly measuring 14.1 cm (series 6 on image 79),   previously 14.6 cm.  03. PANCREAS: Pancreatic inflammatory changes surrounding the head and   neck. No evidence of pancreatic necrosis or drainable collection. No   definite pancreatic head lesion. No duct dilation. Inflammatory changes   extend to involve the adjacent duodenum and rio hepatis.  04. GALLBLADDER/BILIARY TREE: Cholelithiasis. CBD measures 7 mm which is   within normal limits for age (series 6 on image 60).  05. ADRENALS: Normal.  06. KIDNEYS: Symmetric enhancement. No hydronephrosis or renal stone.   Bilateral renal cysts and hypodensities too small to further characterize.  07. LYMPHADENOPATHY/RETROPERITONEUM: Peripancreatic lymph nodes measuring   up to 1.3 cm (series 301 image 32), likely reactive.  08. BOWEL: No bowel obstruction. Unremarkable appendix.  09. PELVIC VISCERA: Prostate measures 4.6 cm (series 301 image 114).   Unremarkable urinary bladder.  10. PELVIC LYMPH NODES: No lymphadenopathy.  11. VASCULATURE: Normal caliber abdominal aorta. Calcified   atherosclerotic disease of the aorta and its branches.  12. PERITONEUM/ABDOMINAL WALL: No gross ascites  13. SKELETAL: Degenerative changes. Chronic T12 compression deformity.  14. LUNG BASES: Small bilateral atelectasis.    IMPRESSION:    Acute pancreatitis.    No evidence for pancreatic necrosis or drainable collection. No definite   pancreatic head lesion. Recommend continued attention on follow-up.    Additional findings are detailed in the body the report.    < end of copied text >      < from: NM Hepatobiliary Imaging (08.29.23 @ 15:17) >  NO DEFINITE VISUALIZATION OF THE GALLBLADDER THROUGH 4 HOURS   POST-INJECTION, CONSISTENT WITH CHOLECYSTITIS, AS DESCRIBED ABOVE.    CLINICAL CORRELATION IS SUGGESTED.    < end of copied text >

## 2023-09-12 NOTE — ED PROVIDER NOTE - ATTENDING APP SHARED VISIT CONTRIBUTION OF CARE
I have personally performed a history and physical exam on this patient and personally directed the management of the patient.  Patient is a 69-year-old male presents for evaluation of abdominal pain located in the epigastric and left upper side moderate throbbing in nature no fevers no chills history of vomiting patient was recently admitted and discharged from the hospital for gallstones denies any flank pain patient states that he has a history of pancreatitis and this feels similar in the past    On physical exam patient is normocephalic atraumatic pupils equal round react light, nation extraocular muscles intact perfect with sclera clear station bilaterally abdomen tender to palpation in the left upper quadrant no guarding no rebound extremities full range of motion pedal pulses 2+ radial pulses 2+    Assessment plan patient given IV fluids IV pain medicine maintain labs patient has elevated lipase 900 however triglycerides are approximately 200 right upper quadrant reveals dilated pancreatic duct we consulted GI who evaluated patient here in the emergency department advised admission for further evaluation

## 2023-09-12 NOTE — ED PROVIDER NOTE - NSICDXNOPASTSURGICALHX_GEN_ALL_ED
-check ammonia, elevated to 106  -refused S&S, will give rectal lactulose  -dose meds for decreased hepatic function  -prev hx paracentesis in past, unclear etiology of cirrhosis. <-- Click to add NO significant Past Surgical History

## 2023-09-12 NOTE — CONSULT NOTE ADULT - NSCONSULTADDITIONALINFOA_GEN_ALL_CORE
I have seen and examined the patient and I essentially agree with the above assessment and plan.  I will plan to review the cross-sectional imaging studies with radiology.  My 1 concern in this case is pancreatic ductal dilatation and I will follow-up with gastroenterology to determine whether or not any other imaging studies or procedural interventions would be useful prior to consideration of cholecystectomy on this admission.  Thank you very much for allowing me to assist in the care of this patient once again.

## 2023-09-12 NOTE — H&P ADULT - NSHPPHYSICALEXAM_GEN_ALL_CORE
LOS:     VITALS:   T(C): 36.9 (09-12-23 @ 14:30), Max: 36.9 (09-12-23 @ 14:30)  HR: 56 (09-12-23 @ 14:30) (56 - 67)  BP: 178/79 (09-12-23 @ 14:30) (171/78 - 178/79)  RR: 20 (09-12-23 @ 14:30) (18 - 20)  SpO2: 100% (09-12-23 @ 14:30) (99% - 100%)    GENERAL: NAD, lying in bed comfortably  HEAD:  Atraumatic, Normocephalic  EYES: EOMI, PERRLA, conjunctiva and sclera clear  ENT: Moist mucous membranes  NECK: Supple, No JVD  CHEST/LUNG: Clear to auscultation bilaterally; No rales, rhonchi, wheezing, or rubs. Unlabored respirations  HEART: Regular rate and rhythm; No murmurs, rubs, or gallops  ABDOMEN: BSx4; Soft, nontender, nondistended  EXTREMITIES:  2+ Peripheral Pulses, brisk capillary refill. No clubbing, cyanosis, or edema  NERVOUS SYSTEM:  A&Ox3, no focal deficits   SKIN: No rashes or lesions LOS:     VITALS:   T(C): 36.9 (09-12-23 @ 14:30), Max: 36.9 (09-12-23 @ 14:30)  HR: 56 (09-12-23 @ 14:30) (56 - 67)  BP: 178/79 (09-12-23 @ 14:30) (171/78 - 178/79)  RR: 20 (09-12-23 @ 14:30) (18 - 20)  SpO2: 100% (09-12-23 @ 14:30) (99% - 100%)    GENERAL: NAD, lying in bed comfortably, irritable   HEAD:  Atraumatic, Normocephalic  EYES: EOMI, PERRLA, conjunctiva and sclera clear  ENT: Moist mucous membranes  NECK: Supple, No JVD  CHEST/LUNG: Clear to auscultation bilaterally; No rales, rhonchi, wheezing, or rubs. Unlabored respirations  HEART: Regular rate and rhythm; No murmurs, rubs, or gallops  ABDOMEN: BSx4; Soft, nondistended, tender to palpation in RUQ and epigastrum   EXTREMITIES:  2+ Peripheral Pulses, brisk capillary refill. No clubbing, cyanosis, or edema  NERVOUS SYSTEM:  A&Ox3, no focal deficits   SKIN: No rashes or lesions

## 2023-09-12 NOTE — ED PROVIDER NOTE - OBJECTIVE STATEMENT
Patient presents in ED for epigastric pain vomiting several times last night, H/o gallstones and pancreatitis. DC'd from hospital 1 week ago with new onset a fib and infected gallbladder,

## 2023-09-12 NOTE — ED ADULT NURSE NOTE - PRO INTERPRETER NEED 2
Quality 431: Preventive Care And Screening: Unhealthy Alcohol Use - Screening: Patient screened for unhealthy alcohol use using a single question and scores less than 2 times per year Quality 110: Preventive Care And Screening: Influenza Immunization: Influenza Immunization Ordered or Recommended, but not Administered due to system reason Quality 111:Pneumonia Vaccination Status For Older Adults: Pneumococcal Vaccination not Administered or Previously Received, Reason not Otherwise Specified Detail Level: Detailed Quality 226: Preventive Care And Screening: Tobacco Use: Screening And Cessation Intervention: Patient screened for tobacco use and is an ex/non-smoker Quality 130: Documentation Of Current Medications In The Medical Record: Current Medications Documented English

## 2023-09-12 NOTE — CONSULT NOTE ADULT - SUBJECTIVE AND OBJECTIVE BOX
General Surgical Attending:  Dr Marcelino        Pt's Gastroenterologist:  Service.  Last Colonoscopy:  N/A  Last EGD:  N/A      HPI:  71yo Male with PMHx of DM2, HLD, Afib who presents with worsening RUQ and epigastric pain. Pt originally admitted to [facility] from 8/28-/31 for acute cholecystitis. Surgery was consulted and was planning on a Lap idania, however, course was complicated by new Afib with RVR which caused his surgery to get delayed for medical optimization. Patient declined to stay for further optimization prior to surgery and after shared-decision making, opted to pursue outpatient follow up with his outpatient providers. He reports feeling well up until day prior to presentation when pain in the RUQ, epigastrum returned. Descibes the pain as a 9/10, "tightness" radiating to his back associated with loss of appetite and dry heaves. Denies fever, chills, chest pain, SOB, nausea, vomiting, diarrhea, constipation.     In the ED, afebrile, hypertensive to SBP 170s, otherwise hemodynamically stable. Labs were notable for WBC 16, Lipase 957, Lactate 2.7. RUQUS with cholelithiasis and gallbladder sludge with interval development of pancreatic duct dilation. CT A/P showed acute pancreatitis with no necrosis or fluid collection. Admitted to medicine for further management.       PAST MEDICAL & SURGICAL HISTORY:  High cholesterol      Elevated cholesterol with high triglycerides      Pancreatitis      Acid reflux      BP (high blood pressure)      No significant past surgical history          MEDICATIONS  (STANDING):  atorvastatin 40 milliGRAM(s) Oral at bedtime  dextrose 5%. 1000 milliLiter(s) (50 mL/Hr) IV Continuous <Continuous>  dextrose 5%. 1000 milliLiter(s) (100 mL/Hr) IV Continuous <Continuous>  dextrose 50% Injectable 25 Gram(s) IV Push once  dextrose 50% Injectable 12.5 Gram(s) IV Push once  dextrose 50% Injectable 25 Gram(s) IV Push once  gabapentin 100 milliGRAM(s) Oral at bedtime  glucagon  Injectable 1 milliGRAM(s) IntraMuscular once  influenza  Vaccine (HIGH DOSE) 0.7 milliLiter(s) IntraMuscular once  insulin lispro (ADMELOG) corrective regimen sliding scale   SubCutaneous every 6 hours  lactated ringers. 1000 milliLiter(s) (150 mL/Hr) IV Continuous <Continuous>  metoprolol succinate ER 25 milliGRAM(s) Oral daily  pantoprazole    Tablet 40 milliGRAM(s) Oral before breakfast    MEDICATIONS  (PRN):  acetaminophen     Tablet .. 650 milliGRAM(s) Oral every 6 hours PRN Temp greater or equal to 38C (100.4F), Mild Pain (1 - 3)  aluminum hydroxide/magnesium hydroxide/simethicone Suspension 30 milliLiter(s) Oral every 4 hours PRN Dyspepsia  dextrose Oral Gel 15 Gram(s) Oral once PRN Blood Glucose LESS THAN 70 milliGRAM(s)/deciliter  melatonin 3 milliGRAM(s) Oral at bedtime PRN Insomnia  morphine  - Injectable 4 milliGRAM(s) IV Push every 4 hours PRN Severe Pain (7 - 10)  ondansetron Injectable 4 milliGRAM(s) IV Push every 8 hours PRN Nausea and/or Vomiting      Allergies    No Known Allergies    Intolerances        SOCIAL HISTORY:  Etoh:                                    Drugs:                                   Smoke:      Vital Signs Last 24 Hrs  T(C): 36.9 (12 Sep 2023 14:30), Max: 36.9 (12 Sep 2023 14:30)  T(F): 98.5 (12 Sep 2023 14:30), Max: 98.5 (12 Sep 2023 14:30)  HR: 56 (12 Sep 2023 14:30) (56 - 67)  BP: 178/79 (12 Sep 2023 14:30) (171/78 - 178/79)  BP(mean): --  RR: 20 (12 Sep 2023 14:30) (18 - 20)  SpO2: 100% (12 Sep 2023 14:30) (99% - 100%)    Parameters below as of 12 Sep 2023 14:30  Patient On (Oxygen Delivery Method): room air        I&O's Summary      Physical Exam:  General: NAD    Abdominal: +BS, Soft, ND/NT, no organomegaly, no rebound, no guarding.    HEENT: NC/AT, EOMI, normal hearing, no oral lesions, no LAD, neck supple  Pulmonary: normal resp effort, CTA-B  Cardiovascular: NSR, no murmurs  Extremities: WWP, normal strength, no clubbing/cyanosis/edema  Neuro: A/O x 3, CNs II-XII grossly intact, normal sensation, no focal deficits  Pulses: palpable distal pulses    Lines/drains/tubes:    LABS:                        14.0   16.44 )-----------( 299      ( 12 Sep 2023 09:45 )             42.5     09-12    139  |  99  |  13  ----------------------------<  125<H>  4.3   |  28  |  0.7    Ca    10.8<H>      12 Sep 2023 09:45    TPro  6.9  /  Alb  4.6  /  TBili  0.8  /  DBili  x   /  AST  24  /  ALT  34  /  AlkPhos  40  09-12    PT/INR - ( 12 Sep 2023 09:45 )   PT: 14.20 sec;   INR: 1.24 ratio         PTT - ( 12 Sep 2023 09:45 )  PTT:35.6 sec  Urinalysis Basic - ( 12 Sep 2023 09:45 )    Color: x / Appearance: x / SG: x / pH: x  Gluc: 125 mg/dL / Ketone: x  / Bili: x / Urobili: x   Blood: x / Protein: x / Nitrite: x   Leuk Esterase: x / RBC: x / WBC x   Sq Epi: x / Non Sq Epi: x / Bacteria: x      CAPILLARY BLOOD GLUCOSE      POCT Blood Glucose.: 96 mg/dL (12 Sep 2023 17:49)    LIVER FUNCTIONS - ( 12 Sep 2023 09:45 )  Alb: 4.6 g/dL / Pro: 6.9 g/dL / ALK PHOS: 40 U/L / ALT: 34 U/L / AST: 24 U/L / GGT: x             RADIOLOGY & ADDITIONAL STUDIES:  < from: CT Abdomen and Pelvis w/ IV Cont (09.12.23 @ 11:04) >    ACC: [id number] EXAM:  CT ABDOMEN AND PELVIS IC   ORDERED BY: MARIBEL COLON     PROCEDURE DATE:  09/12/2023          INTERPRETATION:  Clinical Indication: Abdominal pain.    Technique: Multidetector-row CT images of the abdomen and pelvis were   obtained from the xiphoid through the symphysis pubis following the   administration of intravenous contrast. No oral contrast was   administered.  Coronal and sagittal reconstructions were performed.    Contrast: 95 cc Omnipaque 350 non-ionic intravenouscontrast.    Comparison: CT abdomen pelvis 8/28/2023. Abdominal ultrasound 9/12/2023    Findings:    01. LIVER: Normal morphology. No focal lesion. Periportal edema is noted   tracking into the left hepatic lobe, may be due to pancreatic   inflammation.  02. SPLEEN: Splenomegaly measuring 14.1 cm (series 6 on image 79),   previously 14.6 cm.  03. PANCREAS: Pancreatic inflammatory changes surrounding the head and   neck. No evidence of pancreatic necrosis or drainable collection. No   definite pancreatic head lesion. No duct dilation. Inflammatory changes   extend to involve the adjacent duodenum and rio hepatis.  04. GALLBLADDER/BILIARY TREE: Cholelithiasis. CBD measures 7 mm which is   within normal limits for age (series 6 on image 60).  05. ADRENALS: Normal.  06. KIDNEYS: Symmetric enhancement. No hydronephrosis or renal stone.   Bilateral renal cysts and hypodensities too small to further characterize.  07. LYMPHADENOPATHY/RETROPERITONEUM: Peripancreatic lymph nodes measuring   up to 1.3 cm (series 301 image 32), likely reactive.  08. BOWEL: No bowel obstruction. Unremarkable appendix.  09. PELVIC VISCERA: Prostate measures 4.6 cm (series 301 image 114).   Unremarkable urinary bladder.  10. PELVIC LYMPH NODES: No lymphadenopathy.  11. VASCULATURE: Normal caliber abdominal aorta. Calcified   atherosclerotic disease of the aorta and its branches.  12. PERITONEUM/ABDOMINAL WALL: No gross ascites  13. SKELETAL: Degenerative changes. Chronic T12 compression deformity.  14. LUNG BASES: Small bilateral atelectasis.    IMPRESSION:    Acute pancreatitis.    No evidence for pancreatic necrosis or drainable collection. No definite   pancreatic head lesion. Recommend continued attention on follow-up.    Additional findings are detailed in the body the report.    --- End of Report ---    CHELSEA HOLLOWAY MD; Attending Radiologist  This document has been electronically signed. Sep 12 2023  1:20PM    < end of copied text >    < from: US Abdomen Upper Quadrant Right (09.12.23 @ 10:48) >    ACC: [id number] EXAM:  US ABDOMEN RT UPR QUADRANT   ORDERED BY: MARIBEL COLON     PROCEDURE DATE:  09/12/2023          INTERPRETATION:  CLINICAL INFORMATION: Right upper quadrant pain    COMPARISON: Abdominal ultrasound 8/28/2023, CT abdomen and pelvis   8/20/2023    TECHNIQUE: Sonography of the right upper quadrant.    FINDINGS:  Liver: Increased echogenicity.  Bile ducts: Normal caliber. Common bile duct measures 7 mm.  Gallbladder: Cholelithiasis with gallbladder sludge visualized. Mildly   distended gallbladder wall. No sonographic Garcia sign or gallbladder   distention.  Pancreas: Pancreatic duct dilatation measuring 4.2 mm.  Right kidney: 12.4 cm. No hydronephrosis. Upper pole cyst measuring 3.4   cm.  Ascites: None.  IVC: Visualizedportions are within normal limits.    IMPRESSION:  Cholelithiasis with gallbladder sludge visualized.No sonographic Garcia   sign or gallbladder distention.    Since 8/28/2023, there is interval pancreatic duct dilatation.  Hepatic steatosis.    --- End of Report ---    SETH ZHANG MD; Resident Radiologist  This document has been electronically signed.  CHRISTIANA GARCIA MD; Attending Interventional Radiologist  This document has been electronically signed. Sep 12 2023 11:12AM    < end of copied text >                                                                                             General Surgical Attending:  Dr Marcelino        Pt's Gastroenterologist:  Service.  Last Colonoscopy:  N/A  Last EGD:  N/A      HPI:  73yo Male with PMHx of DM2, HLD, Afib who presents with worsening RUQ and epigastric pain. Pt previously admitted to [facility] from 8/28-/31 for acute cholecystitis. Surgery was consulted and was planning on a Lap idania, however, course was complicated by new Afib with RVR which caused his surgery be delayed for medical optimization. Patient declined to stay for further optimization prior to surgery and after shared-decision making, opted to pursue outpatient follow up with his outpatient providers. He reports feeling well up until day prior to presentation when pain in the RUQ, epigastrum returned. Descibes the pain as a 9/10, "tightness" radiating to his back associated with loss of appetite and dry heaves. Denies fever, chills, chest pain, SOB, nausea, vomiting, diarrhea, constipation.     In the ED, afebrile, hypertensive to SBP 170s, otherwise hemodynamically stable. Labs were notable for WBC 16, Lipase 957, Lactate 2.7. RUQUS with cholelithiasis and gallbladder sludge with interval development of pancreatic duct dilation. CT A/P showed acute pancreatitis with no necrosis or fluid collection. Admitted to medicine for further management.       PAST MEDICAL & SURGICAL HISTORY:  High cholesterol      Elevated cholesterol with high triglycerides      Pancreatitis      Acid reflux      BP (high blood pressure)      No significant past surgical history          MEDICATIONS  (STANDING):  atorvastatin 40 milliGRAM(s) Oral at bedtime  dextrose 5%. 1000 milliLiter(s) (50 mL/Hr) IV Continuous <Continuous>  dextrose 5%. 1000 milliLiter(s) (100 mL/Hr) IV Continuous <Continuous>  dextrose 50% Injectable 25 Gram(s) IV Push once  dextrose 50% Injectable 12.5 Gram(s) IV Push once  dextrose 50% Injectable 25 Gram(s) IV Push once  gabapentin 100 milliGRAM(s) Oral at bedtime  glucagon  Injectable 1 milliGRAM(s) IntraMuscular once  influenza  Vaccine (HIGH DOSE) 0.7 milliLiter(s) IntraMuscular once  insulin lispro (ADMELOG) corrective regimen sliding scale   SubCutaneous every 6 hours  lactated ringers. 1000 milliLiter(s) (150 mL/Hr) IV Continuous <Continuous>  metoprolol succinate ER 25 milliGRAM(s) Oral daily  pantoprazole    Tablet 40 milliGRAM(s) Oral before breakfast    MEDICATIONS  (PRN):  acetaminophen     Tablet .. 650 milliGRAM(s) Oral every 6 hours PRN Temp greater or equal to 38C (100.4F), Mild Pain (1 - 3)  aluminum hydroxide/magnesium hydroxide/simethicone Suspension 30 milliLiter(s) Oral every 4 hours PRN Dyspepsia  dextrose Oral Gel 15 Gram(s) Oral once PRN Blood Glucose LESS THAN 70 milliGRAM(s)/deciliter  melatonin 3 milliGRAM(s) Oral at bedtime PRN Insomnia  morphine  - Injectable 4 milliGRAM(s) IV Push every 4 hours PRN Severe Pain (7 - 10)  ondansetron Injectable 4 milliGRAM(s) IV Push every 8 hours PRN Nausea and/or Vomiting      Allergies    No Known Allergies    Intolerances        SOCIAL HISTORY:  Etoh:                                    Drugs:                                   Smoke:      Vital Signs Last 24 Hrs  T(C): 36.9 (12 Sep 2023 14:30), Max: 36.9 (12 Sep 2023 14:30)  T(F): 98.5 (12 Sep 2023 14:30), Max: 98.5 (12 Sep 2023 14:30)  HR: 56 (12 Sep 2023 14:30) (56 - 67)  BP: 178/79 (12 Sep 2023 14:30) (171/78 - 178/79)  BP(mean): --  RR: 20 (12 Sep 2023 14:30) (18 - 20)  SpO2: 100% (12 Sep 2023 14:30) (99% - 100%)    Parameters below as of 12 Sep 2023 14:30  Patient On (Oxygen Delivery Method): room air        I&O's Summary      Physical Exam:  General: NAD    Abdominal: +BS, Soft, ND/NT, no organomegaly, no rebound, no guarding.    HEENT: NC/AT, EOMI, normal hearing, no oral lesions, no LAD, neck supple  Pulmonary: normal resp effort, CTA-B  Cardiovascular: NSR, no murmurs  Extremities: WWP, normal strength, no clubbing/cyanosis/edema  Neuro: A/O x 3, CNs II-XII grossly intact, normal sensation, no focal deficits  Pulses: palpable distal pulses    Lines/drains/tubes:    LABS:                        14.0   16.44 )-----------( 299      ( 12 Sep 2023 09:45 )             42.5     09-12    139  |  99  |  13  ----------------------------<  125<H>  4.3   |  28  |  0.7    Ca    10.8<H>      12 Sep 2023 09:45    TPro  6.9  /  Alb  4.6  /  TBili  0.8  /  DBili  x   /  AST  24  /  ALT  34  /  AlkPhos  40  09-12    PT/INR - ( 12 Sep 2023 09:45 )   PT: 14.20 sec;   INR: 1.24 ratio         PTT - ( 12 Sep 2023 09:45 )  PTT:35.6 sec  Urinalysis Basic - ( 12 Sep 2023 09:45 )    Color: x / Appearance: x / SG: x / pH: x  Gluc: 125 mg/dL / Ketone: x  / Bili: x / Urobili: x   Blood: x / Protein: x / Nitrite: x   Leuk Esterase: x / RBC: x / WBC x   Sq Epi: x / Non Sq Epi: x / Bacteria: x      CAPILLARY BLOOD GLUCOSE      POCT Blood Glucose.: 96 mg/dL (12 Sep 2023 17:49)    LIVER FUNCTIONS - ( 12 Sep 2023 09:45 )  Alb: 4.6 g/dL / Pro: 6.9 g/dL / ALK PHOS: 40 U/L / ALT: 34 U/L / AST: 24 U/L / GGT: x             RADIOLOGY & ADDITIONAL STUDIES:  < from: CT Abdomen and Pelvis w/ IV Cont (09.12.23 @ 11:04) >    ACC: [id number] EXAM:  CT ABDOMEN AND PELVIS IC   ORDERED BY: MARIBEL COLON     PROCEDURE DATE:  09/12/2023          INTERPRETATION:  Clinical Indication: Abdominal pain.    Technique: Multidetector-row CT images of the abdomen and pelvis were   obtained from the xiphoid through the symphysis pubis following the   administration of intravenous contrast. No oral contrast was   administered.  Coronal and sagittal reconstructions were performed.    Contrast: 95 cc Omnipaque 350 non-ionic intravenouscontrast.    Comparison: CT abdomen pelvis 8/28/2023. Abdominal ultrasound 9/12/2023    Findings:    01. LIVER: Normal morphology. No focal lesion. Periportal edema is noted   tracking into the left hepatic lobe, may be due to pancreatic   inflammation.  02. SPLEEN: Splenomegaly measuring 14.1 cm (series 6 on image 79),   previously 14.6 cm.  03. PANCREAS: Pancreatic inflammatory changes surrounding the head and   neck. No evidence of pancreatic necrosis or drainable collection. No   definite pancreatic head lesion. No duct dilation. Inflammatory changes   extend to involve the adjacent duodenum and rio hepatis.  04. GALLBLADDER/BILIARY TREE: Cholelithiasis. CBD measures 7 mm which is   within normal limits for age (series 6 on image 60).  05. ADRENALS: Normal.  06. KIDNEYS: Symmetric enhancement. No hydronephrosis or renal stone.   Bilateral renal cysts and hypodensities too small to further characterize.  07. LYMPHADENOPATHY/RETROPERITONEUM: Peripancreatic lymph nodes measuring   up to 1.3 cm (series 301 image 32), likely reactive.  08. BOWEL: No bowel obstruction. Unremarkable appendix.  09. PELVIC VISCERA: Prostate measures 4.6 cm (series 301 image 114).   Unremarkable urinary bladder.  10. PELVIC LYMPH NODES: No lymphadenopathy.  11. VASCULATURE: Normal caliber abdominal aorta. Calcified   atherosclerotic disease of the aorta and its branches.  12. PERITONEUM/ABDOMINAL WALL: No gross ascites  13. SKELETAL: Degenerative changes. Chronic T12 compression deformity.  14. LUNG BASES: Small bilateral atelectasis.    IMPRESSION:    Acute pancreatitis.    No evidence for pancreatic necrosis or drainable collection. No definite   pancreatic head lesion. Recommend continued attention on follow-up.    Additional findings are detailed in the body the report.    --- End of Report ---    CHELSEA HOLLOWAY MD; Attending Radiologist  This document has been electronically signed. Sep 12 2023  1:20PM    < end of copied text >    < from: US Abdomen Upper Quadrant Right (09.12.23 @ 10:48) >    ACC: [id number] EXAM:  US ABDOMEN RT UPR QUADRANT   ORDERED BY: MARIBEL COLON     PROCEDURE DATE:  09/12/2023          INTERPRETATION:  CLINICAL INFORMATION: Right upper quadrant pain    COMPARISON: Abdominal ultrasound 8/28/2023, CT abdomen and pelvis   8/20/2023    TECHNIQUE: Sonography of the right upper quadrant.    FINDINGS:  Liver: Increased echogenicity.  Bile ducts: Normal caliber. Common bile duct measures 7 mm.  Gallbladder: Cholelithiasis with gallbladder sludge visualized. Mildly   distended gallbladder wall. No sonographic Garcia sign or gallbladder   distention.  Pancreas: Pancreatic duct dilatation measuring 4.2 mm.  Right kidney: 12.4 cm. No hydronephrosis. Upper pole cyst measuring 3.4   cm.  Ascites: None.  IVC: Visualizedportions are within normal limits.    IMPRESSION:  Cholelithiasis with gallbladder sludge visualized.No sonographic Garcia   sign or gallbladder distention.    Since 8/28/2023, there is interval pancreatic duct dilatation.  Hepatic steatosis.    --- End of Report ---    SETH ZHANG MD; Resident Radiologist  This document has been electronically signed.  CHRISTIANA GARCIA MD; Attending Interventional Radiologist  This document has been electronically signed. Sep 12 2023 11:12AM    < end of copied text >

## 2023-09-12 NOTE — PATIENT PROFILE ADULT - NS PRO AD NO ADVANCE DIRECTIVE
verbalizes understanding/demonstrates understanding of teaching/good return demonstration/needs met No

## 2023-09-12 NOTE — CONSULT NOTE ADULT - CONSULT REASON
Cholelithiasis.  (Pt previously seen for condition last admission, 8/2023) Cholelithiasis.  (Pt previously seen for Cholecystitis managed medically on last admission, 8/2023)

## 2023-09-12 NOTE — PATIENT PROFILE ADULT - FALL HARM RISK - CONCLUSION
Progress note:   Family meeting today with the patient and both of his parents.  The meeting lasted about 1 hour.     During this meeting, the patient denied any current suicidal or homicidal ideation.  In addition, the patient was able to contract that if SI or HI does occur un the future, he will tell his parents or therapist, and return to a hospital iff needed, rather than act on the thoughts.     Patient did admit that some paranoid thoughts are still present:    A documented at admission, patient believes he has been followed/harrased by a homeless individual at his job.  Mansoor has some fears that this individual may try to follow the patient to his home.  However, patient said he is able to dismiss these fears fairly easily.    Patient admitted to cannabis use prior to admission.  He made a commitment to leading a cannabis and alcohol free lifestyle after his discharge.     Both parents said that they feel the patient sounded much better today  than on the day of admission.     Patient  will continue with his therapist \"Baltazar\"  And with Dr. Levine after his discharge.    Universal Safety Interventions

## 2023-09-12 NOTE — ED PROVIDER NOTE - CLINICAL SUMMARY MEDICAL DECISION MAKING FREE TEXT BOX
n patient given IV fluids IV pain medicine maintain labs patient has elevated lipase 900 however triglycerides are approximately 200 right upper quadrant reveals dilated pancreatic duct we consulted GI who evaluated patient here in the emergency department advised admission for further evaluation

## 2023-09-12 NOTE — H&P ADULT - HISTORY OF PRESENT ILLNESS
71yo Male with PMHx of DM2, HLD, Afib who presents with worsening RUQ and epigastric pain. Pt originally admitted to [facility] from 8/28-/31 for acute cholecystitis. Surgery was consulted and was planning on a Lap idania, however, course was complicated by new Afib with RVR which caused his surgery to get delayed for medical optimization. Patient declined to stay for further optimization prior to surgery and after shared-decision making, opted to pursue outpatient follow up with his outpatient providers. He reports feeling well up until day prior to presentation when pain in the RUQ, epigastrum returned. Descibes the pain as a 9/10, "tightness" radiating to his back associated with loss of appetite and dry heaves. Denies fever, chills, chest pain, SOB, nausea, vomiting, diarrhea, constipation.     In the ED, afebrile, hypertensive to SBP 170s, otherwise hemodynamically stable. Labs were notable for WBC 16, Lipase 957, Lactate 2.7. RUQUS with cholelithiasis and gallbladder sludge with interval development of pancreatic duct dilation. CT A/P showed acute pancreatitis with no necrosis or fluid collection. Admitted to medicine for further management.

## 2023-09-12 NOTE — H&P ADULT - ASSESSMENT
71yo Male with PMHx of DM2, HLD, Afib who presents with worsening RUQ and epigastric pain and found to have acute gallstone pancreatitis. Will need lap idania at some point, but requires medical optimization prior. Surgery consulted for lap idania planning. In the interim, will keep NPO/CLD, IVF, IV pain meds, and discuss any further testing required.     #Acute Gallstone Pancreatitis   - Keep NPO   - IVF w/ LR   - IV morphine 4mg q4h prn   - Surgery consult     #Recent dx of Afib   - Currently rate controlled  - Cont Metop XL 25mg and ELiquis 5mg BID >> discuss timing of when to hold eliquis for surgery     #DM2  - ISS   - hold metformin and pioglitazone     #HLD   - Cont atorva 40mg 71yo Male with PMHx of DM2, HLD, Afib who presents with worsening RUQ and epigastric pain and found to have acute gallstone pancreatitis. Will need lap idania at some point, but requires medical optimization prior. Surgery consulted for lap idania planning. In the interim, will keep NPO/CLD, IVF, IV pain meds, and discuss any further testing required.     #Acute Gallstone Pancreatitis   - Keep NPO   - IVF w/ LR   - IV morphine 4mg q4h prn   - Zofran PRN  - Surgery consult     #Recent dx of Afib   - Currently rate controlled  - Cont Metop XL 25mg and ELiquis 5mg BID >> discuss timing of when to hold eliquis for surgery     #DM2  - ISS   - hold metformin and pioglitazone     #HLD   - Cont atorva 40mg

## 2023-09-12 NOTE — CONSULT NOTE ADULT - ASSESSMENT
Assessment:         71yo Male with PMHx of DM2, HLD, Afib who presents with worsening RUQ and epigastric pain. Pt originally admitted to [facility] from 8/28-/31 for acute cholecystitis. Surgery was consulted and was planning on a Lap idania, however, course was complicated by new Afib with RVR which caused his surgery to get delayed for medical optimization. Patient declined to stay for further optimization prior to surgery and after shared-decision making, opted to pursue outpatient follow up with his outpatient providers. He reports feeling well up until day prior to presentation when pain in the RUQ, epigastrum returned. Descibes the pain as a 9/10, "tightness" radiating to his back associated with loss of appetite and dry heaves. Denies fever, chills, chest pain, SOB, nausea, vomiting, diarrhea, constipation.   In the ED, afebrile, hypertensive to SBP 170s, otherwise hemodynamically stable. Labs were notable for WBC 16, Lipase 957, Lactate 2.7. RUQUS with cholelithiasis and gallbladder sludge with interval development of pancreatic duct dilation. CT A/P showed acute pancreatitis with no necrosis or fluid collection. Admitted to medicine for further management.          Recommendations:  - NPO  - IVF  - pain management  - emesis management  - trend labs

## 2023-09-12 NOTE — CONSULT NOTE ADULT - ASSESSMENT
72yMale pmh high cholesterol, BP, known gallstones, past history of pancreatitis presents for epigastric pain.     Problem 1-  Gallstone pancreatitis   Cholelithiasis with gallbladder sludge visualized. No sonographic Garcia   sign or gallbladder distention.  Since 8/28/2023, there is interval pancreatic duct dilatation.  Rec  -Keep Patient NPO  -RUQ Abdominal Ultrasound appreciated  -cn repeat CT scan   -Aggressive IV hydration with lactated ringers, check daily weights   -Strict Is and Os  -Check BUN and HCT daily goal is that both should go down  -IF BUN or HCT rise, please increase fluid  -MRI with and without gadolinium as an outpatient to rule out Pancreas divisum and Pancreatic neoplasm      Problem 2-Hepatic steatosis  Rec  -Dietary and lifestyle modifications advised   -outpatient fibroscan   -outpatient can check acute hepatitis panel and ceruloplasmin   -outpatient workup 72yMale pmh high cholesterol, BP, known gallstones, past history of pancreatitis presents for epigastric pain.     Problem 1-Gallstone pancreatitis   Cholelithiasis with gallbladder sludge visualized. No sonographic Garcia   sign or gallbladder distention.  Since 8/28/2023, there is interval pancreatic duct dilatation 4.2mm PD  Rec  -Keep Patient NPO  -RUQ Abdominal Ultrasound appreciated  -can repeat CT scan   -Aggressive IV hydration with lactated ringers, check daily weights   -Strict Is and Os  -Check BUN and HCT daily goal is that both should go down  -IF BUN or HCT rise, please increase fluid  -MRI with and without gadolinium as an outpatient to rule out Pancreas divisum and Pancreatic neoplasm      Problem 2-Hepatic steatosis  Rec  -Dietary and lifestyle modifications advised   -outpatient fibroscan   -outpatient can check acute hepatitis panel and ceruloplasmin   -outpatient workup 72yMale pmh high cholesterol, BP, known gallstones, past history of pancreatitis presents for epigastric pain.     Problem 1-Gallstone pancreatitis   2/3 elevated lipase, epigastric pain.  Cholelithiasis with gallbladder sludge visualized. No sonographic Garcia   sign or gallbladder distention.  Since 8/28/2023, there is interval pancreatic duct dilatation 4.2mm PD  Rec  -Keep Patient NPO  -RUQ Abdominal Ultrasound appreciated  -can repeat CT scan   -Aggressive IV hydration with lactated ringers, check daily weights   -Strict Is and Os  -Check BUN and HCT daily goal is that both should go down  -IF BUN or HCT rise, please increase fluid  -MRI with and without gadolinium as an outpatient to rule out Pancreas divisum and Pancreatic neoplasm      Problem 2-Hepatic steatosis  Rec  -Dietary and lifestyle modifications advised   -outpatient fibroscan   -outpatient can check acute hepatitis panel and ceruloplasmin   -outpatient workup 72yMale pmh DL, DM2, heavy smoker (2-3 PPD), GERD, known gallstones, and history of pancreatitis, here for abd pain, being evaluated for pancreatitis.    Problem 1- acute interstitial pancreatitis - likely gallstone related vs other etiology - less likely drug induced (pt denied OTC or herbals or new meds that could explain pancreatitis) vs panc divisum malignancy vs less likely alcohol (pt denied)  3/3 elevated lipase, epigastric pain, imaging findings on CT  US w/ Cholelithiasis with gallbladder sludge visualized. No sonographic Garcia sign or gallbladder distention.  he had a recent HIDA that was concerning for acute cholecystitis    Since 8/28/2023, there is interval pancreatic duct dilatation 4.2mm PD  Rec  -Keep Patient NPO  -CT and RUQ Abdominal Ultrasound appreciated  -NPO for now  -Aggressive IV hydration with lactated ringers, check daily weights   recommend bolus of 10 ml / kg x 1 followed by 1.5 ml/kg/hr maintenance therapy for now  -Strict Is and Os  -Check BUN and HCT daily goal is that both should go down  -IF BUN or HCT rise, please increase fluid  -if clinically improved tomorrow, can advance to clear liquid diet  -EUS vs. MRI panc protocol in 8 weeks as an outpatient to rule out Pancreas divisum and Pancreatic neoplasm  -surgery evaluation for cholecystectomy preferably prior to discharge to prevent future presentations from symptomatic gallstones    Problem 2-Hepatic steatosis  Rec  -Dietary and lifestyle modifications advised   -outpatient fibroscan   -outpatient can check hepatitis panel  -outpatient workup

## 2023-09-12 NOTE — PATIENT PROFILE ADULT - FUNCTIONAL ASSESSMENT - BASIC MOBILITY 6.
4-calculated by average/Not able to assess (calculate score using [facility] averaging method)

## 2023-09-12 NOTE — H&P ADULT - NSHPLABSRESULTS_GEN_ALL_CORE
14.0   16.44 )-----------( 299      ( 12 Sep 2023 09:45 )             42.5   09-12    139  |  99  |  13  ----------------------------<  125<H>  4.3   |  28  |  0.7    Ca    10.8<H>      12 Sep 2023 09:45    TPro  6.9  /  Alb  4.6  /  TBili  0.8  /  DBili  x   /  AST  24  /  ALT  34  /  AlkPhos  40  09-12        ACC: [id number] EXAM:  CT ABDOMEN AND PELVIS IC   ORDERED BY: MARIBEL COLON     PROCEDURE DATE:  09/12/2023          INTERPRETATION:  Clinical Indication: Abdominal pain.    Technique: Multidetector-row CT images of the abdomen and pelvis were   obtained from the xiphoid through the symphysis pubis following the   administration of intravenous contrast. No oral contrast was   administered.  Coronal and sagittal reconstructions were performed.    Contrast: 95 cc Omnipaque 350 non-ionic intravenous contrast.    Comparison: CT abdomen pelvis 8/28/2023. Abdominal ultrasound 9/12/2023    Findings:    01. LIVER: Normal morphology. No focal lesion. Periportal edema is noted   tracking into the left hepatic lobe, may be due to pancreatic   inflammation.  02. SPLEEN: Splenomegaly measuring 14.1 cm (series 6 on image 79),   previously 14.6 cm.  03. PANCREAS: Pancreatic inflammatory changes surrounding the head and   neck. No evidence of pancreatic necrosis or drainable collection. No   definite pancreatic head lesion. No duct dilation. Inflammatory changes   extend to involve the adjacent duodenum and rio hepatis.  04. GALLBLADDER/BILIARY TREE: Cholelithiasis. CBD measures 7 mm which is   within normal limits for age (series 6 on image 60).  05. ADRENALS: Normal.  06. KIDNEYS: Symmetric enhancement. No hydronephrosis or renal stone.   Bilateral renal cysts and hypodensities too small to further characterize.  07. LYMPHADENOPATHY/RETROPERITONEUM: Peripancreatic lymph nodes measuring   up to 1.3 cm (series 301 image 32), likely reactive.  08. BOWEL: No bowel obstruction. Unremarkable appendix.  09. PELVIC VISCERA: Prostate measures 4.6 cm (series 301 image 114).   Unremarkable urinary bladder.  10. PELVIC LYMPH NODES: No lymphadenopathy.  11. VASCULATURE: Normal caliber abdominal aorta. Calcified   atherosclerotic disease of the aorta and its branches.  12. PERITONEUM/ABDOMINAL WALL: No gross ascites  13. SKELETAL: Degenerative changes. Chronic T12 compression deformity.  14. LUNG BASES: Small bilateral atelectasis.    IMPRESSION:    Acute pancreatitis.    No evidence for pancreatic necrosis or drainable collection. No definite   pancreatic head lesion. Recommend continued attention on follow-up.    Additional findings are detailed in the body the report.

## 2023-09-13 LAB
ALBUMIN SERPL ELPH-MCNC: 4 G/DL — SIGNIFICANT CHANGE UP (ref 3.5–5.2)
ALP SERPL-CCNC: 29 U/L — LOW (ref 30–115)
ALT FLD-CCNC: 25 U/L — SIGNIFICANT CHANGE UP (ref 0–41)
ANION GAP SERPL CALC-SCNC: 11 MMOL/L — SIGNIFICANT CHANGE UP (ref 7–14)
APTT BLD: 37.9 SEC — SIGNIFICANT CHANGE UP (ref 27–39.2)
AST SERPL-CCNC: 13 U/L — SIGNIFICANT CHANGE UP (ref 0–41)
BILIRUB SERPL-MCNC: 0.6 MG/DL — SIGNIFICANT CHANGE UP (ref 0.2–1.2)
BLD GP AB SCN SERPL QL: SIGNIFICANT CHANGE UP
BUN SERPL-MCNC: 8 MG/DL — LOW (ref 10–20)
CALCIUM SERPL-MCNC: 9.9 MG/DL — SIGNIFICANT CHANGE UP (ref 8.4–10.5)
CHLORIDE SERPL-SCNC: 100 MMOL/L — SIGNIFICANT CHANGE UP (ref 98–110)
CO2 SERPL-SCNC: 26 MMOL/L — SIGNIFICANT CHANGE UP (ref 17–32)
CREAT SERPL-MCNC: 0.6 MG/DL — LOW (ref 0.7–1.5)
EGFR: 102 ML/MIN/1.73M2 — SIGNIFICANT CHANGE UP
GLUCOSE BLDC GLUCOMTR-MCNC: 109 MG/DL — HIGH (ref 70–99)
GLUCOSE BLDC GLUCOMTR-MCNC: 91 MG/DL — SIGNIFICANT CHANGE UP (ref 70–99)
GLUCOSE BLDC GLUCOMTR-MCNC: 96 MG/DL — SIGNIFICANT CHANGE UP (ref 70–99)
GLUCOSE SERPL-MCNC: 94 MG/DL — SIGNIFICANT CHANGE UP (ref 70–99)
HCT VFR BLD CALC: 39.5 % — LOW (ref 42–52)
HGB BLD-MCNC: 13 G/DL — LOW (ref 14–18)
INR BLD: 1.46 RATIO — HIGH (ref 0.65–1.3)
LACTATE SERPL-SCNC: 1.9 MMOL/L — SIGNIFICANT CHANGE UP (ref 0.7–2)
LIDOCAIN IGE QN: 799 U/L — HIGH (ref 7–60)
MCHC RBC-ENTMCNC: 28.4 PG — SIGNIFICANT CHANGE UP (ref 27–31)
MCHC RBC-ENTMCNC: 32.9 G/DL — SIGNIFICANT CHANGE UP (ref 32–37)
MCV RBC AUTO: 86.4 FL — SIGNIFICANT CHANGE UP (ref 80–94)
NRBC # BLD: 0 /100 WBCS — SIGNIFICANT CHANGE UP (ref 0–0)
PLATELET # BLD AUTO: 258 K/UL — SIGNIFICANT CHANGE UP (ref 130–400)
PMV BLD: 11.2 FL — HIGH (ref 7.4–10.4)
POTASSIUM SERPL-MCNC: 3.9 MMOL/L — SIGNIFICANT CHANGE UP (ref 3.5–5)
POTASSIUM SERPL-SCNC: 3.9 MMOL/L — SIGNIFICANT CHANGE UP (ref 3.5–5)
PROT SERPL-MCNC: 6 G/DL — SIGNIFICANT CHANGE UP (ref 6–8)
PROTHROM AB SERPL-ACNC: 16.8 SEC — HIGH (ref 9.95–12.87)
RBC # BLD: 4.57 M/UL — LOW (ref 4.7–6.1)
RBC # FLD: 13.2 % — SIGNIFICANT CHANGE UP (ref 11.5–14.5)
SODIUM SERPL-SCNC: 137 MMOL/L — SIGNIFICANT CHANGE UP (ref 135–146)
WBC # BLD: 14.8 K/UL — HIGH (ref 4.8–10.8)
WBC # FLD AUTO: 14.8 K/UL — HIGH (ref 4.8–10.8)

## 2023-09-13 PROCEDURE — 99232 SBSQ HOSP IP/OBS MODERATE 35: CPT

## 2023-09-13 PROCEDURE — 99233 SBSQ HOSP IP/OBS HIGH 50: CPT

## 2023-09-13 RX ORDER — ENOXAPARIN SODIUM 100 MG/ML
90 INJECTION SUBCUTANEOUS EVERY 12 HOURS
Refills: 0 | Status: DISCONTINUED | OUTPATIENT
Start: 2023-09-13 | End: 2023-09-14

## 2023-09-13 RX ORDER — METOPROLOL TARTRATE 50 MG
50 TABLET ORAL DAILY
Refills: 0 | Status: DISCONTINUED | OUTPATIENT
Start: 2023-09-13 | End: 2023-09-14

## 2023-09-13 RX ADMIN — MORPHINE SULFATE 4 MILLIGRAM(S): 50 CAPSULE, EXTENDED RELEASE ORAL at 10:49

## 2023-09-13 RX ADMIN — MORPHINE SULFATE 4 MILLIGRAM(S): 50 CAPSULE, EXTENDED RELEASE ORAL at 20:30

## 2023-09-13 RX ADMIN — GABAPENTIN 100 MILLIGRAM(S): 400 CAPSULE ORAL at 21:08

## 2023-09-13 RX ADMIN — ENOXAPARIN SODIUM 90 MILLIGRAM(S): 100 INJECTION SUBCUTANEOUS at 19:00

## 2023-09-13 RX ADMIN — MORPHINE SULFATE 4 MILLIGRAM(S): 50 CAPSULE, EXTENDED RELEASE ORAL at 20:13

## 2023-09-13 RX ADMIN — MORPHINE SULFATE 4 MILLIGRAM(S): 50 CAPSULE, EXTENDED RELEASE ORAL at 14:59

## 2023-09-13 RX ADMIN — SODIUM CHLORIDE 150 MILLILITER(S): 9 INJECTION, SOLUTION INTRAVENOUS at 06:24

## 2023-09-13 RX ADMIN — MORPHINE SULFATE 4 MILLIGRAM(S): 50 CAPSULE, EXTENDED RELEASE ORAL at 02:00

## 2023-09-13 RX ADMIN — Medication 25 MILLIGRAM(S): at 06:25

## 2023-09-13 RX ADMIN — SODIUM CHLORIDE 150 MILLILITER(S): 9 INJECTION, SOLUTION INTRAVENOUS at 14:57

## 2023-09-13 RX ADMIN — MORPHINE SULFATE 4 MILLIGRAM(S): 50 CAPSULE, EXTENDED RELEASE ORAL at 06:25

## 2023-09-13 RX ADMIN — PANTOPRAZOLE SODIUM 40 MILLIGRAM(S): 20 TABLET, DELAYED RELEASE ORAL at 06:25

## 2023-09-13 RX ADMIN — ATORVASTATIN CALCIUM 40 MILLIGRAM(S): 80 TABLET, FILM COATED ORAL at 21:08

## 2023-09-13 RX ADMIN — APIXABAN 5 MILLIGRAM(S): 2.5 TABLET, FILM COATED ORAL at 06:22

## 2023-09-13 RX ADMIN — MORPHINE SULFATE 4 MILLIGRAM(S): 50 CAPSULE, EXTENDED RELEASE ORAL at 06:49

## 2023-09-13 RX ADMIN — MORPHINE SULFATE 4 MILLIGRAM(S): 50 CAPSULE, EXTENDED RELEASE ORAL at 02:20

## 2023-09-13 NOTE — PROGRESS NOTE ADULT - SUBJECTIVE AND OBJECTIVE BOX
S; Pt stil with LUQ pain, improved but requiring pain meds. Denies N/V/F/C. Afebrile overnight  O; Vital Signs Last 24 Hrs  T(C): 35.8 (13 Sep 2023 14:04), Max: 36.9 (12 Sep 2023 14:30)  T(F): 96.5 (13 Sep 2023 14:04), Max: 98.5 (12 Sep 2023 14:30)  HR: 60 (13 Sep 2023 14:04) (56 - 72)  BP: 179/84 (13 Sep 2023 14:04) (158/74 - 197/78)  BP(mean): --  RR: 16 (13 Sep 2023 14:04) (16 - 20)  SpO2: 97% (13 Sep 2023 14:04) (97% - 100%)    Parameters below as of 13 Sep 2023 14:04  Patient On (Oxygen Delivery Method): room air    MEDICATIONS  (STANDING):  atorvastatin 40 milliGRAM(s) Oral at bedtime  dextrose 5%. 1000 milliLiter(s) (100 mL/Hr) IV Continuous <Continuous>  dextrose 5%. 1000 milliLiter(s) (50 mL/Hr) IV Continuous <Continuous>  dextrose 50% Injectable 25 Gram(s) IV Push once  dextrose 50% Injectable 12.5 Gram(s) IV Push once  dextrose 50% Injectable 25 Gram(s) IV Push once  gabapentin 100 milliGRAM(s) Oral at bedtime  glucagon  Injectable 1 milliGRAM(s) IntraMuscular once  influenza  Vaccine (HIGH DOSE) 0.7 milliLiter(s) IntraMuscular once  insulin lispro (ADMELOG) corrective regimen sliding scale   SubCutaneous every 6 hours  lactated ringers. 1000 milliLiter(s) (150 mL/Hr) IV Continuous <Continuous>  metoprolol succinate ER 25 milliGRAM(s) Oral daily  pantoprazole    Tablet 40 milliGRAM(s) Oral before breakfast    MEDICATIONS  (PRN):  acetaminophen     Tablet .. 650 milliGRAM(s) Oral every 6 hours PRN Temp greater or equal to 38C (100.4F), Mild Pain (1 - 3)  aluminum hydroxide/magnesium hydroxide/simethicone Suspension 30 milliLiter(s) Oral every 4 hours PRN Dyspepsia  dextrose Oral Gel 15 Gram(s) Oral once PRN Blood Glucose LESS THAN 70 milliGRAM(s)/deciliter  melatonin 3 milliGRAM(s) Oral at bedtime PRN Insomnia  morphine  - Injectable 4 milliGRAM(s) IV Push every 4 hours PRN Severe Pain (7 - 10)  ondansetron Injectable 4 milliGRAM(s) IV Push every 8 hours PRN Nausea and/or Vomiting  oxycodone    5 mG/acetaminophen 325 mG 1 Tablet(s) Oral every 6 hours PRN Moderate Pain (4 - 6)    EXAM:  GEN: NAD  abd: soft, +mild LUQ tenderness, no rebound/guarding    Labs:  CAPILLARY BLOOD GLUCOSE      POCT Blood Glucose.: 96 mg/dL (13 Sep 2023 11:42)  POCT Blood Glucose.: 109 mg/dL (13 Sep 2023 07:26)  POCT Blood Glucose.: 101 mg/dL (12 Sep 2023 21:13)  POCT Blood Glucose.: 96 mg/dL (12 Sep 2023 17:49)                          13.0   14.80 )-----------( 258      ( 13 Sep 2023 04:30 )             39.5         09-13    137  |  100  |  8<L>  ----------------------------<  94  3.9   |  26  |  0.6<L>      Calcium: 9.9 mg/dL (09-13-23 @ 04:30)      LFTs:             6.0  | 0.6  | 13       ------------------[29      ( 13 Sep 2023 04:30 )  4.0  | x    | 25          Lipase:799    Amylase:x         Lactate, Blood: 1.9 mmol/L (09-13-23 @ 11:00)  Lactate, Blood: 2.7 mmol/L (09-12-23 @ 09:45)      Coags:     16.80  ----< 1.46    ( 13 Sep 2023 04:30 )     37.9        CARDIAC MARKERS ( 12 Sep 2023 09:45 )  x     / <0.01 ng/mL / x     / x     / x          Urinalysis Basic - ( 13 Sep 2023 04:30 )    Color: x / Appearance: x / SG: x / pH: x  Gluc: 94 mg/dL / Ketone: x  / Bili: x / Urobili: x   Blood: x / Protein: x / Nitrite: x   Leuk Esterase: x / RBC: x / WBC x   Sq Epi: x / Non Sq Epi: x / Bacteria: x        < from: US Abdomen Upper Quadrant Right (09.12.23 @ 10:48) >  IMPRESSION:  Cholelithiasis with gallbladder sludge visualized.No sonographic Garcia   sign or gallbladder distention.    Since 8/28/2023, there is interval pancreatic duct dilatation.  Hepatic steatosis.      < end of copied text >  < from: CT Abdomen and Pelvis w/ IV Cont (09.12.23 @ 11:04) >  IMPRESSION:    Acute pancreatitis.    No evidence for pancreatic necrosis or drainable collection. No definite   pancreatic head lesion. Recommend continued attention on follow-up.    < end of copied text >

## 2023-09-13 NOTE — PROGRESS NOTE ADULT - SUBJECTIVE AND OBJECTIVE BOX
SUBJECTIVE:    Patient is a 72y old Male who presents with a chief complaint of Gallstone Pancreatitis (13 Sep 2023 14:17)    Currently admitted to medicine with the primary diagnosis of Pancreatitis       Today is hospital day 1d. This morning he is resting comfortably in bed and reports no new issues or overnight events.     ROS:   CONSTITUTIONAL: No weakness, fevers or chills   EYES/ENT: No visual changes; No vertigo or throat pain   NECK: No pain or stiffness   RESPIRATORY: No cough, wheezing, hemoptysis; No shortness of breath   CARDIOVASCULAR: No chest pain or palpitations   GASTROINTESTINAL: No abdominal or epigastric pain. No nausea, vomiting, or hematemesis; No diarrhea or constipation. No melena or hematochezia.  GENITOURINARY: No dysuria, frequency or hematuria  NEUROLOGICAL: No numbness or weakness  SKIN: No itching, rashes      PAST MEDICAL & SURGICAL HISTORY  High cholesterol    Elevated cholesterol with high triglycerides    Pancreatitis    Acid reflux    BP (high blood pressure)    No significant past surgical history      SOCIAL HISTORY:    ALLERGIES:  No Known Allergies    MEDICATIONS:  STANDING MEDICATIONS  atorvastatin 40 milliGRAM(s) Oral at bedtime  dextrose 5%. 1000 milliLiter(s) IV Continuous <Continuous>  dextrose 5%. 1000 milliLiter(s) IV Continuous <Continuous>  dextrose 50% Injectable 25 Gram(s) IV Push once  dextrose 50% Injectable 25 Gram(s) IV Push once  dextrose 50% Injectable 12.5 Gram(s) IV Push once  gabapentin 100 milliGRAM(s) Oral at bedtime  glucagon  Injectable 1 milliGRAM(s) IntraMuscular once  influenza  Vaccine (HIGH DOSE) 0.7 milliLiter(s) IntraMuscular once  insulin lispro (ADMELOG) corrective regimen sliding scale   SubCutaneous every 6 hours  lactated ringers. 1000 milliLiter(s) IV Continuous <Continuous>  metoprolol succinate ER 25 milliGRAM(s) Oral daily  pantoprazole    Tablet 40 milliGRAM(s) Oral before breakfast    PRN MEDICATIONS  acetaminophen     Tablet .. 650 milliGRAM(s) Oral every 6 hours PRN  aluminum hydroxide/magnesium hydroxide/simethicone Suspension 30 milliLiter(s) Oral every 4 hours PRN  dextrose Oral Gel 15 Gram(s) Oral once PRN  melatonin 3 milliGRAM(s) Oral at bedtime PRN  morphine  - Injectable 4 milliGRAM(s) IV Push every 4 hours PRN  ondansetron Injectable 4 milliGRAM(s) IV Push every 8 hours PRN  oxycodone    5 mG/acetaminophen 325 mG 1 Tablet(s) Oral every 6 hours PRN    VITALS:   T(F): 96.5  HR: 60  BP: 179/84  RR: 16  SpO2: 97%    LABS:  Negative for smoking/alcohol/drug use.                         13.0   14.80 )-----------( 258      ( 13 Sep 2023 04:30 )             39.5     09-13    137  |  100  |  8<L>  ----------------------------<  94  3.9   |  26  |  0.6<L>    Ca    9.9      13 Sep 2023 04:30    TPro  6.0  /  Alb  4.0  /  TBili  0.6  /  DBili  x   /  AST  13  /  ALT  25  /  AlkPhos  29<L>  09-13    PT/INR - ( 13 Sep 2023 04:30 )   PT: 16.80 sec;   INR: 1.46 ratio         PTT - ( 13 Sep 2023 04:30 )  PTT:37.9 sec  Urinalysis Basic - ( 13 Sep 2023 04:30 )    Color: x / Appearance: x / SG: x / pH: x  Gluc: 94 mg/dL / Ketone: x  / Bili: x / Urobili: x   Blood: x / Protein: x / Nitrite: x   Leuk Esterase: x / RBC: x / WBC x   Sq Epi: x / Non Sq Epi: x / Bacteria: x        Lactate, Blood: 1.9 mmol/L (09-13-23 @ 11:00)      CARDIAC MARKERS ( 12 Sep 2023 09:45 )  x     / <0.01 ng/mL / x     / x     / x          RADIOLOGY:    PHYSICAL EXAM:  GEN: No acute distress  HEENT: normocephalic, atraumatic, aniceteric  LUNGS: bl breath sounds   HEART: S1/S2 present. RRR, no murmurs  ABD: Soft, non-tender, non-distended. Bowel sounds present  EXT: no edema   NEURO: AAOX3, normal affect      ASSESSMENT AND PLAN:    73yo Male with PMHx of DM2, HLD, Afib on eliquis at home  who presents with worsening RUQ and epigastric pain and found to have acute gallstone pancreatitis. Will need lap idania at some point, but requires medical optimization prior. Surgery consulted for lap idania planning. In the interim, will keep NPO/CLD, IVF, IV pain meds, and discuss any further testing required.     #Acute Gallstone Pancreatitis   - no sepsis present on admission   - lipase 957--> 799   - CT AP - acute pancreatitis ; no pancreatic necrosis/ drainable collection ; no definite pancreatic head lesion   - RUQ sono - Cholelithiasis with gallbladder sludge visualized.No sonographic Garcia   sign or gallbladder distention. Since 8/28/2023, there is interval pancreatic duct dilatation.  -pain control , zofran prn for nausea   - Surgery eval appreciated- possible lap idania on Friday 9/15 - requesting cardio risk stratification     #Recent dx of Afib   - Currently rate controlled  - Cont Metop XL 25mg and ELiquis 5mg BID >> discuss timing of when to hold eliquis for surgery     #DM2  - ISS   - hold metformin and pioglitazone     #HLD   - Cont atorva 40mg SUBJECTIVE:    Patient is a 72y old Male who presents with a chief complaint of Gallstone Pancreatitis (13 Sep 2023 14:17)    Currently admitted to medicine with the primary diagnosis of Pancreatitis       Today is hospital day 1d. This morning he is resting comfortably in bed and reports no new issues or overnight events.     feels better    ROS:   CONSTITUTIONAL: No weakness, fevers or chills   EYES/ENT: No visual changes; No vertigo or throat pain   NECK: No pain or stiffness   RESPIRATORY: No cough, wheezing, hemoptysis; No shortness of breath   CARDIOVASCULAR: No chest pain or palpitations   GASTROINTESTINAL: No abdominal or epigastric pain. No nausea, vomiting, or hematemesis; No diarrhea or constipation. No melena or hematochezia.  GENITOURINARY: No dysuria, frequency or hematuria  NEUROLOGICAL: No numbness or weakness  SKIN: No itching, rashes      PAST MEDICAL & SURGICAL HISTORY  High cholesterol    Elevated cholesterol with high triglycerides    Pancreatitis    Acid reflux    BP (high blood pressure)    No significant past surgical history      SOCIAL HISTORY:    ALLERGIES:  No Known Allergies    MEDICATIONS:  STANDING MEDICATIONS  atorvastatin 40 milliGRAM(s) Oral at bedtime  dextrose 5%. 1000 milliLiter(s) IV Continuous <Continuous>  dextrose 5%. 1000 milliLiter(s) IV Continuous <Continuous>  dextrose 50% Injectable 25 Gram(s) IV Push once  dextrose 50% Injectable 25 Gram(s) IV Push once  dextrose 50% Injectable 12.5 Gram(s) IV Push once  gabapentin 100 milliGRAM(s) Oral at bedtime  glucagon  Injectable 1 milliGRAM(s) IntraMuscular once  influenza  Vaccine (HIGH DOSE) 0.7 milliLiter(s) IntraMuscular once  insulin lispro (ADMELOG) corrective regimen sliding scale   SubCutaneous every 6 hours  lactated ringers. 1000 milliLiter(s) IV Continuous <Continuous>  metoprolol succinate ER 25 milliGRAM(s) Oral daily  pantoprazole    Tablet 40 milliGRAM(s) Oral before breakfast    PRN MEDICATIONS  acetaminophen     Tablet .. 650 milliGRAM(s) Oral every 6 hours PRN  aluminum hydroxide/magnesium hydroxide/simethicone Suspension 30 milliLiter(s) Oral every 4 hours PRN  dextrose Oral Gel 15 Gram(s) Oral once PRN  melatonin 3 milliGRAM(s) Oral at bedtime PRN  morphine  - Injectable 4 milliGRAM(s) IV Push every 4 hours PRN  ondansetron Injectable 4 milliGRAM(s) IV Push every 8 hours PRN  oxycodone    5 mG/acetaminophen 325 mG 1 Tablet(s) Oral every 6 hours PRN    VITALS:   T(F): 96.5  HR: 60  BP: 179/84  RR: 16  SpO2: 97%    LABS:  Negative for smoking/alcohol/drug use.                         13.0   14.80 )-----------( 258      ( 13 Sep 2023 04:30 )             39.5     09-13    137  |  100  |  8<L>  ----------------------------<  94  3.9   |  26  |  0.6<L>    Ca    9.9      13 Sep 2023 04:30    TPro  6.0  /  Alb  4.0  /  TBili  0.6  /  DBili  x   /  AST  13  /  ALT  25  /  AlkPhos  29<L>  09-13    PT/INR - ( 13 Sep 2023 04:30 )   PT: 16.80 sec;   INR: 1.46 ratio         PTT - ( 13 Sep 2023 04:30 )  PTT:37.9 sec  Urinalysis Basic - ( 13 Sep 2023 04:30 )    Color: x / Appearance: x / SG: x / pH: x  Gluc: 94 mg/dL / Ketone: x  / Bili: x / Urobili: x   Blood: x / Protein: x / Nitrite: x   Leuk Esterase: x / RBC: x / WBC x   Sq Epi: x / Non Sq Epi: x / Bacteria: x        Lactate, Blood: 1.9 mmol/L (09-13-23 @ 11:00)      CARDIAC MARKERS ( 12 Sep 2023 09:45 )  x     / <0.01 ng/mL / x     / x     / x          RADIOLOGY:    PHYSICAL EXAM:  GEN: No acute distress  HEENT: normocephalic, atraumatic, aniceteric  LUNGS: bl breath sounds   HEART: S1/S2 present. RRR, no murmurs  ABD: Soft, non-tender, non-distended. Bowel sounds present  EXT: no edema   NEURO: AAOX3, normal affect      ASSESSMENT AND PLAN:    71yo Male with PMHx of DM2, HLD, Afib on eliquis at home  who presents with worsening RUQ and epigastric pain and found to have acute gallstone pancreatitis. Will need lap idania at some point, but requires medical optimization prior. Surgery consulted for lap idania planning. In the interim, will keep NPO/CLD, IVF, IV pain meds, and discuss any further testing required.     #Acute Gallstone Pancreatitis   # H/O Acute cholecystitis 8/23  - admission 8/28-8/31 - lap idania was cancelled given new onset afib with rvr - pt prefered outpatient follow up and did not risk stratification inpatient   - no sepsis present on admission   - denies alcohol abuse /   - lipase 957--> 799   - CT AP - acute pancreatitis ; no pancreatic necrosis/ drainable collection ; no definite pancreatic head lesion   - RUQ sono - Cholelithiasis with gallbladder sludge visualized.No sonographic Garcia   sign or gallbladder distention. Since 8/28/2023, there is interval pancreatic duct dilatation.  -pain control , zofran prn for nausea   - Surgery eval appreciated- possible lap idania on Friday 9/15 - requesting cardio risk stratification   - GI following - EUS vs. MRI panc protocol in 8 weeks as an outpatient to rule out Pancreas divisum and Pancreatic neoplasm  - IVF , pain control     # H/O Chronic Afib , rate controlled   - on Metop XL 25mg at home   - HOLD Eliquis 5mg BID (last dose 9/13 AM)   - chadsvasc - 3     #DM2- ISS - hold metformin and pioglitazone     #HLD - Cont atorva 40mg    dvt/gi ppx-diet- CLD  dispo: acute  handoff: clinical improvement/ lap idania 9/15 / preop cardio risk stratification

## 2023-09-13 NOTE — PROGRESS NOTE ADULT - ASSESSMENT
73yo Male with PMHx of DM2, HLD, Afib, recent admission 8/28 -8/31 for gallstone pancreatitis (was scheduled for lap idania but developed afib w/RVR ) who presents with worsening RUQ and epigastric pain and found to have acute gallstone pancreatitis. Will need lap idania at some point, but requires medical optimization prior. Surgery consulted for lap idania planning. In the interim, will keep NPO/CLD, IVF, IV pain meds, and discuss any further testing required.     Pt seen with Dr. Marcelino:    #Acute Gallstone Pancreatitis   - may start clears  -pt tentatively booked for robotic cholecystectomy on Friday 9/15  - hold eliquis (last dose this AM)   - need cardio clearance   - will follow 73yo Male with PMHx of DM2, HLD, Afib, recent admission 8/28 -8/31 for gallstone pancreatitis (was scheduled for lap idania but developed afib w/RVR ) who presents with worsening RUQ and epigastric pain and found to have acute gallstone pancreatitis. Will need lap idania at some point, but requires medical optimization prior. Surgery consulted for lap idania planning. In the interim, will keep NPO/CLD, IVF, IV pain meds, and discuss any further testing required.     Pt seen with Dr. Marcelino:    #Acute Gallstone Pancreatitis   - may start clears  -pt tentatively booked for robotic cholecystectomy on Friday 9/15  - hold eliquis (last dose this AM)  - needs cardio clearance/risk stratification  - will follow

## 2023-09-13 NOTE — PROGRESS NOTE ADULT - ASSESSMENT
73yo Male with PMHx of DM2, HLD, Afib, recent admission 8/28 -8/31 for gallstone pancreatitis (was scheduled for lap idania but developed afib w/RVR ) who presents with worsening RUQ and epigastric pain and found to have acute gallstone pancreatitis. Will need lap idania at some point, but requires medical optimization prior. Surgery consulted for lap idania planning. In the interim, will keep clear liquid diet, IVF, IV pain meds, and discuss any further testing required.     Pt seen with Dr. Marcelino:    #Acute Gallstone Pancreatitis   - may start clears  -pt tentatively booked for robotic cholecystectomy on Friday 9/15  - hold eliquis (last dose this AM)   - need cardio clearance   - will follow

## 2023-09-13 NOTE — PROGRESS NOTE ADULT - ASSESSMENT
72yMale pmh DL, DM2, heavy smoker (2-3 PPD), GERD, known gallstones, and history of pancreatitis, here for abd pain, being evaluated for pancreatitis.    Problem 1- acute interstitial pancreatitis - likely gallstone related vs other etiology - less likely drug induced (pt denied OTC or herbals or new meds that could explain pancreatitis) vs panc divisum malignancy vs less likely alcohol (pt denied)  3/3 elevated lipase, epigastric pain, imaging findings on CT  US w/ Cholelithiasis with gallbladder sludge visualized. No sonographic Garcia sign or gallbladder distention.  he had a recent HIDA that was concerning for acute cholecystitis  Since 8/28/2023, there is interval pancreatic duct dilatation 4.2mm PD  patient feeling 40% better  Rec  -can start clear liquids  -CT and RUQ Abdominal Ultrasound appreciated  -Aggressive IV hydration with lactated ringers, check daily weights   recommend bolus of 10 ml / kg x 1 followed by 1.5 ml/kg/hr maintenance therapy for now  -Strict Is and Os  -Check BUN and HCT daily goal is that both should go down  -IF BUN or HCT rise, please increase fluid  -if clinically improved tomorrow, can advance to clear liquid diet  -EUS vs. MRI panc protocol in 8 weeks as an outpatient to rule out Pancreas divisum and Pancreatic neoplasm  -surgery evaluation for cholecystectomy preferably prior to discharge to prevent future presentations from symptomatic gallstones    Problem 2-Hepatic steatosis  Rec  -Dietary and lifestyle modifications advised   -outpatient fibroscan   -outpatient can check hepatitis panel  -outpatient workup 72yMale pmh DL, DM2, heavy smoker (2-3 PPD), GERD, known gallstones, and history of pancreatitis, here for abd pain, being evaluated for pancreatitis.    Problem 1- acute interstitial pancreatitis - likely gallstone related vs other etiology - less likely drug induced (pt denied OTC or herbals or new meds that could explain pancreatitis) vs panc divisum malignancy vs less likely alcohol (pt denied)  3/3 elevated lipase, epigastric pain, imaging findings on CT  US w/ Cholelithiasis with gallbladder sludge visualized. No sonographic Garcia sign or gallbladder distention.  he had a recent HIDA that was concerning for acute cholecystitis  Since 8/28/2023, there is interval pancreatic duct dilatation 4.2mm PD  patient feeling 40% better  Rec  -can start clear liquids  -CT and RUQ Abdominal Ultrasound appreciated  -Aggressive IV hydration with lactated ringers, check daily weights   recommend bolus of 10 ml / kg x 1 followed by 1.5 ml/kg/hr maintenance therapy for now  -Strict Is and Os  -Check BUN and HCT daily goal is that both should go down  -IF BUN or HCT rise, please increase fluid  -EUS vs. MRI panc protocol in 8 weeks as an outpatient to rule out Pancreas divisum and Pancreatic neoplasm  -surgery evaluation for cholecystectomy preferably prior to discharge to prevent future presentations from symptomatic gallstones    Problem 2-Hepatic steatosis  Rec  -Dietary and lifestyle modifications advised   -outpatient fibroscan   -outpatient can check hepatitis panel  -outpatient workup

## 2023-09-13 NOTE — PROGRESS NOTE ADULT - SUBJECTIVE AND OBJECTIVE BOX
Subjective:   Pt complains of LUQ pain that can radiate to the back at times. Pt states his pain is being controlled well by pain medications. Pt denies any nausea or vomiting. Pt is afebrile.    Objective:  Physical Exam:  General: NAD, resting comfortably  HEENT: NC/AT, EOMI, normal hearing, no oral lesions, no LAD, neck supple  Pulmonary: CTA B/L  Cardiovascular: S1, S2 w/RRR  Abdominal: LUQ tenderness  Extremities:  no clubbing/cyanosis/edema  Neuro: A/O x 3    Vital Signs Last 24 Hrs  T(C): 35.8 (13 Sep 2023 14:04), Max: 36.9 (12 Sep 2023 14:30)  T(F): 96.5 (13 Sep 2023 14:04), Max: 98.5 (12 Sep 2023 14:30)  HR: 60 (13 Sep 2023 14:04) (56 - 72)  BP: 179/84 (13 Sep 2023 14:04) (158/74 - 197/78)  BP(mean): --  RR: 16 (13 Sep 2023 14:04) (16 - 20)  SpO2: 97% (13 Sep 2023 14:04) (97% - 100%)  Parameters below as of 13 Sep 2023 14:04  Patient On (Oxygen Delivery Method): room air    PAST MEDICAL & SURGICAL HISTORY:  High cholesterol  Elevated cholesterol with high triglycerides  Pancreatitis  Acid reflux  BP (high blood pressure)  No significant past surgical history    MEDICATIONS  (STANDING):  atorvastatin 40 milliGRAM(s) Oral at bedtime  dextrose 5%. 1000 milliLiter(s) (100 mL/Hr) IV Continuous <Continuous>  dextrose 5%. 1000 milliLiter(s) (50 mL/Hr) IV Continuous <Continuous>  dextrose 50% Injectable 25 Gram(s) IV Push once  dextrose 50% Injectable 12.5 Gram(s) IV Push once  dextrose 50% Injectable 25 Gram(s) IV Push once  gabapentin 100 milliGRAM(s) Oral at bedtime  glucagon  Injectable 1 milliGRAM(s) IntraMuscular once  influenza  Vaccine (HIGH DOSE) 0.7 milliLiter(s) IntraMuscular once  insulin lispro (ADMELOG) corrective regimen sliding scale   SubCutaneous every 6 hours  lactated ringers. 1000 milliLiter(s) (150 mL/Hr) IV Continuous <Continuous>  metoprolol succinate ER 25 milliGRAM(s) Oral daily  pantoprazole    Tablet 40 milliGRAM(s) Oral before breakfast    MEDICATIONS  (PRN):  acetaminophen     Tablet .. 650 milliGRAM(s) Oral every 6 hours PRN Temp greater or equal to 38C (100.4F), Mild Pain (1 - 3)  aluminum hydroxide/magnesium hydroxide/simethicone Suspension 30 milliLiter(s) Oral every 4 hours PRN Dyspepsia  dextrose Oral Gel 15 Gram(s) Oral once PRN Blood Glucose LESS THAN 70 milliGRAM(s)/deciliter  melatonin 3 milliGRAM(s) Oral at bedtime PRN Insomnia  morphine  - Injectable 4 milliGRAM(s) IV Push every 4 hours PRN Severe Pain (7 - 10)  ondansetron Injectable 4 milliGRAM(s) IV Push every 8 hours PRN Nausea and/or Vomiting  oxycodone    5 mG/acetaminophen 325 mG 1 Tablet(s) Oral every 6 hours PRN Moderate Pain (4 - 6)    Allergies  No Known Allergies  Intolerances  I&O's Summary    LABS:                        13.0   14.80 )-----------( 258      ( 13 Sep 2023 04:30 )             39.5     09-13    137  |  100  |  8<L>  ----------------------------<  94  3.9   |  26  |  0.6<L>    Ca    9.9      13 Sep 2023 04:30    TPro  6.0  /  Alb  4.0  /  TBili  0.6  /  DBili  x   /  AST  13  /  ALT  25  /  AlkPhos  29<L>  09-13    PT/INR - ( 13 Sep 2023 04:30 )   PT: 16.80 sec;   INR: 1.46 ratio         PTT - ( 13 Sep 2023 04:30 )  PTT:37.9 sec  Urinalysis Basic - ( 13 Sep 2023 04:30 )    Color: x / Appearance: x / SG: x / pH: x  Gluc: 94 mg/dL / Ketone: x  / Bili: x / Urobili: x   Blood: x / Protein: x / Nitrite: x   Leuk Esterase: x / RBC: x / WBC x   Sq Epi: x / Non Sq Epi: x / Bacteria: x    CAPILLARY BLOOD GLUCOSE  POCT Blood Glucose.: 96 mg/dL (13 Sep 2023 11:42)  POCT Blood Glucose.: 109 mg/dL (13 Sep 2023 07:26)  POCT Blood Glucose.: 101 mg/dL (12 Sep 2023 21:13)  POCT Blood Glucose.: 96 mg/dL (12 Sep 2023 17:49)    LIVER FUNCTIONS - ( 13 Sep 2023 04:30 )  Alb: 4.0 g/dL / Pro: 6.0 g/dL / ALK PHOS: 29 U/L / ALT: 25 U/L / AST: 13 U/L / GGT: x           Cultures:    RADIOLOGY & ADDITIONAL STUDIES:    < from: CT Abdomen and Pelvis w/ IV Cont (09.12.23 @ 11:04) >    ACC: [id number] EXAM:  CT ABDOMEN AND PELVIS IC   ORDERED BY: MARIBEL COLON     PROCEDURE DATE:  09/12/2023        < end of copied text >  < from: CT Abdomen and Pelvis w/ IV Cont (09.12.23 @ 11:04) >  indings:    01. LIVER: Normal morphology. No focal lesion. Periportal edema is noted   tracking into the left hepatic lobe, may be due to pancreatic   inflammation.  02. SPLEEN: Splenomegaly measuring 14.1 cm (series 6 on image 79),   previously 14.6 cm.  03. PANCREAS: Pancreatic inflammatory changes surrounding the head and   neck. No evidence of pancreatic necrosis or drainable collection. No   definite pancreatic head lesion. No duct dilation. Inflammatory changes   extend to involve the adjacent duodenum and rio hepatis.  04. GALLBLADDER/BILIARY TREE: Cholelithiasis. CBD measures 7 mm which is   within normal limits for age (series 6 on image 60).  05. ADRENALS: Normal.  06. KIDNEYS: Symmetric enhancement. No hydronephrosis or renal stone.   Bilateral renal cysts and hypodensities too small to further characterize.  07. LYMPHADENOPATHY/RETROPERITONEUM: Peripancreatic lymph nodes measuring   up to 1.3 cm (series 301 image 32), likely reactive.  08. BOWEL: No bowel obstruction. Unremarkable appendix.  09. PELVIC VISCERA: Prostate measures 4.6 cm (series 301 image 114).   Unremarkable urinary bladder.  10. PELVIC LYMPH NODES: No lymphadenopathy.  11. VASCULATURE: Normal caliber abdominal aorta. Calcified   atherosclerotic disease of the aorta and its branches.  12. PERITONEUM/ABDOMINAL WALL: No gross ascites  13. SKELETAL: Degenerative changes. Chronic T12 compression deformity.  14. LUNG BASES: Small bilateral atelectasis.    IMPRESSION:    Acute pancreatitis.    No evidence for pancreatic necrosis or drainable collection. No definite   pancreatic head lesion. Recommend continued attention on follow-up.    < end of copied text >

## 2023-09-13 NOTE — CONSULT NOTE ADULT - ASSESSMENT
71 y/o M pmh HTN, HLD, NIDDM, heavy smoker (2-3 PPD), recent pAfib on Eliquis, GERD, Pancreatitis, and peripheral neuropathy,  who presents for RUQ pain admitted for acute gallstone pancreatitis. Cardiac pre-op eval requested for cholecystectomy.     Trop <0.01  ECG: NS, IRBBB, no acute ST changes  TTE 08/30/23: normal LVSF EF 55-60. no RWMA    Plan:  - Pt currently hd stable back in NSR, denies chest pain and SOB  - Pt again offered but refused inpatient pharmacological thallium stress test at this time. Pt is aware of risks  - functional capacity > 4METS  - cardiac risk for lap idania is intermediate  - c/w BB/Statin  - CHADS-VASC: 3 - Would continue on Eliquis for TE ppx, hold 36hr prior to procedure, resume post-op when stable  - counselled regarding smoking cessation  - f/u with his cardiologist

## 2023-09-13 NOTE — CONSULT NOTE ADULT - NS ATTEND AMEND GEN_ALL_CORE FT
71 y/o M pmh HTN, HLD, NIDDM, heavy smoker (2-3 PPD), recent pAfib on Eliquis, GERD, Pancreatitis, and peripheral neuropathy,  who presents for RUQ pain admitted for acute gallstone pancreatitis. He denies symptoms angina or CHF. He can walk several blocks and go up several fllights steps No issue. Cardiac risk surgery intermediate.. Patient in past declined Adenocard thallium . He again declined
I edited the note.   Time-based billing (NON-critical care).   75 minutes spent on total encounter; more than 50% of the visit was spent counseling and / or coordinating care by the attending physician.  The necessity of the time spent during the encounter on this date of service was due to: Coordination of care.

## 2023-09-13 NOTE — CONSULT NOTE ADULT - SUBJECTIVE AND OBJECTIVE BOX
HPI:  73yo Male with PMHx of DM2, HLD, Afib who presents with worsening RUQ and epigastric pain. Pt originally admitted to [facility] from 8/28-/31 for acute cholecystitis. Surgery was consulted and was planning on a Lap idania, however, course was complicated by new Afib with RVR which caused his surgery to get delayed for medical optimization. Patient declined to stay for further optimization prior to surgery and after shared-decision making, opted to pursue outpatient follow up with his outpatient providers. He reports feeling well up until day prior to presentation when pain in the RUQ, epigastrum returned. Descibes the pain as a 9/10, "tightness" radiating to his back associated with loss of appetite and dry heaves. Denies fever, chills, chest pain, SOB, nausea, vomiting, diarrhea, constipation.     In the ED, afebrile, hypertensive to SBP 170s, otherwise hemodynamically stable. Labs were notable for WBC 16, Lipase 957, Lactate 2.7. RUQUS with cholelithiasis and gallbladder sludge with interval development of pancreatic duct dilation. CT A/P showed acute pancreatitis with no necrosis or fluid collection. Admitted to medicine for further management.  (12 Sep 2023 16:42)        Cardiology Update: 73 y/o m pmh HTN, HLD, NIDDM, heavy smoker (2-3 PPD), recent pAfib on Eliquis, GERD, Pancreatitis, and peripheral neuropathy,  who presents for RUQ pain admitted for acute gallstone pancreatitis. Cardiac pre-op eval requested for cholecystectomy.   Of note, pt was recently seen by [facility] cardiology team on previous admission 8/31/23, was offered but refused pharm stress test at the time. Pt claims ambulatory at baseline without assistive device, able to walk few blocks and climb multiple flights of stairs  without anginal symptoms. Denies denies chest pain, sob, palpitation, leg swelling. Pt states he follows up with cardiologist Dr Reeves, no previous ischemic w/u            PAST MEDICAL & SURGICAL HISTORY  High cholesterol    Elevated cholesterol with high triglycerides    Pancreatitis    Acid reflux    BP (high blood pressure)    No significant past surgical history        FAMILY HISTORY:  FAMILY HISTORY:      SOCIAL HISTORY:  []smoker  []Alcohol  []Drug    ALLERGIES:  No Known Allergies      MEDICATIONS:  MEDICATIONS  (STANDING):  atorvastatin 40 milliGRAM(s) Oral at bedtime  dextrose 5%. 1000 milliLiter(s) (50 mL/Hr) IV Continuous <Continuous>  dextrose 5%. 1000 milliLiter(s) (100 mL/Hr) IV Continuous <Continuous>  dextrose 50% Injectable 25 Gram(s) IV Push once  dextrose 50% Injectable 25 Gram(s) IV Push once  dextrose 50% Injectable 12.5 Gram(s) IV Push once  enoxaparin Injectable 90 milliGRAM(s) SubCutaneous every 12 hours  gabapentin 100 milliGRAM(s) Oral at bedtime  glucagon  Injectable 1 milliGRAM(s) IntraMuscular once  influenza  Vaccine (HIGH DOSE) 0.7 milliLiter(s) IntraMuscular once  insulin lispro (ADMELOG) corrective regimen sliding scale   SubCutaneous every 6 hours  lactated ringers. 1000 milliLiter(s) (150 mL/Hr) IV Continuous <Continuous>  metoprolol succinate ER 50 milliGRAM(s) Oral daily  pantoprazole    Tablet 40 milliGRAM(s) Oral before breakfast    MEDICATIONS  (PRN):  acetaminophen     Tablet .. 650 milliGRAM(s) Oral every 6 hours PRN Temp greater or equal to 38C (100.4F), Mild Pain (1 - 3)  aluminum hydroxide/magnesium hydroxide/simethicone Suspension 30 milliLiter(s) Oral every 4 hours PRN Dyspepsia  dextrose Oral Gel 15 Gram(s) Oral once PRN Blood Glucose LESS THAN 70 milliGRAM(s)/deciliter  melatonin 3 milliGRAM(s) Oral at bedtime PRN Insomnia  morphine  - Injectable 4 milliGRAM(s) IV Push every 4 hours PRN Severe Pain (7 - 10)  ondansetron Injectable 4 milliGRAM(s) IV Push every 8 hours PRN Nausea and/or Vomiting  oxycodone    5 mG/acetaminophen 325 mG 1 Tablet(s) Oral every 6 hours PRN Moderate Pain (4 - 6)      HOME MEDICATIONS:  Home Medications:  aspirin 81 mg oral delayed release capsule: orally (28 Aug 2023 22:27)  atorvastatin 40 mg oral tablet: 1 orally once a day (at bedtime) (12 Sep 2023 17:39)  gabapentin 100 mg oral capsule: 1 orally (28 Aug 2023 22:27)  MetFORMIN (Eqv-Glumetza) 1000 mg oral tablet, extended release: 1 orally 2 times a day (12 Sep 2023 17:39)  omeprazole 40 mg oral delayed release capsule: 1 orally once a day (12 Sep 2023 17:39)  pioglitazone 30 mg oral tablet: 1 orally (28 Aug 2023 22:27)      VITALS:   T(F): 96.5 (09-13 @ 14:04), Max: 98.5 (09-12 @ 14:30)  HR: 60 (09-13 @ 14:04) (56 - 72)  BP: 179/84 (09-13 @ 14:04) (158/74 - 197/78)  BP(mean): --  RR: 16 (09-13 @ 14:04) (16 - 20)  SpO2: 97% (09-13 @ 14:04) (97% - 100%)    I&O's Summary      REVIEW OF SYSTEMS:  CONSTITUTIONAL: No weakness, fevers or chills  EYES: No visual changes  ENT: No vertigo or throat pain   NECK: No pain or stiffness  RESPIRATORY: No cough, wheezing, hemoptysis; No shortness of breath  CARDIOVASCULAR: No chest pain or palpitations  GASTROINTESTINAL: No abdominal or epigastric pain. No nausea, vomiting, or hematemesis; No diarrhea or constipation. No melena or hematochezia.  GENITOURINARY: No dysuria, frequency or hematuria  NEUROLOGICAL: No numbness or weakness  SKIN: No itching, no rashes  MSK: no    PHYSICAL EXAM:  NEURO: patient is awake , alert and oriented  GEN: Not in acute distress  NECK: no thyroid enlargement, no JVD  LUNGS: Clear to auscultation bilaterally   CARDIOVASCULAR: S1/S2 present, RRR , no murmurs or rubs, no carotid bruits,  + PP bilaterally  ABD: Soft, non-tender, non-distended, +BS  EXT: No FALLON  SKIN: Intact    LABS:                        13.0   14.80 )-----------( 258      ( 13 Sep 2023 04:30 )             39.5     09-13    137  |  100  |  8<L>  ----------------------------<  94  3.9   |  26  |  0.6<L>    Ca    9.9      13 Sep 2023 04:30    TPro  6.0  /  Alb  4.0  /  TBili  0.6  /  DBili  x   /  AST  13  /  ALT  25  /  AlkPhos  29<L>  09-13    PT/INR - ( 13 Sep 2023 04:30 )   PT: 16.80 sec;   INR: 1.46 ratio         PTT - ( 13 Sep 2023 04:30 )  PTT:37.9 sec  Lactate, Blood: 1.9 mmol/L (09-13-23 @ 11:00)    CARDIAC MARKERS ( 12 Sep 2023 09:45 )  x     / <0.01 ng/mL / x     / x     / x            Troponin trend:      09-12 Chol -- LDL -- HDL -- Trig 209<H>      RADIOLOGY:      ECG:    TELEMETRY EVENTS:

## 2023-09-13 NOTE — PROGRESS NOTE ADULT - SUBJECTIVE AND OBJECTIVE BOX
72yMale  Being followed for gallstone pancreatitis   Interval history: Patient denies nausea, vomiting, hematemesis, melena, blood in stool, diarrhea, constipation, +epigastric abdominal pain. Patient reports epigastric pain is improving.      PAST MEDICAL & SURGICAL HISTORY:  High cholesterol      Elevated cholesterol with high triglycerides      Pancreatitis      Acid reflux      BP (high blood pressure)      No significant past surgical history                Social History: +cigarette smoking. No alcohol. No illegal drug use.            MEDICATIONS  (STANDING):  apixaban 5 milliGRAM(s) Oral every 12 hours  atorvastatin 40 milliGRAM(s) Oral at bedtime  dextrose 5%. 1000 milliLiter(s) (100 mL/Hr) IV Continuous <Continuous>  dextrose 5%. 1000 milliLiter(s) (50 mL/Hr) IV Continuous <Continuous>  dextrose 50% Injectable 25 Gram(s) IV Push once  dextrose 50% Injectable 12.5 Gram(s) IV Push once  dextrose 50% Injectable 25 Gram(s) IV Push once  gabapentin 100 milliGRAM(s) Oral at bedtime  glucagon  Injectable 1 milliGRAM(s) IntraMuscular once  influenza  Vaccine (HIGH DOSE) 0.7 milliLiter(s) IntraMuscular once  insulin lispro (ADMELOG) corrective regimen sliding scale   SubCutaneous every 6 hours  lactated ringers. 1000 milliLiter(s) (150 mL/Hr) IV Continuous <Continuous>  metoprolol succinate ER 25 milliGRAM(s) Oral daily  pantoprazole    Tablet 40 milliGRAM(s) Oral before breakfast    MEDICATIONS  (PRN):  acetaminophen     Tablet .. 650 milliGRAM(s) Oral every 6 hours PRN Temp greater or equal to 38C (100.4F), Mild Pain (1 - 3)  aluminum hydroxide/magnesium hydroxide/simethicone Suspension 30 milliLiter(s) Oral every 4 hours PRN Dyspepsia  dextrose Oral Gel 15 Gram(s) Oral once PRN Blood Glucose LESS THAN 70 milliGRAM(s)/deciliter  melatonin 3 milliGRAM(s) Oral at bedtime PRN Insomnia  morphine  - Injectable 4 milliGRAM(s) IV Push every 4 hours PRN Severe Pain (7 - 10)  ondansetron Injectable 4 milliGRAM(s) IV Push every 8 hours PRN Nausea and/or Vomiting      Allergies:   No Known Allergies  Intolerances          REVIEW OF SYSTEMS:  General:  No weight loss, fevers, or chills.  Eyes:  No reported pain or visual changes  ENT:  No sore throat or runny nose.  NECK: No stiffness   CV:  No chest pain or palpitations.  Resp:  No shortness of breath, cough  GI:  +epigastric abdominal pain, no nausea, vomiting, dysphagia, diarrhea or constipation. No rectal bleeding, melena, or hematemesis.  Neuro:  No tingling, numbness         VITAL SIGNS:   T(F): 96.5 (09-13-23 @ 04:23), Max: 98.5 (09-12-23 @ 14:30)  HR: 56 (09-13-23 @ 04:23) (56 - 72)  BP: 158/74 (09-13-23 @ 04:23) (158/74 - 197/78)  RR: 18 (09-13-23 @ 04:23) (18 - 20)  SpO2: 100% (09-12-23 @ 14:30) (100% - 100%)    PHYSICAL EXAM:  GENERAL: AAOx3, no acute distress.  HEAD:  Atraumatic, Normocephalic  EYES: conjunctiva and sclera clear  NECK: Supple, no JVD or thyromegaly  CHEST/LUNG: Clear to auscultation bilaterally; No wheeze, rhonchi, or rales  HEART: Regular rate and rhythm; normal S1, S2, No murmurs.  ABDOMEN: Soft, +epigastric tenderness, nondistended; Bowel sounds present  NEUROLOGY: No asterixis or tremor.   SKIN: Intact, no jaundice            LABS:                        14.0   16.44 )-----------( 299      ( 12 Sep 2023 09:45 )             42.5     09-12    139  |  99  |  13  ----------------------------<  125<H>  4.3   |  28  |  0.7    Ca    10.8<H>      12 Sep 2023 09:45    TPro  6.9  /  Alb  4.6  /  TBili  0.8  /  DBili  x   /  AST  24  /  ALT  34  /  AlkPhos  40  09-12    LIVER FUNCTIONS - ( 12 Sep 2023 09:45 )  Alb: 4.6 g/dL / Pro: 6.9 g/dL / ALK PHOS: 40 U/L / ALT: 34 U/L / AST: 24 U/L / GGT: x           PT/INR - ( 12 Sep 2023 09:45 )   PT: 14.20 sec;   INR: 1.24 ratio         PTT - ( 12 Sep 2023 09:45 )  PTT:35.6 sec    IMAGING:    < from: CT Abdomen and Pelvis w/ IV Cont (09.12.23 @ 11:04) >    ACC: [id number] EXAM:  CT ABDOMEN AND PELVIS IC   ORDERED BY: MARIBEL COLON     PROCEDURE DATE:  09/12/2023          INTERPRETATION:  Clinical Indication: Abdominal pain.    Technique: Multidetector-row CT images of the abdomen and pelvis were   obtained from the xiphoid through the symphysis pubis following the   administration of intravenous contrast. No oral contrast was   administered.  Coronal and sagittal reconstructions were performed.    Contrast: 95 cc Omnipaque 350 non-ionic intravenouscontrast.    Comparison: CT abdomen pelvis 8/28/2023. Abdominal ultrasound 9/12/2023    Findings:    01. LIVER: Normal morphology. No focal lesion. Periportal edema is noted   tracking into the left hepatic lobe, may be due to pancreatic   inflammation.  02. SPLEEN: Splenomegaly measuring 14.1 cm (series 6 on image 79),   previously 14.6 cm.  03. PANCREAS: Pancreatic inflammatory changes surrounding the head and   neck. No evidence of pancreatic necrosis or drainable collection. No   definite pancreatic head lesion. No duct dilation. Inflammatory changes   extend to involve the adjacent duodenum and rio hepatis.  04. GALLBLADDER/BILIARY TREE: Cholelithiasis. CBD measures 7 mm which is   within normal limits for age (series 6 on image 60).  05. ADRENALS: Normal.  06. KIDNEYS: Symmetric enhancement. No hydronephrosis or renal stone.   Bilateral renal cysts and hypodensities too small to further characterize.  07. LYMPHADENOPATHY/RETROPERITONEUM: Peripancreatic lymph nodes measuring   up to 1.3 cm (series 301 image 32), likely reactive.  08. BOWEL: No bowel obstruction. Unremarkable appendix.  09. PELVIC VISCERA: Prostate measures 4.6 cm (series 301 image 114).   Unremarkable urinary bladder.  10. PELVIC LYMPH NODES: No lymphadenopathy.  11. VASCULATURE: Normal caliber abdominal aorta. Calcified   atherosclerotic disease of the aorta and its branches.  12. PERITONEUM/ABDOMINAL WALL: No gross ascites  13. SKELETAL: Degenerative changes. Chronic T12 compression deformity.  14. LUNG BASES: Small bilateral atelectasis.    IMPRESSION:    Acute pancreatitis.    No evidence for pancreatic necrosis or drainable collection. No definite   pancreatic head lesion. Recommend continued attention on follow-up.    Additional findings are detailed in the body the report.      --- End of Report ---            CHELSEA HOLLOWAY MD; Attending Radiologist  This document has been electronically signed. Sep 12 2023  1:20PM    < end of copied text >    < from: US Abdomen Upper Quadrant Right (09.12.23 @ 10:48) >    ACC: [id number] EXAM:  US ABDOMEN RT UPR QUADRANT   ORDERED BY: MARIBEL COLON     PROCEDURE DATE:  09/12/2023          INTERPRETATION:  CLINICAL INFORMATION: Right upper quadrant pain    COMPARISON: Abdominal ultrasound 8/28/2023, CT abdomen and pelvis   8/20/2023    TECHNIQUE: Sonography of the right upper quadrant.    FINDINGS:  Liver: Increased echogenicity.  Bile ducts: Normal caliber. Common bile duct measures 7 mm.  Gallbladder: Cholelithiasis with gallbladder sludge visualized. Mildly   distended gallbladder wall. No sonographic Garcia sign or gallbladder   distention.  Pancreas: Pancreatic duct dilatation measuring 4.2 mm.  Right kidney: 12.4 cm. No hydronephrosis. Upper pole cyst measuring 3.4   cm.  Ascites: None.  IVC: Visualizedportions are within normal limits.    IMPRESSION:  Cholelithiasis with gallbladder sludge visualized.No sonographic Garcia   sign or gallbladder distention.    Since 8/28/2023, there is interval pancreatic duct dilatation.  Hepatic steatosis.    --- End of Report ---          SETH ZHANG MD; Resident Radiologist  This document has been electronically signed.  CHRISTIANA GARCIA MD; Attending Interventional Radiologist  This document has been electronically signed. Sep 12 2023 11:12AM    < end of copied text >

## 2023-09-14 LAB
ALBUMIN SERPL ELPH-MCNC: 3.9 G/DL — SIGNIFICANT CHANGE UP (ref 3.5–5.2)
ALP SERPL-CCNC: 30 U/L — SIGNIFICANT CHANGE UP (ref 30–115)
ALT FLD-CCNC: 19 U/L — SIGNIFICANT CHANGE UP (ref 0–41)
ANION GAP SERPL CALC-SCNC: 13 MMOL/L — SIGNIFICANT CHANGE UP (ref 7–14)
AST SERPL-CCNC: 12 U/L — SIGNIFICANT CHANGE UP (ref 0–41)
BASOPHILS # BLD AUTO: 0.03 K/UL — SIGNIFICANT CHANGE UP (ref 0–0.2)
BASOPHILS NFR BLD AUTO: 0.3 % — SIGNIFICANT CHANGE UP (ref 0–1)
BILIRUB SERPL-MCNC: 0.6 MG/DL — SIGNIFICANT CHANGE UP (ref 0.2–1.2)
BUN SERPL-MCNC: 8 MG/DL — LOW (ref 10–20)
CALCIUM SERPL-MCNC: 10 MG/DL — SIGNIFICANT CHANGE UP (ref 8.4–10.5)
CHLORIDE SERPL-SCNC: 101 MMOL/L — SIGNIFICANT CHANGE UP (ref 98–110)
CO2 SERPL-SCNC: 25 MMOL/L — SIGNIFICANT CHANGE UP (ref 17–32)
CREAT SERPL-MCNC: 0.6 MG/DL — LOW (ref 0.7–1.5)
EGFR: 102 ML/MIN/1.73M2 — SIGNIFICANT CHANGE UP
EOSINOPHIL # BLD AUTO: 0.09 K/UL — SIGNIFICANT CHANGE UP (ref 0–0.7)
EOSINOPHIL NFR BLD AUTO: 0.8 % — SIGNIFICANT CHANGE UP (ref 0–8)
GLUCOSE BLDC GLUCOMTR-MCNC: 109 MG/DL — HIGH (ref 70–99)
GLUCOSE BLDC GLUCOMTR-MCNC: 84 MG/DL — SIGNIFICANT CHANGE UP (ref 70–99)
GLUCOSE BLDC GLUCOMTR-MCNC: 84 MG/DL — SIGNIFICANT CHANGE UP (ref 70–99)
GLUCOSE BLDC GLUCOMTR-MCNC: 90 MG/DL — SIGNIFICANT CHANGE UP (ref 70–99)
GLUCOSE BLDC GLUCOMTR-MCNC: 93 MG/DL — SIGNIFICANT CHANGE UP (ref 70–99)
GLUCOSE SERPL-MCNC: 85 MG/DL — SIGNIFICANT CHANGE UP (ref 70–99)
HCT VFR BLD CALC: 38.3 % — LOW (ref 42–52)
HGB BLD-MCNC: 12.7 G/DL — LOW (ref 14–18)
IMM GRANULOCYTES NFR BLD AUTO: 0.4 % — HIGH (ref 0.1–0.3)
LYMPHOCYTES # BLD AUTO: 18.4 % — LOW (ref 20.5–51.1)
LYMPHOCYTES # BLD AUTO: 2.09 K/UL — SIGNIFICANT CHANGE UP (ref 1.2–3.4)
MCHC RBC-ENTMCNC: 28.5 PG — SIGNIFICANT CHANGE UP (ref 27–31)
MCHC RBC-ENTMCNC: 33.2 G/DL — SIGNIFICANT CHANGE UP (ref 32–37)
MCV RBC AUTO: 86.1 FL — SIGNIFICANT CHANGE UP (ref 80–94)
MONOCYTES # BLD AUTO: 1.14 K/UL — HIGH (ref 0.1–0.6)
MONOCYTES NFR BLD AUTO: 10.1 % — HIGH (ref 1.7–9.3)
NEUTROPHILS # BLD AUTO: 7.94 K/UL — HIGH (ref 1.4–6.5)
NEUTROPHILS NFR BLD AUTO: 70 % — SIGNIFICANT CHANGE UP (ref 42.2–75.2)
NRBC # BLD: 0 /100 WBCS — SIGNIFICANT CHANGE UP (ref 0–0)
PLATELET # BLD AUTO: 223 K/UL — SIGNIFICANT CHANGE UP (ref 130–400)
PMV BLD: 12.2 FL — HIGH (ref 7.4–10.4)
POTASSIUM SERPL-MCNC: 3.9 MMOL/L — SIGNIFICANT CHANGE UP (ref 3.5–5)
POTASSIUM SERPL-SCNC: 3.9 MMOL/L — SIGNIFICANT CHANGE UP (ref 3.5–5)
PROT SERPL-MCNC: 5.9 G/DL — LOW (ref 6–8)
RBC # BLD: 4.45 M/UL — LOW (ref 4.7–6.1)
RBC # FLD: 13.1 % — SIGNIFICANT CHANGE UP (ref 11.5–14.5)
SODIUM SERPL-SCNC: 139 MMOL/L — SIGNIFICANT CHANGE UP (ref 135–146)
WBC # BLD: 11.33 K/UL — HIGH (ref 4.8–10.8)
WBC # FLD AUTO: 11.33 K/UL — HIGH (ref 4.8–10.8)

## 2023-09-14 PROCEDURE — 99233 SBSQ HOSP IP/OBS HIGH 50: CPT

## 2023-09-14 PROCEDURE — 99222 1ST HOSP IP/OBS MODERATE 55: CPT

## 2023-09-14 PROCEDURE — 99232 SBSQ HOSP IP/OBS MODERATE 35: CPT

## 2023-09-14 PROCEDURE — 99024 POSTOP FOLLOW-UP VISIT: CPT

## 2023-09-14 RX ORDER — METOPROLOL TARTRATE 50 MG
50 TABLET ORAL DAILY
Refills: 0 | Status: DISCONTINUED | OUTPATIENT
Start: 2023-09-14 | End: 2023-09-15

## 2023-09-14 RX ADMIN — SODIUM CHLORIDE 150 MILLILITER(S): 9 INJECTION, SOLUTION INTRAVENOUS at 15:57

## 2023-09-14 RX ADMIN — GABAPENTIN 100 MILLIGRAM(S): 400 CAPSULE ORAL at 21:10

## 2023-09-14 RX ADMIN — SODIUM CHLORIDE 150 MILLILITER(S): 9 INJECTION, SOLUTION INTRAVENOUS at 06:55

## 2023-09-14 RX ADMIN — PANTOPRAZOLE SODIUM 40 MILLIGRAM(S): 20 TABLET, DELAYED RELEASE ORAL at 06:55

## 2023-09-14 RX ADMIN — ENOXAPARIN SODIUM 90 MILLIGRAM(S): 100 INJECTION SUBCUTANEOUS at 06:57

## 2023-09-14 RX ADMIN — ATORVASTATIN CALCIUM 40 MILLIGRAM(S): 80 TABLET, FILM COATED ORAL at 21:10

## 2023-09-14 RX ADMIN — Medication 50 MILLIGRAM(S): at 06:57

## 2023-09-14 NOTE — PROGRESS NOTE ADULT - ASSESSMENT
73yo Male with PMHx of DM2, HLD, Afib, recent admission 8/28 -8/31 for gallstone pancreatitis (was scheduled for lap idania but developed afib w/RVR ) who presents with worsening RUQ and epigastric pain and found to have acute gallstone pancreatitis. Will need lap idania at some point, but requires medical optimization prior. Surgery consulted for lap idania planning. In the interim, will keep NPO/CLD, IVF, IV pain meds, and discuss any further testing required.     Pt seen with Dr. Marcelino:    #Acute Gallstone Pancreatitis   - may start clears  -per cardio:  Cardiac risk surgery intermediate.. Patient in past declined Adenocard thallium . He again declined.  -pt for robotic cholecystectomy on Friday 9/15  - cont holding eliquis   -NPO P MN   71yo Male with PMHx of DM2, HLD, Afib, recent admission 8/28 -8/31 for gallstone pancreatitis (was scheduled for lap idania but developed afib w/RVR ) who presents with worsening RUQ and epigastric pain and found to have acute gallstone pancreatitis. Will need lap idania at some point, but requires medical optimization prior. Surgery consulted for lap idania planning. In the interim, will keep NPO/CLD, IVF, IV pain meds, and discuss any further testing required.     Pt seen with Dr. Marcelino:    #Acute Gallstone Pancreatitis   - clears  - per cardio:  Cardiac risk surgery intermediate.. Patient in past declined Adenocard thallium. He again declined.  - pt for robotic cholecystectomy on Friday 9/15  - cont holding eliquis   - NPO P MN

## 2023-09-14 NOTE — PROGRESS NOTE ADULT - ASSESSMENT
72yMale pmh DL, DM2, heavy smoker (2-3 PPD), GERD, known gallstones, and history of pancreatitis, here for abd pain, being evaluated for pancreatitis.    Problem 1- acute interstitial pancreatitis - likely gallstone related vs other etiology - less likely drug induced (pt denied OTC or herbals or new meds that could explain pancreatitis) vs panc divisum malignancy vs less likely alcohol (pt denied)  3/3 elevated lipase, epigastric pain, imaging findings on CT  US w/ Cholelithiasis with gallbladder sludge visualized. No sonographic Garcia sign or gallbladder distention.  he had a recent HIDA that was concerning for acute cholecystitis  Since 8/28/2023, there is interval pancreatic duct dilatation 4.2mm PD  patient feeling 75% better  Rec  -plan is for OR Friday tentatively   -CT and RUQ Abdominal Ultrasound appreciated  -Aggressive IV hydration with lactated ringers, check daily weights   recommend bolus of 10 ml / kg x 1 followed by 1.5 ml/kg/hr maintenance therapy for now  -Strict Is and Os  -Check BUN and HCT daily goal is that both should go down  -IF BUN or HCT rise, please increase fluid  -EUS vs. MRI panc protocol in 8 weeks as an outpatient to rule out Pancreas divisum and Pancreatic neoplasm  -surgery evaluation for cholecystectomy preferably prior to discharge to prevent future presentations from symptomatic gallstones    Problem 2-Hepatic steatosis  Rec  -Dietary and lifestyle modifications advised   -outpatient fibroscan   -outpatient can check hepatitis panel  -outpatient workup

## 2023-09-14 NOTE — CONSULT NOTE ADULT - SUBJECTIVE AND OBJECTIVE BOX
INFECTIOUS DISEASE CONSULT NOTE    Patient is a 72y old  Male who presents with a chief complaint of Gallstone Pancreatitis (13 Sep 2023 18:05)    HPI:  71yo Male with PMHx of DM2, HLD, Afib who presents with worsening RUQ and epigastric pain. Pt originally admitted to [facility] from 8/28-/31 for acute cholecystitis. Surgery was consulted and was planning on a Lap idania, however, course was complicated by new Afib with RVR which caused his surgery to get delayed for medical optimization. Patient declined to stay for further optimization prior to surgery and after shared-decision making, opted to pursue outpatient follow up with his outpatient providers. He reports feeling well up until day prior to presentation when pain in the RUQ, epigastrum returned. Descibes the pain as a 9/10, "tightness" radiating to his back associated with loss of appetite and dry heaves. Denies fever, chills, chest pain, SOB, nausea, vomiting, diarrhea, constipation.     In the ED, afebrile, hypertensive to SBP 170s, otherwise hemodynamically stable. Labs were notable for WBC 16, Lipase 957, Lactate 2.7. RUQUS with cholelithiasis and gallbladder sludge with interval development of pancreatic duct dilation. CT A/P showed acute pancreatitis with no necrosis or fluid collection. Admitted to medicine for further management.  (12 Sep 2023 16:42)         Prior hospital charts reviewed [Yes]  Primary team notes reviewed [Yes]  Other consultant notes reviewed [Yes]    REVIEW OF SYSTEMS:  CONSTITUTIONAL: No fever or chills  HEAD: No lesion on scalp  EYES: No visual disturbance  ENT: No sore throat  RESPIRATORY: No cough, no shortness of breath  CARDIOVASCULAR: No chest pain or palpitations  GASTROINTESTINAL: No abdominal or epigastric pain  GENITOURINARY: No dysuria  NEUROLOGICAL: No headache/dizziness  MUSCULOSKELETAL: No joint pain, erythema, or swelling; no back pain  SKIN: No itching, rashes  All other ROS negative except noted above    PAST MEDICAL & SURGICAL HISTORY:  High cholesterol      Elevated cholesterol with high triglycerides      Pancreatitis      Acid reflux      BP (high blood pressure)      No significant past surgical history          SOCIAL HISTORY:  - Born in _____, [place] in 19XX  - Currently working as / Retired  - Lives with _____; no pets  - No recent travel  - Denies tobacco use  - Denies alcohol use  - Denies illicit drug use  - Currently sexually active, has one male/female sexual partner    FAMILY HISTORY:      Allergies:  No Known Allergies      ANTIMICROBIALS:      ANTIMICROBIALS (past 90 days):  MEDICATIONS  (STANDING):  cefoTEtan  IVPB   100 mL/Hr IV Intermittent (09-12-23 @ 13:04)        OTHER MEDS:   MEDICATIONS  (STANDING):  acetaminophen     Tablet .. 650 every 6 hours PRN  aluminum hydroxide/magnesium hydroxide/simethicone Suspension 30 every 4 hours PRN  atorvastatin 40 at bedtime  dextrose 50% Injectable 25 once  dextrose 50% Injectable 12.5 once  dextrose 50% Injectable 25 once  dextrose Oral Gel 15 once PRN  enoxaparin Injectable 90 every 12 hours  gabapentin 100 at bedtime  glucagon  Injectable 1 once  influenza  Vaccine (HIGH DOSE) 0.7 once  insulin lispro (ADMELOG) corrective regimen sliding scale  every 6 hours  melatonin 3 at bedtime PRN  metoprolol succinate ER 50 daily  morphine  - Injectable 4 every 4 hours PRN  ondansetron Injectable 4 every 8 hours PRN  oxycodone    5 mG/acetaminophen 325 mG 1 every 6 hours PRN  pantoprazole    Tablet 40 before breakfast      VITALS:  Vital Signs Last 24 Hrs  T(F): 98.2 (09-14-23 @ 04:16), Max: 98.7 (09-13-23 @ 20:45)    Vital Signs Last 24 Hrs  HR: 60 (09-14-23 @ 04:16) (57 - 60)  BP: 140/70 (09-14-23 @ 04:16) (138/69 - 179/84)  RR: 18 (09-14-23 @ 04:16)  SpO2: 97% (09-13-23 @ 14:04) (97% - 97%)  Wt(kg): --    EXAM:  GENERAL: NAD, lying in bed  HEAD: No head lesions  EYES: Conjunctiva pink and cornea white  EAR, NOSE, MOUTH, THROAT: Normal external ears and nose, no discharges; moist mucous membranes  NECK: Supple, nontender to palpation; no JVD  RESPIRATORY: Clear to auscultation bilaterally  CARDIOVASCULAR: S1 S2  GASTROINTESTINAL: Soft, nontender, nondistended; normoactive bowel sounds  EXTREMITIES: No clubbing, cyanosis, or petal edema  NERVOUS SYSTEM: Alert and oriented to person, time, place and situation, speech clear. No focal deficits   MUSCULOSKELETAL: No joint erythema, swelling or pain  SKIN: No rashes or lesions, no superficial thrombophlebitis  PSYCH: Normal affect    Labs:                        12.7   11.33 )-----------( 223      ( 14 Sep 2023 05:48 )             38.3     09-14    139  |  101  |  8<L>  ----------------------------<  85  3.9   |  25  |  0.6<L>    Ca    10.0      14 Sep 2023 05:48    TPro  5.9<L>  /  Alb  3.9  /  TBili  0.6  /  DBili  x   /  AST  12  /  ALT  19  /  AlkPhos  30  09-14      WBC Trend:  WBC Count: 11.33 (09-14-23 @ 05:48)  WBC Count: 14.80 (09-13-23 @ 04:30)  WBC Count: 16.44 (09-12-23 @ 09:45)      Auto Neutrophil #: 7.94 K/uL (09-14-23 @ 05:48)  Auto Neutrophil #: 12.68 K/uL (09-12-23 @ 09:45)  Auto Neutrophil #: 4.73 K/uL (08-31-23 @ 06:25)  Auto Neutrophil #: 8.31 K/uL (08-28-23 @ 10:30)      Creatine Trend:  Creatinine: 0.6 (09-14)  Creatinine: 0.6 (09-13)  Creatinine: 0.7 (09-12)      Liver Biochemical Testing Trend:  Alanine Aminotransferase (ALT/SGPT): 19 (09-14)  Alanine Aminotransferase (ALT/SGPT): 25 (09-13)  Alanine Aminotransferase (ALT/SGPT): 34 (09-12)  Alanine Aminotransferase (ALT/SGPT): 46 *H* (08-31)  Alanine Aminotransferase (ALT/SGPT): 28 (08-29)  Aspartate Aminotransferase (AST/SGOT): 12 (09-14-23 @ 05:48)  Aspartate Aminotransferase (AST/SGOT): 13 (09-13-23 @ 04:30)  Aspartate Aminotransferase (AST/SGOT): 24 (09-12-23 @ 09:45)  Aspartate Aminotransferase (AST/SGOT): 35 (08-31-23 @ 06:25)  Aspartate Aminotransferase (AST/SGOT): 27 (08-29-23 @ 06:31)  Bilirubin Total: 0.6 (09-14)  Bilirubin Total: 0.6 (09-13)  Bilirubin Total: 0.8 (09-12)  Bilirubin Total: 0.6 (08-31)  Bilirubin Total: 0.5 (08-29)      Trend LDH      Auto Eosinophil %: 0.8 % (09-14-23 @ 05:48)  Auto Eosinophil %: 0.5 % (09-12-23 @ 09:45)      Urinalysis Basic - ( 14 Sep 2023 05:48 )    Color: x / Appearance: x / SG: x / pH: x  Gluc: 85 mg/dL / Ketone: x  / Bili: x / Urobili: x   Blood: x / Protein: x / Nitrite: x   Leuk Esterase: x / RBC: x / WBC x   Sq Epi: x / Non Sq Epi: x / Bacteria: x        MICROBIOLOGY:      Lactate, Blood: 1.9 (09-13 @ 11:00)  Lactate, Blood: 2.7 (09-12 @ 09:45)    A1C with Estimated Average Glucose Result: 5.9 % (08-29-23 @ 06:31)      RADIOLOGY:  imaging below personally reviewed    < from: CT Abdomen and Pelvis w/ IV Cont (09.12.23 @ 11:04) >  Findings:    01. LIVER: Normal morphology. No focal lesion. Periportal edema is noted   tracking into the left hepatic lobe, may be due to pancreatic   inflammation.  02. SPLEEN: Splenomegaly measuring 14.1 cm (series 6 on image 79),   previously 14.6 cm.  03. PANCREAS: Pancreatic inflammatory changes surrounding the head and   neck. No evidence of pancreatic necrosis or drainable collection. No   definite pancreatic head lesion. No duct dilation. Inflammatory changes   extend to involve the adjacent duodenum and rio hepatis.  04. GALLBLADDER/BILIARY TREE: Cholelithiasis. CBD measures 7 mm which is   within normal limits for age (series 6 on image 60).  05. ADRENALS: Normal.  06. KIDNEYS: Symmetric enhancement. No hydronephrosis or renal stone.   Bilateral renal cysts and hypodensities too small to further characterize.  07. LYMPHADENOPATHY/RETROPERITONEUM: Peripancreatic lymph nodes measuring   up to 1.3 cm (series 301 image 32), likely reactive.  08. BOWEL: No bowel obstruction. Unremarkable appendix.  09. PELVIC VISCERA: Prostate measures 4.6 cm (series 301 image 114).   Unremarkable urinary bladder.  10. PELVIC LYMPH NODES: No lymphadenopathy.  11. VASCULATURE: Normal caliber abdominal aorta. Calcified   atherosclerotic disease of the aorta and its branches.  12. PERITONEUM/ABDOMINAL WALL: No gross ascites  13. SKELETAL: Degenerative changes. Chronic T12 compression deformity.  14. LUNG BASES: Small bilateral atelectasis.    IMPRESSION:    Acute pancreatitis.    No evidence for pancreatic necrosis or drainable collection. No definite   pancreatic head lesion. Recommend continued attention on follow-up.    Additional findings are detailed in the body the report.    < end of copied text >      < from: US Abdomen Upper Quadrant Right (09.12.23 @ 10:48) >  IMPRESSION:  Cholelithiasis with gallbladder sludge visualized.No sonographic Garcia   sign or gallbladder distention.    Since 8/28/2023, there is interval pancreatic duct dilatation.  Hepatic steatosis.    < end of copied text >   INFECTIOUS DISEASE CONSULT NOTE    Patient is a 72y old  Male who presents with a chief complaint of Gallstone Pancreatitis (13 Sep 2023 18:05)    HPI:  73yo Male with PMHx of DM2, HLD, Afib who presents with worsening RUQ and epigastric pain. Pt originally admitted to [facility] from 8/28-/31 for acute cholecystitis. Surgery was consulted and was planning on a Lap idania, however, course was complicated by new Afib with RVR which caused his surgery to get delayed for medical optimization. Patient declined to stay for further optimization prior to surgery and after shared-decision making, opted to pursue outpatient follow up with his outpatient providers. He reports feeling well up until day prior to presentation when pain in the RUQ, epigastrum returned. Descibes the pain as a 9/10, "tightness" radiating to his back associated with loss of appetite and dry heaves. Denies fever, chills, chest pain, SOB, nausea, vomiting, diarrhea, constipation.     In the ED, afebrile, hypertensive to SBP 170s, otherwise hemodynamically stable. Labs were notable for WBC 16, Lipase 957, Lactate 2.7. RUQUS with cholelithiasis and gallbladder sludge with interval development of pancreatic duct dilation. CT A/P showed acute pancreatitis with no necrosis or fluid collection. Admitted to medicine for further management.  (12 Sep 2023 16:42)       Prior hospital charts reviewed [Yes]  Primary team notes reviewed [Yes]  Other consultant notes reviewed [Yes]    REVIEW OF SYSTEMS:  CONSTITUTIONAL: No fever or chills  HEAD: No lesion on scalp  EYES: No visual disturbance  ENT: No sore throat  RESPIRATORY: No cough, no shortness of breath  CARDIOVASCULAR: No chest pain or palpitations  GASTROINTESTINAL: No abdominal or epigastric pain  GENITOURINARY: No dysuria  NEUROLOGICAL: No headache/dizziness  MUSCULOSKELETAL: No joint pain, erythema, or swelling; no back pain  SKIN: No itching, rashes  All other ROS negative except noted above    PAST MEDICAL & SURGICAL HISTORY:  High cholesterol      Elevated cholesterol with high triglycerides      Pancreatitis      Acid reflux      BP (high blood pressure)      No significant past surgical history          SOCIAL HISTORY:  - No recent travel  - Denies tobacco use  - Denies alcohol use  - Denies illicit drug use    FAMILY HISTORY:  No family history of gallstone    Allergies:  No Known Allergies      ANTIMICROBIALS:      ANTIMICROBIALS (past 90 days):  MEDICATIONS  (STANDING):  cefoTEtan  IVPB   100 mL/Hr IV Intermittent (09-12-23 @ 13:04)        OTHER MEDS:   MEDICATIONS  (STANDING):  acetaminophen     Tablet .. 650 every 6 hours PRN  aluminum hydroxide/magnesium hydroxide/simethicone Suspension 30 every 4 hours PRN  atorvastatin 40 at bedtime  dextrose 50% Injectable 25 once  dextrose 50% Injectable 12.5 once  dextrose 50% Injectable 25 once  dextrose Oral Gel 15 once PRN  enoxaparin Injectable 90 every 12 hours  gabapentin 100 at bedtime  glucagon  Injectable 1 once  influenza  Vaccine (HIGH DOSE) 0.7 once  insulin lispro (ADMELOG) corrective regimen sliding scale  every 6 hours  melatonin 3 at bedtime PRN  metoprolol succinate ER 50 daily  morphine  - Injectable 4 every 4 hours PRN  ondansetron Injectable 4 every 8 hours PRN  oxycodone    5 mG/acetaminophen 325 mG 1 every 6 hours PRN  pantoprazole    Tablet 40 before breakfast      VITALS:  Vital Signs Last 24 Hrs  T(F): 98.2 (09-14-23 @ 04:16), Max: 98.7 (09-13-23 @ 20:45)    Vital Signs Last 24 Hrs  HR: 60 (09-14-23 @ 04:16) (57 - 60)  BP: 140/70 (09-14-23 @ 04:16) (138/69 - 179/84)  RR: 18 (09-14-23 @ 04:16)  SpO2: 97% (09-13-23 @ 14:04) (97% - 97%)  Wt(kg): --    EXAM:  GENERAL: NAD, lying in bed  HEAD: No head lesions  EYES: Conjunctiva pink and cornea white  EAR, NOSE, MOUTH, THROAT: Normal external ears and nose, no discharges; moist mucous membranes  NECK: Supple, nontender to palpation; no JVD  RESPIRATORY: Clear to auscultation bilaterally  CARDIOVASCULAR: S1 S2  GASTROINTESTINAL: Soft, nontender, nondistended; normoactive bowel sounds  EXTREMITIES: No clubbing, cyanosis, or petal edema  NERVOUS SYSTEM: Alert and oriented to person, time, place and situation, speech clear. No focal deficits   MUSCULOSKELETAL: No joint erythema, swelling or pain  SKIN: No rashes or lesions, no superficial thrombophlebitis  PSYCH: Normal affect    Labs:                        12.7   11.33 )-----------( 223      ( 14 Sep 2023 05:48 )             38.3     09-14    139  |  101  |  8<L>  ----------------------------<  85  3.9   |  25  |  0.6<L>    Ca    10.0      14 Sep 2023 05:48    TPro  5.9<L>  /  Alb  3.9  /  TBili  0.6  /  DBili  x   /  AST  12  /  ALT  19  /  AlkPhos  30  09-14      WBC Trend:  WBC Count: 11.33 (09-14-23 @ 05:48)  WBC Count: 14.80 (09-13-23 @ 04:30)  WBC Count: 16.44 (09-12-23 @ 09:45)      Auto Neutrophil #: 7.94 K/uL (09-14-23 @ 05:48)  Auto Neutrophil #: 12.68 K/uL (09-12-23 @ 09:45)  Auto Neutrophil #: 4.73 K/uL (08-31-23 @ 06:25)  Auto Neutrophil #: 8.31 K/uL (08-28-23 @ 10:30)      Creatine Trend:  Creatinine: 0.6 (09-14)  Creatinine: 0.6 (09-13)  Creatinine: 0.7 (09-12)      Liver Biochemical Testing Trend:  Alanine Aminotransferase (ALT/SGPT): 19 (09-14)  Alanine Aminotransferase (ALT/SGPT): 25 (09-13)  Alanine Aminotransferase (ALT/SGPT): 34 (09-12)  Alanine Aminotransferase (ALT/SGPT): 46 *H* (08-31)  Alanine Aminotransferase (ALT/SGPT): 28 (08-29)  Aspartate Aminotransferase (AST/SGOT): 12 (09-14-23 @ 05:48)  Aspartate Aminotransferase (AST/SGOT): 13 (09-13-23 @ 04:30)  Aspartate Aminotransferase (AST/SGOT): 24 (09-12-23 @ 09:45)  Aspartate Aminotransferase (AST/SGOT): 35 (08-31-23 @ 06:25)  Aspartate Aminotransferase (AST/SGOT): 27 (08-29-23 @ 06:31)  Bilirubin Total: 0.6 (09-14)  Bilirubin Total: 0.6 (09-13)  Bilirubin Total: 0.8 (09-12)  Bilirubin Total: 0.6 (08-31)  Bilirubin Total: 0.5 (08-29)      Trend LDH      Auto Eosinophil %: 0.8 % (09-14-23 @ 05:48)  Auto Eosinophil %: 0.5 % (09-12-23 @ 09:45)      Urinalysis Basic - ( 14 Sep 2023 05:48 )    Color: x / Appearance: x / SG: x / pH: x  Gluc: 85 mg/dL / Ketone: x  / Bili: x / Urobili: x   Blood: x / Protein: x / Nitrite: x   Leuk Esterase: x / RBC: x / WBC x   Sq Epi: x / Non Sq Epi: x / Bacteria: x        MICROBIOLOGY:      Lactate, Blood: 1.9 (09-13 @ 11:00)  Lactate, Blood: 2.7 (09-12 @ 09:45)    A1C with Estimated Average Glucose Result: 5.9 % (08-29-23 @ 06:31)      RADIOLOGY:  imaging below personally reviewed    < from: CT Abdomen and Pelvis w/ IV Cont (09.12.23 @ 11:04) >  Findings:    01. LIVER: Normal morphology. No focal lesion. Periportal edema is noted   tracking into the left hepatic lobe, may be due to pancreatic   inflammation.  02. SPLEEN: Splenomegaly measuring 14.1 cm (series 6 on image 79),   previously 14.6 cm.  03. PANCREAS: Pancreatic inflammatory changes surrounding the head and   neck. No evidence of pancreatic necrosis or drainable collection. No   definite pancreatic head lesion. No duct dilation. Inflammatory changes   extend to involve the adjacent duodenum and rio hepatis.  04. GALLBLADDER/BILIARY TREE: Cholelithiasis. CBD measures 7 mm which is   within normal limits for age (series 6 on image 60).  05. ADRENALS: Normal.  06. KIDNEYS: Symmetric enhancement. No hydronephrosis or renal stone.   Bilateral renal cysts and hypodensities too small to further characterize.  07. LYMPHADENOPATHY/RETROPERITONEUM: Peripancreatic lymph nodes measuring   up to 1.3 cm (series 301 image 32), likely reactive.  08. BOWEL: No bowel obstruction. Unremarkable appendix.  09. PELVIC VISCERA: Prostate measures 4.6 cm (series 301 image 114).   Unremarkable urinary bladder.  10. PELVIC LYMPH NODES: No lymphadenopathy.  11. VASCULATURE: Normal caliber abdominal aorta. Calcified   atherosclerotic disease of the aorta and its branches.  12. PERITONEUM/ABDOMINAL WALL: No gross ascites  13. SKELETAL: Degenerative changes. Chronic T12 compression deformity.  14. LUNG BASES: Small bilateral atelectasis.    IMPRESSION:    Acute pancreatitis.    No evidence for pancreatic necrosis or drainable collection. No definite   pancreatic head lesion. Recommend continued attention on follow-up.    Additional findings are detailed in the body the report.    < end of copied text >      < from: US Abdomen Upper Quadrant Right (09.12.23 @ 10:48) >  IMPRESSION:  Cholelithiasis with gallbladder sludge visualized.No sonographic Garcia   sign or gallbladder distention.    Since 8/28/2023, there is interval pancreatic duct dilatation.  Hepatic steatosis.    < end of copied text >

## 2023-09-14 NOTE — PROGRESS NOTE ADULT - SUBJECTIVE AND OBJECTIVE BOX
SUBJECTIVE:    Patient is a 72y old Male who presents with a chief complaint of Gallstone Pancreatitis (14 Sep 2023 15:13)    Currently admitted to medicine with the primary diagnosis of Pancreatitis       Today is hospital day 2d. This morning he is resting comfortably in bed and reports no new issues or overnight events.     ROS:   CONSTITUTIONAL: No weakness, fevers or chills   EYES/ENT: No visual changes; No vertigo or throat pain   NECK: No pain or stiffness   RESPIRATORY: No cough, wheezing, hemoptysis; No shortness of breath   CARDIOVASCULAR: No chest pain or palpitations   GASTROINTESTINAL: No abdominal or epigastric pain. No nausea, vomiting, or hematemesis; No diarrhea or constipation. No melena or hematochezia.  GENITOURINARY: No dysuria, frequency or hematuria  NEUROLOGICAL: No numbness or weakness        PAST MEDICAL & SURGICAL HISTORY  High cholesterol    Elevated cholesterol with high triglycerides    Pancreatitis    Acid reflux    BP (high blood pressure)    No significant past surgical history      SOCIAL HISTORY:    ALLERGIES:  No Known Allergies    MEDICATIONS:  STANDING MEDICATIONS  atorvastatin 40 milliGRAM(s) Oral at bedtime  dextrose 5%. 1000 milliLiter(s) IV Continuous <Continuous>  dextrose 5%. 1000 milliLiter(s) IV Continuous <Continuous>  dextrose 50% Injectable 25 Gram(s) IV Push once  dextrose 50% Injectable 25 Gram(s) IV Push once  dextrose 50% Injectable 12.5 Gram(s) IV Push once  enoxaparin Injectable 90 milliGRAM(s) SubCutaneous every 12 hours  gabapentin 100 milliGRAM(s) Oral at bedtime  glucagon  Injectable 1 milliGRAM(s) IntraMuscular once  influenza  Vaccine (HIGH DOSE) 0.7 milliLiter(s) IntraMuscular once  insulin lispro (ADMELOG) corrective regimen sliding scale   SubCutaneous every 6 hours  lactated ringers. 1000 milliLiter(s) IV Continuous <Continuous>  metoprolol succinate ER 50 milliGRAM(s) Oral daily  pantoprazole    Tablet 40 milliGRAM(s) Oral before breakfast    PRN MEDICATIONS  acetaminophen     Tablet .. 650 milliGRAM(s) Oral every 6 hours PRN  aluminum hydroxide/magnesium hydroxide/simethicone Suspension 30 milliLiter(s) Oral every 4 hours PRN  dextrose Oral Gel 15 Gram(s) Oral once PRN  melatonin 3 milliGRAM(s) Oral at bedtime PRN  morphine  - Injectable 4 milliGRAM(s) IV Push every 4 hours PRN  ondansetron Injectable 4 milliGRAM(s) IV Push every 8 hours PRN  oxycodone    5 mG/acetaminophen 325 mG 1 Tablet(s) Oral every 6 hours PRN    VITALS:   T(F): 96.4  HR: 52  BP: 163/79  RR: 18  SpO2: --    LABS:  Negative for smoking/alcohol/drug use.                         12.7   11.33 )-----------( 223      ( 14 Sep 2023 05:48 )             38.3     09-14    139  |  101  |  8<L>  ----------------------------<  85  3.9   |  25  |  0.6<L>    Ca    10.0      14 Sep 2023 05:48    TPro  5.9<L>  /  Alb  3.9  /  TBili  0.6  /  DBili  x   /  AST  12  /  ALT  19  /  AlkPhos  30  09-14    PT/INR - ( 13 Sep 2023 04:30 )   PT: 16.80 sec;   INR: 1.46 ratio         PTT - ( 13 Sep 2023 04:30 )  PTT:37.9 sec  Urinalysis Basic - ( 14 Sep 2023 05:48 )    Color: x / Appearance: x / SG: x / pH: x  Gluc: 85 mg/dL / Ketone: x  / Bili: x / Urobili: x   Blood: x / Protein: x / Nitrite: x   Leuk Esterase: x / RBC: x / WBC x   Sq Epi: x / Non Sq Epi: x / Bacteria: x                RADIOLOGY:    PHYSICAL EXAM:  GEN: No acute distress  HEENT: normocephalic, atraumatic, aniceteric  LUNGS: bl breath sounds   HEART: S1/S2 present. RRR, no murmurs  ABD: Soft, non-tender, non-distended. Bowel sounds present  EXT: warm   NEURO: AAOX3, normal affect      ASSESSMENT AND PLAN:    71yo Male with PMHx of DM2, HLD, Afib on eliquis at home  who presents with worsening RUQ and epigastric pain and found to have acute gallstone pancreatitis. Will need lap idania at some point, but requires medical optimization prior. Surgery consulted for lap idania planning. In the interim, will keep NPO/CLD, IVF, IV pain meds, and discuss any further testing required.     #Acute Gallstone Pancreatitis   # H/O Acute cholecystitis 8/23 sp Augmentin course   - admission 8/28-8/31 - lap idania was cancelled given new onset afib with rvr - pt prefered outpatient follow up and did not risk stratification inpatient   - no sepsis present on admission   - denies alcohol abuse /   - lipase 957--> 799   - CT AP - acute pancreatitis ; no pancreatic necrosis/ drainable collection ; no definite pancreatic head lesion   - RUQ sono - Cholelithiasis with gallbladder sludge visualized. No sonographic Garcia   sign or gallbladder distention. Since 8/28/2023, there is interval pancreatic duct dilatation.  -pain control , zofran prn for nausea   - Surgery eval appreciated- possible lap idania on Friday 9/15 - cardio risk stratification appreciated - intermediate (declined stress test)  - METS > 4 , intermediate - to high risk for intermediate risk procedure   - GI following - EUS vs. MRI panc protocol in 8 weeks as an outpatient to rule out Pancreas divisum and Pancreatic neoplasm  - IVF , pain control     # H/O Chronic Afib , rate controlled   - on Metop XL 25mg at home   - HOLD Eliquis 5mg BID (last dose 9/13 AM)   - hold lovenox for OR tomorrow   - chadsvasc - 3     #DM2- ISS - hold metformin and pioglitazone     #HLD - Cont atorvastatin 40mg    dvt/gi ppx-diet- NPO after MN  dispo: acute  handoff:  lap idania 9/15

## 2023-09-14 NOTE — PROGRESS NOTE ADULT - SUBJECTIVE AND OBJECTIVE BOX
72yMale  Being followed for gallstone pancreatitis   Interval history: Patient denies nausea, vomiting, hematemesis, melena, blood in stool, diarrhea, constipation, abdominal pain. Patient tolerating clear liquids, feeling 75% better.      PAST MEDICAL & SURGICAL HISTORY:   High cholesterol      Elevated cholesterol with high triglycerides      Pancreatitis      Acid reflux      BP (high blood pressure)      No significant past surgical history                Social History: No smoking. No alcohol. No illegal drug use.            MEDICATIONS  (STANDING):  atorvastatin 40 milliGRAM(s) Oral at bedtime  dextrose 5%. 1000 milliLiter(s) (100 mL/Hr) IV Continuous <Continuous>  dextrose 5%. 1000 milliLiter(s) (50 mL/Hr) IV Continuous <Continuous>  dextrose 50% Injectable 25 Gram(s) IV Push once  dextrose 50% Injectable 12.5 Gram(s) IV Push once  dextrose 50% Injectable 25 Gram(s) IV Push once  enoxaparin Injectable 90 milliGRAM(s) SubCutaneous every 12 hours  gabapentin 100 milliGRAM(s) Oral at bedtime  glucagon  Injectable 1 milliGRAM(s) IntraMuscular once  influenza  Vaccine (HIGH DOSE) 0.7 milliLiter(s) IntraMuscular once  insulin lispro (ADMELOG) corrective regimen sliding scale   SubCutaneous every 6 hours  lactated ringers. 1000 milliLiter(s) (150 mL/Hr) IV Continuous <Continuous>  metoprolol succinate ER 50 milliGRAM(s) Oral daily  pantoprazole    Tablet 40 milliGRAM(s) Oral before breakfast    MEDICATIONS  (PRN):  acetaminophen     Tablet .. 650 milliGRAM(s) Oral every 6 hours PRN Temp greater or equal to 38C (100.4F), Mild Pain (1 - 3)  aluminum hydroxide/magnesium hydroxide/simethicone Suspension 30 milliLiter(s) Oral every 4 hours PRN Dyspepsia  dextrose Oral Gel 15 Gram(s) Oral once PRN Blood Glucose LESS THAN 70 milliGRAM(s)/deciliter  melatonin 3 milliGRAM(s) Oral at bedtime PRN Insomnia  morphine  - Injectable 4 milliGRAM(s) IV Push every 4 hours PRN Severe Pain (7 - 10)  ondansetron Injectable 4 milliGRAM(s) IV Push every 8 hours PRN Nausea and/or Vomiting  oxycodone    5 mG/acetaminophen 325 mG 1 Tablet(s) Oral every 6 hours PRN Moderate Pain (4 - 6)      Allergies:   No Known Allergies            REVIEW OF SYSTEMS:  General:  No weight loss, fevers, or chills.  Eyes:  No reported pain or visual changes  ENT:  No sore throat or runny nose.  NECK: No stiffness   CV:  No chest pain or palpitations.  Resp:  No shortness of breath, cough  GI:  No abdominal pain, nausea, vomiting, dysphagia, diarrhea or constipation. No rectal bleeding, melena, or hematemesis.  Muscle:  No aches or weakness  Neuro:  No tingling, numbness       VITAL SIGNS:   T(F): 98.2 (09-14-23 @ 04:16), Max: 98.7 (09-13-23 @ 20:45)  HR: 60 (09-14-23 @ 04:16) (57 - 60)  BP: 140/70 (09-14-23 @ 04:16) (138/69 - 179/84)  RR: 18 (09-14-23 @ 04:16) (16 - 18)  SpO2: 97% (09-13-23 @ 14:04) (97% - 97%)    PHYSICAL EXAM:  GENERAL: AAOx3, no acute distress.  HEAD:  Atraumatic, Normocephalic  EYES: conjunctiva and sclera clear  NECK: Supple, no JVD or thyromegaly  CHEST/LUNG: Clear to auscultation bilaterally; No wheeze, rhonchi, or rales  HEART: Regular rate and rhythm; normal S1, S2, No murmurs.  ABDOMEN: Soft, nontender, nondistended; Bowel sounds present  NEUROLOGY: No asterixis or tremor.   SKIN: Intact, no jaundice            LABS:                        12.7   11.33 )-----------( 223      ( 14 Sep 2023 05:48 )             38.3     09-14    139  |  101  |  8<L>  ----------------------------<  85  3.9   |  25  |  0.6<L>    Ca    10.0      14 Sep 2023 05:48    TPro  5.9<L>  /  Alb  3.9  /  TBili  0.6  /  DBili  x   /  AST  12  /  ALT  19  /  AlkPhos  30  09-14    LIVER FUNCTIONS - ( 14 Sep 2023 05:48 )  Alb: 3.9 g/dL / Pro: 5.9 g/dL / ALK PHOS: 30 U/L / ALT: 19 U/L / AST: 12 U/L / GGT: x           PT/INR - ( 13 Sep 2023 04:30 )   PT: 16.80 sec;   INR: 1.46 ratio         PTT - ( 13 Sep 2023 04:30 )  PTT:37.9 sec    IMAGING:    < from: CT Abdomen and Pelvis w/ IV Cont (09.12.23 @ 11:04) >    ACC: [id number] EXAM:  CT ABDOMEN AND PELVIS IC   ORDERED BY: MARIBEL COLON     PROCEDURE DATE:  09/12/2023          INTERPRETATION:  Clinical Indication: Abdominal pain.    Technique: Multidetector-row CT images of the abdomen and pelvis were   obtained from the xiphoid through the symphysis pubis following the   administration of intravenous contrast. No oral contrast was   administered.  Coronal and sagittal reconstructions were performed.    Contrast: 95 cc Omnipaque 350 non-ionic intravenouscontrast.    Comparison: CT abdomen pelvis 8/28/2023. Abdominal ultrasound 9/12/2023    Findings:    01. LIVER: Normal morphology. No focal lesion. Periportal edema is noted   tracking into the left hepatic lobe, may be due to pancreatic   inflammation.  02. SPLEEN: Splenomegaly measuring 14.1 cm (series 6 on image 79),   previously 14.6 cm.  03. PANCREAS: Pancreatic inflammatory changes surrounding the head and   neck. No evidence of pancreatic necrosis or drainable collection. No   definite pancreatic head lesion. No duct dilation. Inflammatory changes   extend to involve the adjacent duodenum and rio hepatis.  04. GALLBLADDER/BILIARY TREE: Cholelithiasis. CBD measures 7 mm which is   within normal limits for age (series 6 on image 60).  05. ADRENALS: Normal.  06. KIDNEYS: Symmetric enhancement. No hydronephrosis or renal stone.   Bilateral renal cysts and hypodensities too small to further characterize.  07. LYMPHADENOPATHY/RETROPERITONEUM: Peripancreatic lymph nodes measuring   up to 1.3 cm (series 301 image 32), likely reactive.  08. BOWEL: No bowel obstruction. Unremarkable appendix.  09. PELVIC VISCERA: Prostate measures 4.6 cm (series 301 image 114).   Unremarkable urinary bladder.  10. PELVIC LYMPH NODES: No lymphadenopathy.  11. VASCULATURE: Normal caliber abdominal aorta. Calcified   atherosclerotic disease of the aorta and its branches.  12. PERITONEUM/ABDOMINAL WALL: No gross ascites  13. SKELETAL: Degenerative changes. Chronic T12 compression deformity.  14. LUNG BASES: Small bilateral atelectasis.    IMPRESSION:    Acute pancreatitis.    No evidence for pancreatic necrosis or drainable collection. No definite   pancreatic head lesion. Recommend continued attention on follow-up.    Additional findings are detailed in the body the report.      --- End of Report ---            CHELSEA HOLLOWAY MD; Attending Radiologist  This document has been electronically signed. Sep 12 2023  1:20PM    < end of copied text >

## 2023-09-14 NOTE — CONSULT NOTE ADULT - ASSESSMENT
72 years old male with PMHx of DM2, HLD, Afib who presents with worsening RUQ and epigastric pain.    ID is consulted for pancreatitis  Afebrile on admission, with leukocytosis  Lactic acidosis  Lipase 957 -> 799  US RUQ showed gallstones with sludges, no signs of acute idania  CT A/P showed acute pancreatitis    Antibiotics:  Cefotetan 9/12      IMPRESSION:      RECOMMENDATIONS:        * THIS IS AN INCOMPLETE NOTE. FINAL RECOMMENDATION IS PENDING *   72 years old male with PMHx of DM2, HLD, Afib who presents with worsening RUQ and epigastric pain.    ID is consulted for pancreatitis  Afebrile on admission, with leukocytosis  Lactic acidosis  Lipase 957 -> 799  US RUQ showed gallstones with sludges, no signs of acute idania  CT A/P showed acute pancreatitis    Antibiotics:  Cefotetan 9/12    Leukocytosis is likely 2/2 inflammation    IMPRESSION:      RECOMMENDATIONS:        * THIS IS AN INCOMPLETE NOTE. FINAL RECOMMENDATION IS PENDING *   72 years old male with PMHx of DM2, HLD, Afib who presents with worsening RUQ and epigastric pain.    ID is consulted for pancreatitis  Afebrile on admission, with leukocytosis  Lactic acidosis  Lipase 957 -> 799  US RUQ showed gallstones with sludges, no signs of acute idania  CT A/P showed acute pancreatitis    Antibiotics:  Cefotetan 9/12    Leukocytosis is likely 2/2 inflammation  Patient is non-toxic appearing  Pending cholecystectomy with surgery on 9/15      IMPRESSION:  Leukocytosis  Acute gallstone pancreatitis  Cholelithiasis    RECOMMENDATIONS:  - Monitor off abx for now  - If patient spikes fever, obtain 2 sets of BCx and start IV ceftriaxone 1gm q24hrs and IV Flagyl 500mg q12hrs  - Pending cholecystectomy from Surgery  - Trend WBC, fever curve, transaminases, creatinine daily      Amy Palafox D.O.  Attending Physician  Division of Infectious Diseases  [facility] - [facility]  Please contact me via [employer] Teams

## 2023-09-14 NOTE — PROGRESS NOTE ADULT - SUBJECTIVE AND OBJECTIVE BOX
S; Denies abd pain/N/V. Tolerating clears  O; Vital Signs Last 24 Hrs  T(C): 35.8 (14 Sep 2023 14:18), Max: 37.1 (13 Sep 2023 20:45)  T(F): 96.4 (14 Sep 2023 14:18), Max: 98.7 (13 Sep 2023 20:45)  HR: 52 (14 Sep 2023 14:18) (52 - 60)  BP: 163/79 (14 Sep 2023 14:18) (138/69 - 163/79)  BP(mean): --  RR: 18 (14 Sep 2023 14:18) (18 - 18)  SpO2: --    MEDICATIONS  (STANDING):  atorvastatin 40 milliGRAM(s) Oral at bedtime  dextrose 5%. 1000 milliLiter(s) (50 mL/Hr) IV Continuous <Continuous>  dextrose 5%. 1000 milliLiter(s) (100 mL/Hr) IV Continuous <Continuous>  dextrose 50% Injectable 25 Gram(s) IV Push once  dextrose 50% Injectable 25 Gram(s) IV Push once  dextrose 50% Injectable 12.5 Gram(s) IV Push once  enoxaparin Injectable 90 milliGRAM(s) SubCutaneous every 12 hours  gabapentin 100 milliGRAM(s) Oral at bedtime  glucagon  Injectable 1 milliGRAM(s) IntraMuscular once  influenza  Vaccine (HIGH DOSE) 0.7 milliLiter(s) IntraMuscular once  insulin lispro (ADMELOG) corrective regimen sliding scale   SubCutaneous every 6 hours  lactated ringers. 1000 milliLiter(s) (150 mL/Hr) IV Continuous <Continuous>  metoprolol succinate ER 50 milliGRAM(s) Oral daily  pantoprazole    Tablet 40 milliGRAM(s) Oral before breakfast    MEDICATIONS  (PRN):  acetaminophen     Tablet .. 650 milliGRAM(s) Oral every 6 hours PRN Temp greater or equal to 38C (100.4F), Mild Pain (1 - 3)  aluminum hydroxide/magnesium hydroxide/simethicone Suspension 30 milliLiter(s) Oral every 4 hours PRN Dyspepsia  dextrose Oral Gel 15 Gram(s) Oral once PRN Blood Glucose LESS THAN 70 milliGRAM(s)/deciliter  melatonin 3 milliGRAM(s) Oral at bedtime PRN Insomnia  morphine  - Injectable 4 milliGRAM(s) IV Push every 4 hours PRN Severe Pain (7 - 10)  ondansetron Injectable 4 milliGRAM(s) IV Push every 8 hours PRN Nausea and/or Vomiting  oxycodone    5 mG/acetaminophen 325 mG 1 Tablet(s) Oral every 6 hours PRN Moderate Pain (4 - 6)    IV fluids: lactated ringers.: Solution, 1000 milliLiter(s) infuse at 150 mL/Hr    EXAM:  GEN: NAD  abd: soft, NT/ND    Labs:    POCT Blood Glucose.: 93 mg/dL (14 Sep 2023 11:44)  POCT Blood Glucose.: 90 mg/dL (14 Sep 2023 07:03)  POCT Blood Glucose.: 84 mg/dL (14 Sep 2023 00:12)  POCT Blood Glucose.: 91 mg/dL (13 Sep 2023 16:27)                          12.7   11.33 )-----------( 223      ( 14 Sep 2023 05:48 )             38.3       Auto Neutrophil %: 70.0 % (09-14-23 @ 05:48)  Auto Immature Granulocyte %: 0.4 % (09-14-23 @ 05:48)    09-14    139  |  101  |  8<L>  ----------------------------<  85  3.9   |  25  |  0.6<L>      Calcium: 10.0 mg/dL (09-14-23 @ 05:48)      LFTs:             5.9  | 0.6  | 12       ------------------[30      ( 14 Sep 2023 05:48 )  3.9  | x    | 19          Lipase:x      Amylase:x         Lactate, Blood: 1.9 mmol/L (09-13-23 @ 11:00)  Lactate, Blood: 2.7 mmol/L (09-12-23 @ 09:45)      Coags:     16.80  ----< 1.46    ( 13 Sep 2023 04:30 )     37.9            Urinalysis Basic - ( 14 Sep 2023 05:48 )    Color: x / Appearance: x / SG: x / pH: x  Gluc: 85 mg/dL / Ketone: x  / Bili: x / Urobili: x   Blood: x / Protein: x / Nitrite: x   Leuk Esterase: x / RBC: x / WBC x   Sq Epi: x / Non Sq Epi: x / Bacteria: x

## 2023-09-15 ENCOUNTER — RESULT REVIEW (OUTPATIENT)
Age: 73
End: 2023-09-15

## 2023-09-15 ENCOUNTER — TRANSCRIPTION ENCOUNTER (OUTPATIENT)
Age: 73
End: 2023-09-15

## 2023-09-15 DIAGNOSIS — K85.10 BILIARY ACUTE PANCREATITIS WITHOUT NECROSIS OR INFECTION: ICD-10-CM

## 2023-09-15 LAB
ALBUMIN SERPL ELPH-MCNC: 4.1 G/DL — SIGNIFICANT CHANGE UP (ref 3.5–5.2)
ALP SERPL-CCNC: 31 U/L — SIGNIFICANT CHANGE UP (ref 30–115)
ALT FLD-CCNC: 37 U/L — SIGNIFICANT CHANGE UP (ref 0–41)
ANION GAP SERPL CALC-SCNC: 10 MMOL/L — SIGNIFICANT CHANGE UP (ref 7–14)
APTT BLD: 33.3 SEC — SIGNIFICANT CHANGE UP (ref 27–39.2)
AST SERPL-CCNC: 38 U/L — SIGNIFICANT CHANGE UP (ref 0–41)
BASOPHILS # BLD AUTO: 0.02 K/UL — SIGNIFICANT CHANGE UP (ref 0–0.2)
BASOPHILS NFR BLD AUTO: 0.1 % — SIGNIFICANT CHANGE UP (ref 0–1)
BILIRUB DIRECT SERPL-MCNC: <0.2 MG/DL — SIGNIFICANT CHANGE UP (ref 0–0.3)
BILIRUB INDIRECT FLD-MCNC: >0.2 MG/DL — SIGNIFICANT CHANGE UP (ref 0.2–1.2)
BILIRUB SERPL-MCNC: 0.4 MG/DL — SIGNIFICANT CHANGE UP (ref 0.2–1.2)
BUN SERPL-MCNC: 11 MG/DL — SIGNIFICANT CHANGE UP (ref 10–20)
CALCIUM SERPL-MCNC: 9.8 MG/DL — SIGNIFICANT CHANGE UP (ref 8.4–10.5)
CHLORIDE SERPL-SCNC: 100 MMOL/L — SIGNIFICANT CHANGE UP (ref 98–110)
CO2 SERPL-SCNC: 26 MMOL/L — SIGNIFICANT CHANGE UP (ref 17–32)
CREAT SERPL-MCNC: 0.7 MG/DL — SIGNIFICANT CHANGE UP (ref 0.7–1.5)
EGFR: 97 ML/MIN/1.73M2 — SIGNIFICANT CHANGE UP
EOSINOPHIL # BLD AUTO: 0 K/UL — SIGNIFICANT CHANGE UP (ref 0–0.7)
EOSINOPHIL NFR BLD AUTO: 0 % — SIGNIFICANT CHANGE UP (ref 0–8)
GLUCOSE BLDC GLUCOMTR-MCNC: 108 MG/DL — HIGH (ref 70–99)
GLUCOSE BLDC GLUCOMTR-MCNC: 126 MG/DL — HIGH (ref 70–99)
GLUCOSE SERPL-MCNC: 137 MG/DL — HIGH (ref 70–99)
HCT VFR BLD CALC: 38.2 % — LOW (ref 42–52)
HGB BLD-MCNC: 13.2 G/DL — LOW (ref 14–18)
IMM GRANULOCYTES NFR BLD AUTO: 0.4 % — HIGH (ref 0.1–0.3)
INR BLD: 1.29 RATIO — SIGNIFICANT CHANGE UP (ref 0.65–1.3)
LYMPHOCYTES # BLD AUTO: 0.63 K/UL — LOW (ref 1.2–3.4)
LYMPHOCYTES # BLD AUTO: 3.7 % — LOW (ref 20.5–51.1)
MAGNESIUM SERPL-MCNC: 1.6 MG/DL — LOW (ref 1.8–2.4)
MCHC RBC-ENTMCNC: 29.3 PG — SIGNIFICANT CHANGE UP (ref 27–31)
MCHC RBC-ENTMCNC: 34.6 G/DL — SIGNIFICANT CHANGE UP (ref 32–37)
MCV RBC AUTO: 84.7 FL — SIGNIFICANT CHANGE UP (ref 80–94)
MONOCYTES # BLD AUTO: 1.32 K/UL — HIGH (ref 0.1–0.6)
MONOCYTES NFR BLD AUTO: 7.8 % — SIGNIFICANT CHANGE UP (ref 1.7–9.3)
NEUTROPHILS # BLD AUTO: 14.83 K/UL — HIGH (ref 1.4–6.5)
NEUTROPHILS NFR BLD AUTO: 88 % — HIGH (ref 42.2–75.2)
NRBC # BLD: 0 /100 WBCS — SIGNIFICANT CHANGE UP (ref 0–0)
PHOSPHATE SERPL-MCNC: 3.1 MG/DL — SIGNIFICANT CHANGE UP (ref 2.1–4.9)
PLATELET # BLD AUTO: 252 K/UL — SIGNIFICANT CHANGE UP (ref 130–400)
PMV BLD: 11.1 FL — HIGH (ref 7.4–10.4)
POTASSIUM SERPL-MCNC: 4.5 MMOL/L — SIGNIFICANT CHANGE UP (ref 3.5–5)
POTASSIUM SERPL-SCNC: 4.5 MMOL/L — SIGNIFICANT CHANGE UP (ref 3.5–5)
PROT SERPL-MCNC: 6.4 G/DL — SIGNIFICANT CHANGE UP (ref 6–8)
PROTHROM AB SERPL-ACNC: 14.8 SEC — HIGH (ref 9.95–12.87)
RBC # BLD: 4.51 M/UL — LOW (ref 4.7–6.1)
RBC # FLD: 13 % — SIGNIFICANT CHANGE UP (ref 11.5–14.5)
SODIUM SERPL-SCNC: 136 MMOL/L — SIGNIFICANT CHANGE UP (ref 135–146)
WBC # BLD: 16.86 K/UL — HIGH (ref 4.8–10.8)
WBC # FLD AUTO: 16.86 K/UL — HIGH (ref 4.8–10.8)

## 2023-09-15 PROCEDURE — 99232 SBSQ HOSP IP/OBS MODERATE 35: CPT

## 2023-09-15 PROCEDURE — 88304 TISSUE EXAM BY PATHOLOGIST: CPT | Mod: 26

## 2023-09-15 PROCEDURE — 99233 SBSQ HOSP IP/OBS HIGH 50: CPT | Mod: 57

## 2023-09-15 PROCEDURE — S2900 ROBOTIC SURGICAL SYSTEM: CPT | Mod: NC

## 2023-09-15 PROCEDURE — 47562 LAPAROSCOPIC CHOLECYSTECTOMY: CPT

## 2023-09-15 RX ORDER — PANTOPRAZOLE SODIUM 20 MG/1
40 TABLET, DELAYED RELEASE ORAL
Refills: 0 | Status: DISCONTINUED | OUTPATIENT
Start: 2023-09-15 | End: 2023-09-16

## 2023-09-15 RX ORDER — INDOCYANINE GREEN 25 MG
25 KIT INTRAVASCULAR; INTRAVENOUS ONCE
Refills: 0 | Status: DISCONTINUED | OUTPATIENT
Start: 2023-09-15 | End: 2023-09-15

## 2023-09-15 RX ORDER — SODIUM CHLORIDE 9 MG/ML
1000 INJECTION, SOLUTION INTRAVENOUS
Refills: 0 | Status: DISCONTINUED | OUTPATIENT
Start: 2023-09-15 | End: 2023-09-15

## 2023-09-15 RX ORDER — OXYCODONE AND ACETAMINOPHEN 5; 325 MG/1; MG/1
1 TABLET ORAL EVERY 6 HOURS
Refills: 0 | Status: DISCONTINUED | OUTPATIENT
Start: 2023-09-15 | End: 2023-09-16

## 2023-09-15 RX ORDER — ONDANSETRON 8 MG/1
4 TABLET, FILM COATED ORAL EVERY 8 HOURS
Refills: 0 | Status: DISCONTINUED | OUTPATIENT
Start: 2023-09-15 | End: 2023-09-16

## 2023-09-15 RX ORDER — ACETAMINOPHEN 500 MG
650 TABLET ORAL EVERY 6 HOURS
Refills: 0 | Status: DISCONTINUED | OUTPATIENT
Start: 2023-09-15 | End: 2023-09-16

## 2023-09-15 RX ORDER — MAGNESIUM OXIDE 400 MG ORAL TABLET 241.3 MG
400 TABLET ORAL
Refills: 0 | Status: DISCONTINUED | OUTPATIENT
Start: 2023-09-15 | End: 2023-09-16

## 2023-09-15 RX ORDER — ONDANSETRON 8 MG/1
4 TABLET, FILM COATED ORAL ONCE
Refills: 0 | Status: DISCONTINUED | OUTPATIENT
Start: 2023-09-15 | End: 2023-09-15

## 2023-09-15 RX ORDER — MORPHINE SULFATE 50 MG/1
4 CAPSULE, EXTENDED RELEASE ORAL EVERY 4 HOURS
Refills: 0 | Status: DISCONTINUED | OUTPATIENT
Start: 2023-09-15 | End: 2023-09-16

## 2023-09-15 RX ORDER — APIXABAN 2.5 MG/1
5 TABLET, FILM COATED ORAL EVERY 12 HOURS
Refills: 0 | Status: DISCONTINUED | OUTPATIENT
Start: 2023-09-16 | End: 2023-09-16

## 2023-09-15 RX ORDER — LANOLIN ALCOHOL/MO/W.PET/CERES
3 CREAM (GRAM) TOPICAL AT BEDTIME
Refills: 0 | Status: DISCONTINUED | OUTPATIENT
Start: 2023-09-15 | End: 2023-09-16

## 2023-09-15 RX ORDER — INSULIN LISPRO 100/ML
VIAL (ML) SUBCUTANEOUS EVERY 6 HOURS
Refills: 0 | Status: DISCONTINUED | OUTPATIENT
Start: 2023-09-15 | End: 2023-09-16

## 2023-09-15 RX ORDER — ENOXAPARIN SODIUM 100 MG/ML
90 INJECTION SUBCUTANEOUS EVERY 12 HOURS
Refills: 0 | Status: DISCONTINUED | OUTPATIENT
Start: 2023-09-15 | End: 2023-09-15

## 2023-09-15 RX ORDER — HYDROMORPHONE HYDROCHLORIDE 2 MG/ML
0.5 INJECTION INTRAMUSCULAR; INTRAVENOUS; SUBCUTANEOUS
Refills: 0 | Status: DISCONTINUED | OUTPATIENT
Start: 2023-09-15 | End: 2023-09-15

## 2023-09-15 RX ORDER — MEPERIDINE HYDROCHLORIDE 50 MG/ML
12.5 INJECTION INTRAMUSCULAR; INTRAVENOUS; SUBCUTANEOUS
Refills: 0 | Status: DISCONTINUED | OUTPATIENT
Start: 2023-09-15 | End: 2023-09-15

## 2023-09-15 RX ORDER — GABAPENTIN 400 MG/1
100 CAPSULE ORAL AT BEDTIME
Refills: 0 | Status: DISCONTINUED | OUTPATIENT
Start: 2023-09-15 | End: 2023-09-16

## 2023-09-15 RX ORDER — HYDROMORPHONE HYDROCHLORIDE 2 MG/ML
1 INJECTION INTRAMUSCULAR; INTRAVENOUS; SUBCUTANEOUS
Refills: 0 | Status: DISCONTINUED | OUTPATIENT
Start: 2023-09-15 | End: 2023-09-15

## 2023-09-15 RX ORDER — METOPROLOL TARTRATE 50 MG
50 TABLET ORAL DAILY
Refills: 0 | Status: DISCONTINUED | OUTPATIENT
Start: 2023-09-15 | End: 2023-09-16

## 2023-09-15 RX ORDER — ATORVASTATIN CALCIUM 80 MG/1
40 TABLET, FILM COATED ORAL AT BEDTIME
Refills: 0 | Status: DISCONTINUED | OUTPATIENT
Start: 2023-09-15 | End: 2023-09-16

## 2023-09-15 RX ADMIN — ENOXAPARIN SODIUM 90 MILLIGRAM(S): 100 INJECTION SUBCUTANEOUS at 18:21

## 2023-09-15 RX ADMIN — GABAPENTIN 100 MILLIGRAM(S): 400 CAPSULE ORAL at 22:03

## 2023-09-15 RX ADMIN — ATORVASTATIN CALCIUM 40 MILLIGRAM(S): 80 TABLET, FILM COATED ORAL at 22:03

## 2023-09-15 RX ADMIN — SODIUM CHLORIDE 120 MILLILITER(S): 9 INJECTION, SOLUTION INTRAVENOUS at 16:29

## 2023-09-15 NOTE — PROGRESS NOTE ADULT - SUBJECTIVE AND OBJECTIVE BOX
72yMale  Being followed for gallstone pancreatitis   Interval history: Patient denies nausea, vomiting, hematemesis, melena, blood in stool, diarrhea, constipation, abdominal pain. patient feeling 95% better tolerating soft low fat diet.      PAST MEDICAL & SURGICAL HISTORY:   High cholesterol      Elevated cholesterol with high triglycerides      Pancreatitis      Acid reflux      BP (high blood pressure)      No significant past surgical history                Social History: +cigarette smoking. No alcohol. No illegal drug use.            MEDICATIONS  (STANDING):  atorvastatin 40 milliGRAM(s) Oral at bedtime  dextrose 5%. 1000 milliLiter(s) (100 mL/Hr) IV Continuous <Continuous>  dextrose 5%. 1000 milliLiter(s) (50 mL/Hr) IV Continuous <Continuous>  dextrose 50% Injectable 25 Gram(s) IV Push once  dextrose 50% Injectable 25 Gram(s) IV Push once  dextrose 50% Injectable 12.5 Gram(s) IV Push once  gabapentin 100 milliGRAM(s) Oral at bedtime  glucagon  Injectable 1 milliGRAM(s) IntraMuscular once  indocyanine green Injectable 25 milliGRAM(s) IV Push once  influenza  Vaccine (HIGH DOSE) 0.7 milliLiter(s) IntraMuscular once  insulin lispro (ADMELOG) corrective regimen sliding scale   SubCutaneous every 6 hours  lactated ringers. 1000 milliLiter(s) (150 mL/Hr) IV Continuous <Continuous>  metoprolol succinate ER 50 milliGRAM(s) Oral daily  pantoprazole    Tablet 40 milliGRAM(s) Oral before breakfast    MEDICATIONS  (PRN):  acetaminophen     Tablet .. 650 milliGRAM(s) Oral every 6 hours PRN Temp greater or equal to 38C (100.4F), Mild Pain (1 - 3)  aluminum hydroxide/magnesium hydroxide/simethicone Suspension 30 milliLiter(s) Oral every 4 hours PRN Dyspepsia  dextrose Oral Gel 15 Gram(s) Oral once PRN Blood Glucose LESS THAN 70 milliGRAM(s)/deciliter  melatonin 3 milliGRAM(s) Oral at bedtime PRN Insomnia  morphine  - Injectable 4 milliGRAM(s) IV Push every 4 hours PRN Severe Pain (7 - 10)  ondansetron Injectable 4 milliGRAM(s) IV Push every 8 hours PRN Nausea and/or Vomiting  oxycodone    5 mG/acetaminophen 325 mG 1 Tablet(s) Oral every 6 hours PRN Moderate Pain (4 - 6)      Allergies:   No Known Allergies            REVIEW OF SYSTEMS:  General:  No weight loss, fevers, or chills.  Eyes:  No reported pain or visual changes  ENT:  No sore throat or runny nose.  NECK: No stiffness   CV:  No chest pain or palpitations.  Resp:  No shortness of breath, cough  GI:  No abdominal pain, nausea, vomiting, dysphagia, diarrhea or constipation. No rectal bleeding, melena, or hematemesis.  Muscle:  No aches or weakness  Neuro:  No tingling, numbness         VITAL SIGNS:   T(F): 96.9 (09-15-23 @ 10:52), Max: 97.8 (09-14-23 @ 20:46)  HR: 51 (09-15-23 @ 12:14) (51 - 56)  BP: 165/72 (09-15-23 @ 12:14) (154/79 - 179/85)  RR: 18 (09-15-23 @ 12:14) (18 - 18)  SpO2: 97% (09-15-23 @ 12:14) (94% - 97%)    PHYSICAL EXAM:  GENERAL: AAOx3, no acute distress.  HEAD:  Atraumatic, Normocephalic  EYES: conjunctiva and sclera clear  NECK: Supple, no JVD or thyromegaly  CHEST/LUNG: Clear to auscultation bilaterally; No wheeze, rhonchi, or rales  HEART: Regular rate and rhythm; normal S1, S2, No murmurs.  ABDOMEN: Soft, nontender, nondistended; Bowel sounds present  NEUROLOGY: No asterixis or tremor.   SKIN: Intact, no jaundice            LABS:                        12.7   11.33 )-----------( 223      ( 14 Sep 2023 05:48 )             38.3     09-14    139  |  101  |  8<L>  ----------------------------<  85  3.9   |  25  |  0.6<L>    Ca    10.0      14 Sep 2023 05:48    TPro  5.9<L>  /  Alb  3.9  /  TBili  0.6  /  DBili  x   /  AST  12  /  ALT  19  /  AlkPhos  30  09-14    LIVER FUNCTIONS - ( 14 Sep 2023 05:48 )  Alb: 3.9 g/dL / Pro: 5.9 g/dL / ALK PHOS: 30 U/L / ALT: 19 U/L / AST: 12 U/L / GGT: x           PT/INR - ( 15 Sep 2023 08:02 )   PT: 14.80 sec;   INR: 1.29 ratio         PTT - ( 15 Sep 2023 08:02 )  PTT:33.3 sec    IMAGING:    < from: US Abdomen Upper Quadrant Right (09.12.23 @ 10:48) >    ACC: [id number] EXAM:  US ABDOMEN RT UPR QUADRANT   ORDERED BY: MARIBEL COLON     PROCEDURE DATE:  09/12/2023          INTERPRETATION:  CLINICAL INFORMATION: Right upper quadrant pain    COMPARISON: Abdominal ultrasound 8/28/2023, CT abdomen and pelvis   8/20/2023    TECHNIQUE: Sonography of the right upper quadrant.    FINDINGS:  Liver: Increased echogenicity.  Bile ducts: Normal caliber. Common bile duct measures 7 mm.  Gallbladder: Cholelithiasis with gallbladder sludge visualized. Mildly   distended gallbladder wall. No sonographic Garcia sign or gallbladder   distention.  Pancreas: Pancreatic duct dilatation measuring 4.2 mm.  Right kidney: 12.4 cm. No hydronephrosis. Upper pole cyst measuring 3.4   cm.  Ascites: None.  IVC: Visualizedportions are within normal limits.    IMPRESSION:  Cholelithiasis with gallbladder sludge visualized.No sonographic Garcia   sign or gallbladder distention.    Since 8/28/2023, there is interval pancreatic duct dilatation.  Hepatic steatosis.    --- End of Report ---          SETH ZHANG MD; Resident Radiologist  This document has been electronically signed.  CHRISTIANA GARCIA MD; Attending Interventional Radiologist  This document has been electronically signed. Sep 12 2023 11:12AM    < end of copied text >

## 2023-09-15 NOTE — BRIEF OPERATIVE NOTE - OPERATION/FINDINGS
Robotic Assisted Laparoscopic Cholecystectomy, Cystic artery coagulated, Cystic Duct clipped x3 Quinolones Counseling:  I discussed with the patient the risks of fluoroquinolones including but not limited to GI upset, allergic reaction, drug rash, diarrhea, dizziness, photosensitivity, yeast infections, liver function test abnormalities, tendonitis/tendon rupture.

## 2023-09-15 NOTE — PROGRESS NOTE ADULT - ASSESSMENT
73yo Male with PMHx of DM2, HLD, Afib, recent admission 8/28 -8/31 for gallstone pancreatitis (was scheduled for lap idania but developed afib w/RVR  who presents with worsening RUQ and epigastric pain and found to have acute gallstone pancreatitis. Will need lap idania at some point, but requires medical optimization prior. Surgery consulted for lap idania planning. In the interim, will keep NPO/CLD, IVF, IV pain meds, and discuss any further testing required.     Pt seen with Dr. Marcelino yesterday     #Acute Gallstone Pancreatitis   - per cardio:  Cardiac risk surgery intermediate.. Patient in past declined Adenocard thallium. He again declined.  - pt for robotic cholecystectomy on Friday 9/15  - hold any coags   - NPO P MN

## 2023-09-15 NOTE — PROGRESS NOTE ADULT - SUBJECTIVE AND OBJECTIVE BOX
SUBJECTIVE:    Patient is a 72y old Male who presents with a chief complaint of Gallstone Pancreatitis (15 Sep 2023 09:09)    Currently admitted to medicine with the primary diagnosis of Pancreatitis       Today is hospital day 3d. This morning he is resting comfortably in bed and reports no new issues or overnight events.     ROS:   CONSTITUTIONAL: No weakness, fevers or chills   EYES/ENT: No visual changes; No vertigo or throat pain   NECK: No pain or stiffness   RESPIRATORY: No cough, wheezing, hemoptysis; No shortness of breath   CARDIOVASCULAR: No chest pain or palpitations   GASTROINTESTINAL: No abdominal or epigastric pain. No nausea, vomiting, or hematemesis; No diarrhea or constipation. No melena or hematochezia.  GENITOURINARY: No dysuria, frequency or hematuria  NEUROLOGICAL: No numbness or weakness  SKIN: No itching, rashes      PAST MEDICAL & SURGICAL HISTORY  High cholesterol    Elevated cholesterol with high triglycerides    Pancreatitis    Acid reflux    BP (high blood pressure)    No significant past surgical history      SOCIAL HISTORY:    ALLERGIES:  No Known Allergies    MEDICATIONS:  STANDING MEDICATIONS  atorvastatin 40 milliGRAM(s) Oral at bedtime  dextrose 5%. 1000 milliLiter(s) IV Continuous <Continuous>  dextrose 5%. 1000 milliLiter(s) IV Continuous <Continuous>  dextrose 50% Injectable 25 Gram(s) IV Push once  dextrose 50% Injectable 25 Gram(s) IV Push once  dextrose 50% Injectable 12.5 Gram(s) IV Push once  gabapentin 100 milliGRAM(s) Oral at bedtime  glucagon  Injectable 1 milliGRAM(s) IntraMuscular once  influenza  Vaccine (HIGH DOSE) 0.7 milliLiter(s) IntraMuscular once  insulin lispro (ADMELOG) corrective regimen sliding scale   SubCutaneous every 6 hours  lactated ringers. 1000 milliLiter(s) IV Continuous <Continuous>  metoprolol succinate ER 50 milliGRAM(s) Oral daily  pantoprazole    Tablet 40 milliGRAM(s) Oral before breakfast    PRN MEDICATIONS  acetaminophen     Tablet .. 650 milliGRAM(s) Oral every 6 hours PRN  aluminum hydroxide/magnesium hydroxide/simethicone Suspension 30 milliLiter(s) Oral every 4 hours PRN  dextrose Oral Gel 15 Gram(s) Oral once PRN  melatonin 3 milliGRAM(s) Oral at bedtime PRN  morphine  - Injectable 4 milliGRAM(s) IV Push every 4 hours PRN  ondansetron Injectable 4 milliGRAM(s) IV Push every 8 hours PRN  oxycodone    5 mG/acetaminophen 325 mG 1 Tablet(s) Oral every 6 hours PRN    VITALS:   T(F): 96.7  HR: 51  BP: 165/72  RR: 18  SpO2: 94%    LABS:  Negative for smoking/alcohol/drug use.                         12.7   11.33 )-----------( 223      ( 14 Sep 2023 05:48 )             38.3     09-14    139  |  101  |  8<L>  ----------------------------<  85  3.9   |  25  |  0.6<L>    Ca    10.0      14 Sep 2023 05:48    TPro  5.9<L>  /  Alb  3.9  /  TBili  0.6  /  DBili  x   /  AST  12  /  ALT  19  /  AlkPhos  30  09-14    PT/INR - ( 15 Sep 2023 08:02 )   PT: 14.80 sec;   INR: 1.29 ratio         PTT - ( 15 Sep 2023 08:02 )  PTT:33.3 sec  Urinalysis Basic - ( 14 Sep 2023 05:48 )    Color: x / Appearance: x / SG: x / pH: x  Gluc: 85 mg/dL / Ketone: x  / Bili: x / Urobili: x   Blood: x / Protein: x / Nitrite: x   Leuk Esterase: x / RBC: x / WBC x   Sq Epi: x / Non Sq Epi: x / Bacteria: x                RADIOLOGY:    PHYSICAL EXAM:  GEN: No acute distress  HEENT: normocephalic, atraumatic, aniceteric  LUNGS: Clear to auscultation bilaterally, no rales/wheezing/ rhonchi  HEART: S1/S2 present. RRR, no murmurs  ABD: Soft, non-tender, non-distended. Bowel sounds present  EXT: no edema  NEURO: AAOX3, normal affect      ASSESSMENT AND PLAN:    73yo Male with PMHx of DM2, HLD, Afib on eliquis at home  who presents with worsening RUQ and epigastric pain and found to have acute gallstone pancreatitis. Will need lap idania at some point, but requires medical optimization prior. Surgery consulted for lap idania planning. In the interim, will keep NPO/CLD, IVF, IV pain meds, and discuss any further testing required.     #Acute Gallstone Pancreatitis  # H/O Acute cholecystitis 8/23 sp Augmentin course   - admission 8/28-8/31 - lap idania was cancelled given new onset afib with rvr - pt prefered outpatient follow up and did not risk stratification inpatient   - no sepsis present on admission   - denies alcohol abuse /   - lipase 957--> 799   - CT AP - acute pancreatitis ; no pancreatic necrosis/ drainable collection ; no definite pancreatic head lesion   - RUQ sono - Cholelithiasis with gallbladder sludge visualized. No sonographic Garcia   sign or gallbladder distention. Since 8/28/2023, there is interval pancreatic duct dilatation.  -pain control , zofran prn for nausea   - Surgery eval appreciated- lap idania today    - cardio risk stratification appreciated - intermediate (declined stress test)  - METS > 4 , intermediate - to high risk for intermediate risk procedure   - GI following - EUS vs. MRI panc protocol in 8 weeks as an outpatient to rule out Pancreas divisum and Pancreatic neoplasm  - IVF , pain control   -AC HELD THIS AM  - diet postop per sx     # H/O Chronic Afib , rate controlled   - on Metop XL 25mg at home   - HOLD Eliquis 5mg BID (last dose 9/13 AM)   - hold lovenox for OR   - resume ac per sx postop   - chadsvasc - 3     #DM2- ISS - hold metformin and pioglitazone     #HLD - Cont atorvastatin 40mg    dvt/gi ppx-diet- NPO heather sx today   dispo: acute  handoff:  lap idania today

## 2023-09-15 NOTE — PROGRESS NOTE ADULT - NSPROGADDITIONALINFOA_GEN_ALL_CORE
I agree with the above assessment and plan after having seen and evaluated the patient.  OR today.  Risks and benefits were described and he has signed informed consent.  We will proceed with surgery.  He may be discharged safely if he is able to accomplish routine postoperative tasks.  He can follow-up with me in 2 weeks.
I have seen and examined the patient.  I agree with the above assessment plan.He is now asymptomatic and has no abdominal pain.  He has been tolerating clears.  We had a discussion about the cardiac risk ratification I reviewed cardiologist notes and assessments.  Although his risk is intermediate I think it is certainly reasonable to consider surgery at this time due to the recurrence of this problem and he has decent functional status.  I do not anticipate an involved dissection as the imaging does not specifically address cause for pancreatic ductal dilatation.  It certainly sounds like this is related to gallbladder sludge and removing the gallbladder will prevent this in the future.  I certainly agree with GI's plan for outpatient follow-up and MRI in the future to exclude slow growing malignancy. thanks. NPO at MN. periop abx. We discussed risks and benefits at length and the patient certainly wants to proceed.
I have seen and examined the patient I agree with the above assessment plan.  I will review the images with radiology for final decision making prior to surgery.  Thank you very much.

## 2023-09-15 NOTE — PROGRESS NOTE ADULT - NS ATTEND AMEND GEN_ALL_CORE FT
Pt feeling better, planned for OR today for lap idania. Pt will require follow up for EUS vs MRCP to evaluate pancreas.
Patient with gallstone pancreatitis improving . Surgical consult for cholecystectomy.
I edited the note

## 2023-09-15 NOTE — PROGRESS NOTE ADULT - SUBJECTIVE AND OBJECTIVE BOX
71yo Male with PMHx of DM2, HLD, Afib who presents with worsening RUQ and epigastric pain 71yo Male with PMHx of DM2, HLD, Afib who presents with worsening RUQ and epigastric pain  with Acute cholecystitis   s/p   ·  PROCEDURES:  Robot-assisted cholecystectomy   seen and  examined  in bed  awake/ alert  no complaints  v/s  T 97, /70, HR 58, RR 18  PE AXOX3  PULM CTA  CVS A FIB  ABD  SOFT  DRESSINGS CDI    + VOID  A&P  Acute cholecystitis   s/p   ·  PROCEDURES:  Robot-assisted cholecystectomy   ON CLEAR LIQUID  DIET  PAIN MGMT  DVT  GI PROPHYLAXIS  A FIB ON ELIQUIS  TO START IN AM  WILL FOLLOW   73yo Male with PMHx of DM2, HLD, Afib who presents with worsening RUQ and epigastric pain  with Acute cholecystitis   s/p   ·  PROCEDURES:  Robot-assisted cholecystectomy   seen and  examined  in bed  awake/ alert  no complaints  v/s  T 97, /70, HR 58, RR 18  PE AXOX3  PULM CTA  CVS A FIB  ABD  SOFT  DRESSINGS CDI    + VOID                        13.2   16.86 )-----------( 252      ( 15 Sep 2023 21:30 )             38.2   09-15    136  |  100  |  11  ----------------------------<  137<H>  4.5   |  26  |  0.7    Ca    9.8      15 Sep 2023 21:30  Phos  3.1     09-15  Mg     1.6     09-15    TPro  6.4  /  Alb  4.1  /  TBili  0.4  /  DBili  <0.2  /  AST  38  /  ALT  37  /  AlkPhos  31  09-15    A&P  Acute cholecystitis   s/p   ·  PROCEDURES:  Robot-assisted cholecystectomy   ON CLEAR LIQUID  DIET  PAIN MGMT  DVT  GI PROPHYLAXIS  A FIB ON ELIQUIS  TO START IN AM  WILL FOLLOW

## 2023-09-15 NOTE — PROGRESS NOTE ADULT - SUBJECTIVE AND OBJECTIVE BOX
72y old Male who presents with a chief complaint of Gallstone Pancreatitis -likely due to GB stones   pt resting comfortable ,   denies cp / sob / nvd / ha   + min abd pain/ epigastric pain     no new issues or overnight events.       PAST MEDICAL & SURGICAL HISTORY  High cholesterol  Elevated cholesterol with high triglycerides  Pancreatitis  Acid reflux  BP (high blood pressure)      ALLERGIES:  No Known Allergies    MEDICATIONS:  STANDING MEDICATIONS  atorvastatin 40 milliGRAM(s) Oral at bedtime  dextrose 5%. 1000 milliLiter(s) IV Continuous <Continuous>  dextrose 5%. 1000 milliLiter(s) IV Continuous <Continuous>  dextrose 50% Injectable 25 Gram(s) IV Push once  dextrose 50% Injectable 25 Gram(s) IV Push once  dextrose 50% Injectable 12.5 Gram(s) IV Push once  enoxaparin Injectable 90 milliGRAM(s) SubCutaneous every 12 hours  gabapentin 100 milliGRAM(s) Oral at bedtime  glucagon  Injectable 1 milliGRAM(s) IntraMuscular once  influenza  Vaccine (HIGH DOSE) 0.7 milliLiter(s) IntraMuscular once  insulin lispro (ADMELOG) corrective regimen sliding scale   SubCutaneous every 6 hours  lactated ringers. 1000 milliLiter(s) IV Continuous <Continuous>  metoprolol succinate ER 50 milliGRAM(s) Oral daily  pantoprazole    Tablet 40 milliGRAM(s) Oral before breakfast    PRN MEDICATIONS  acetaminophen     Tablet .. 650 milliGRAM(s) Oral every 6 hours PRN  aluminum hydroxide/magnesium hydroxide/simethicone Suspension 30 milliLiter(s) Oral every 4 hours PRN  dextrose Oral Gel 15 Gram(s) Oral once PRN  melatonin 3 milliGRAM(s) Oral at bedtime PRN  morphine  - Injectable 4 milliGRAM(s) IV Push every 4 hours PRN  ondansetron Injectable 4 milliGRAM(s) IV Push every 8 hours PRN  oxycodone    5 mG/acetaminophen 325 mG 1 Tablet(s) Oral every 6 hours PRN    Vital Signs Last 24 Hrs  T(C): 35.9 (15 Sep 2023 05:00), Max: 36.6 (14 Sep 2023 20:46)  T(F): 96.7 (15 Sep 2023 05:00), Max: 97.8 (14 Sep 2023 20:46)  HR: 56 (15 Sep 2023 05:00) (52 - 56)  BP: 154/79 (15 Sep 2023 05:00) (154/79 - 179/85)  BP(mean): --  RR: 18 (15 Sep 2023 05:00) (18 - 18)  SpO2: 94% (14 Sep 2023 20:46) (94% - 94%)      PHYSICAL EXAM:  GEN: No acute distress  HEENT: normocephalic, atraumatic, aniceteric  LUNGS: bl breath sounds   HEART: S1/S2 present. RRR, no murmurs  ABD: Soft, + ttp in upper quads , non-distended. Bowel sounds present  EXT: warm   NEURO: AAOX3, normal affect      09-14    139  |  101  |  8<L>  ----------------------------<  85  3.9   |  25  |  0.6<L>    Ca    10.0      14 Sep 2023 05:48    TPro  5.9<L>  /  Alb  3.9  /  TBili  0.6  /  DBili  x   /  AST  12  /  ALT  19  /  AlkPhos  30  09-14                            12.7   11.33 )-----------( 223      ( 14 Sep 2023 05:48 )             38.3     CAPILLARY BLOOD GLUCOSE      POCT Blood Glucose.: 108 mg/dL (15 Sep 2023 07:21)  POCT Blood Glucose.: 109 mg/dL (14 Sep 2023 21:15)  POCT Blood Glucose.: 84 mg/dL (14 Sep 2023 16:09)  POCT Blood Glucose.: 93 mg/dL (14 Sep 2023 11:44)

## 2023-09-16 ENCOUNTER — TRANSCRIPTION ENCOUNTER (OUTPATIENT)
Age: 73
End: 2023-09-16

## 2023-09-16 VITALS
SYSTOLIC BLOOD PRESSURE: 152 MMHG | DIASTOLIC BLOOD PRESSURE: 75 MMHG | HEART RATE: 62 BPM | TEMPERATURE: 99 F | RESPIRATION RATE: 18 BRPM

## 2023-09-16 LAB
ALBUMIN SERPL ELPH-MCNC: 3.9 G/DL — SIGNIFICANT CHANGE UP (ref 3.5–5.2)
ALP SERPL-CCNC: 29 U/L — LOW (ref 30–115)
ALT FLD-CCNC: 45 U/L — HIGH (ref 0–41)
ANION GAP SERPL CALC-SCNC: 12 MMOL/L — SIGNIFICANT CHANGE UP (ref 7–14)
AST SERPL-CCNC: 50 U/L — HIGH (ref 0–41)
BASOPHILS # BLD AUTO: 0.01 K/UL — SIGNIFICANT CHANGE UP (ref 0–0.2)
BASOPHILS NFR BLD AUTO: 0.1 % — SIGNIFICANT CHANGE UP (ref 0–1)
BILIRUB SERPL-MCNC: 0.5 MG/DL — SIGNIFICANT CHANGE UP (ref 0.2–1.2)
BUN SERPL-MCNC: 13 MG/DL — SIGNIFICANT CHANGE UP (ref 10–20)
CALCIUM SERPL-MCNC: 9.6 MG/DL — SIGNIFICANT CHANGE UP (ref 8.4–10.5)
CHLORIDE SERPL-SCNC: 99 MMOL/L — SIGNIFICANT CHANGE UP (ref 98–110)
CO2 SERPL-SCNC: 26 MMOL/L — SIGNIFICANT CHANGE UP (ref 17–32)
CREAT SERPL-MCNC: 0.7 MG/DL — SIGNIFICANT CHANGE UP (ref 0.7–1.5)
EGFR: 97 ML/MIN/1.73M2 — SIGNIFICANT CHANGE UP
EOSINOPHIL # BLD AUTO: 0 K/UL — SIGNIFICANT CHANGE UP (ref 0–0.7)
EOSINOPHIL NFR BLD AUTO: 0 % — SIGNIFICANT CHANGE UP (ref 0–8)
GLUCOSE BLDC GLUCOMTR-MCNC: 107 MG/DL — HIGH (ref 70–99)
GLUCOSE BLDC GLUCOMTR-MCNC: 88 MG/DL — SIGNIFICANT CHANGE UP (ref 70–99)
GLUCOSE SERPL-MCNC: 94 MG/DL — SIGNIFICANT CHANGE UP (ref 70–99)
HCT VFR BLD CALC: 36 % — LOW (ref 42–52)
HGB BLD-MCNC: 12.1 G/DL — LOW (ref 14–18)
IMM GRANULOCYTES NFR BLD AUTO: 0.5 % — HIGH (ref 0.1–0.3)
LYMPHOCYTES # BLD AUTO: 1.75 K/UL — SIGNIFICANT CHANGE UP (ref 1.2–3.4)
LYMPHOCYTES # BLD AUTO: 13.5 % — LOW (ref 20.5–51.1)
MCHC RBC-ENTMCNC: 28.7 PG — SIGNIFICANT CHANGE UP (ref 27–31)
MCHC RBC-ENTMCNC: 33.6 G/DL — SIGNIFICANT CHANGE UP (ref 32–37)
MCV RBC AUTO: 85.5 FL — SIGNIFICANT CHANGE UP (ref 80–94)
MONOCYTES # BLD AUTO: 1.29 K/UL — HIGH (ref 0.1–0.6)
MONOCYTES NFR BLD AUTO: 10 % — HIGH (ref 1.7–9.3)
NEUTROPHILS # BLD AUTO: 9.84 K/UL — HIGH (ref 1.4–6.5)
NEUTROPHILS NFR BLD AUTO: 75.9 % — HIGH (ref 42.2–75.2)
NRBC # BLD: 0 /100 WBCS — SIGNIFICANT CHANGE UP (ref 0–0)
PLATELET # BLD AUTO: 255 K/UL — SIGNIFICANT CHANGE UP (ref 130–400)
PMV BLD: 11.9 FL — HIGH (ref 7.4–10.4)
POTASSIUM SERPL-MCNC: 4.4 MMOL/L — SIGNIFICANT CHANGE UP (ref 3.5–5)
POTASSIUM SERPL-SCNC: 4.4 MMOL/L — SIGNIFICANT CHANGE UP (ref 3.5–5)
PROT SERPL-MCNC: 5.9 G/DL — LOW (ref 6–8)
RBC # BLD: 4.21 M/UL — LOW (ref 4.7–6.1)
RBC # FLD: 12.8 % — SIGNIFICANT CHANGE UP (ref 11.5–14.5)
SODIUM SERPL-SCNC: 137 MMOL/L — SIGNIFICANT CHANGE UP (ref 135–146)
WBC # BLD: 12.95 K/UL — HIGH (ref 4.8–10.8)
WBC # FLD AUTO: 12.95 K/UL — HIGH (ref 4.8–10.8)

## 2023-09-16 PROCEDURE — 99239 HOSP IP/OBS DSCHRG MGMT >30: CPT

## 2023-09-16 RX ORDER — ACETAMINOPHEN 500 MG
2 TABLET ORAL
Qty: 0 | Refills: 0 | DISCHARGE
Start: 2023-09-16

## 2023-09-16 RX ORDER — OXYCODONE HYDROCHLORIDE 5 MG/1
1 TABLET ORAL
Qty: 6 | Refills: 0
Start: 2023-09-16 | End: 2023-09-17

## 2023-09-16 RX ORDER — GABAPENTIN 400 MG/1
1 CAPSULE ORAL
Qty: 0 | Refills: 0 | DISCHARGE

## 2023-09-16 RX ORDER — OXYCODONE HYDROCHLORIDE 5 MG/1
1 TABLET ORAL
Qty: 8 | Refills: 0
Start: 2023-09-16

## 2023-09-16 RX ADMIN — APIXABAN 5 MILLIGRAM(S): 2.5 TABLET, FILM COATED ORAL at 05:29

## 2023-09-16 RX ADMIN — Medication 50 MILLIGRAM(S): at 05:29

## 2023-09-16 RX ADMIN — PANTOPRAZOLE SODIUM 40 MILLIGRAM(S): 20 TABLET, DELAYED RELEASE ORAL at 05:29

## 2023-09-16 RX ADMIN — MAGNESIUM OXIDE 400 MG ORAL TABLET 400 MILLIGRAM(S): 241.3 TABLET ORAL at 09:00

## 2023-09-16 NOTE — PROGRESS NOTE ADULT - REASON FOR ADMISSION
Gallstone Pancreatitis

## 2023-09-16 NOTE — DISCHARGE NOTE NURSING/CASE MANAGEMENT/SOCIAL WORK - PATIENT PORTAL LINK FT
You can access the FollowMyHealth Patient Portal offered by [facility] by registering at the following website: [email or web address]. By joining [employer]’s FollowMyHealth portal, you will also be able to view your health information using other applications (apps) compatible with our system.

## 2023-09-16 NOTE — DISCHARGE NOTE PROVIDER - DISCHARGE SERVICE FOR PATIENT
on the discharge service for the patient. I have reviewed and made amendments to the documentation where necessary.
Attending Only

## 2023-09-16 NOTE — DISCHARGE NOTE PROVIDER - CARE PROVIDER_API CALL
Will Marcelino  Surgery  [address] [place] [address]  Phone: [phone]  Fax: [phone]  Follow Up Time: 1 week

## 2023-09-16 NOTE — PROGRESS NOTE ADULT - SUBJECTIVE AND OBJECTIVE BOX
.  s/p Robotic Assisted Laparoscopic Cholecystectomy --> POD #1      Patient seen & examined.  No acute events noted overnight.  Remains afebrile.   Patient reports feels well, mild incisional tenderness with no need for pain medication.  Patient states wants to be discharged home today.    Patient tolerates clear liquids well.  + Flatus, No BM.  Patient denies subjective fever, chills, tremors, N/V/D, CP or SOB.         MEDICATIONS  (STANDING):  apixaban 5 milliGRAM(s) Oral every 12 hours  atorvastatin 40 milliGRAM(s) Oral at bedtime  gabapentin 100 milliGRAM(s) Oral at bedtime  influenza  Vaccine (HIGH DOSE) 0.7 milliLiter(s) IntraMuscular once  insulin lispro (ADMELOG) corrective regimen sliding scale   SubCutaneous every 6 hours  magnesium oxide 400 milliGRAM(s) Oral three times a day with meals  metoprolol succinate ER 50 milliGRAM(s) Oral daily  pantoprazole    Tablet 40 milliGRAM(s) Oral before breakfast    MEDICATIONS  (PRN):  acetaminophen     Tablet .. 650 milliGRAM(s) Oral every 6 hours PRN Temp greater or equal to 38C (100.4F), Mild Pain (1 - 3)  aluminum hydroxide/magnesium hydroxide/simethicone Suspension 30 milliLiter(s) Oral every 4 hours PRN Dyspepsia  melatonin 3 milliGRAM(s) Oral at bedtime PRN Insomnia  morphine  - Injectable 4 milliGRAM(s) IV Push every 4 hours PRN Severe Pain (7 - 10)  ondansetron Injectable 4 milliGRAM(s) IV Push every 8 hours PRN Nausea and/or Vomiting  oxycodone    5 mG/acetaminophen 325 mG 1 Tablet(s) Oral every 6 hours PRN Moderate Pain (4 - 6)        Vital Signs Last 24 Hrs  T(C): 37.4 (16 Sep 2023 05:00), Max: 37.4 (16 Sep 2023 05:00)  T(F): 99.4 (16 Sep 2023 05:00), Max: 99.4 (16 Sep 2023 05:00)  HR: 62 (16 Sep 2023 05:00) (51 - 84)  BP: 152/75 (16 Sep 2023 05:00) (135/64 - 176/91)  RR: 18 (16 Sep 2023 05:00) (12 - 23)  SpO2: 96% (15 Sep 2023 17:03) (93% - 98%)    Parameters below as of 15 Sep 2023 17:03  Patient On (Oxygen Delivery Method): room air          Physical Exam:  General:  WD, WN, male conversant in NAD.   Neck:  Supple, No JVD.  Chest:  Clear to auscultation bilaterally, Equal expansion bilaterally, equal breath sounds, No W/R/R.  CV:  S1 & S2, RRR, No M/R/G.   Abdomen:  + Bowel sounds, soft, No distention.  5 Band aid dressings intact.  No discharge, bleeding or erythema.  mild incisional tenderness with palpation.  No rebound/guarding or peritoneal signs.    Extremities:  No C/C/E,  No calf tenderness B/L.    Neuro: AxAxOx4, no focal deficits.         LABS:                        12.1   12.95 )-----------( 255      ( 16 Sep 2023 07:17 )             36.0     09-15    136  |  100  |  11  ----------------------------<  137<H>  4.5   |  26  |  0.7    Ca    9.8      15 Sep 2023 21:30  Phos  3.1     09-15  Mg     1.6     09-15    TPro  6.4  /  Alb  4.1  /  TBili  0.4  /  DBili  <0.2  /  AST  38  /  ALT  37  /  AlkPhos  31  09-15      PT/INR - ( 15 Sep 2023 08:02 )   PT: 14.80 sec;   INR: 1.29 ratio    PTT - ( 15 Sep 2023 08:02 )  PTT:33.3 sec      Urinalysis Basic - ( 15 Sep 2023 21:30 )    Color: x / Appearance: x / SG: x / pH: x  Gluc: 137 mg/dL / Ketone: x  / Bili: x / Urobili: x   Blood: x / Protein: x / Nitrite: x   Leuk Esterase: x / RBC: x / WBC x   Sq Epi: x / Non Sq Epi: x / Bacteria: x        .

## 2023-09-16 NOTE — PROGRESS NOTE ADULT - PROVIDER SPECIALTY LIST ADULT
Gastroenterology
Surgery
Surgery
Gastroenterology
Hospitalist
Gastroenterology
Surgery

## 2023-09-16 NOTE — DISCHARGE NOTE PROVIDER - HOSPITAL COURSE
HPI:  73yo Male with PMHx of DM2, HLD, Afib who presents with worsening RUQ and epigastric pain. Pt originally admitted to [facility] from 8/28-/31 for acute cholecystitis. Surgery was consulted and was planning on a Lap idania, however, course was complicated by new Afib with RVR which caused his surgery to get delayed for medical optimization. Patient declined to stay for further optimization prior to surgery and after shared-decision making, opted to pursue outpatient follow up with his outpatient providers. He reports feeling well up until day prior to presentation when pain in the RUQ, epigastrium returned. Describes the pain as a 9/10, "tightness" radiating to his back associated with loss of appetite and dry heaves. Denies fever, chills, chest pain, SOB, nausea, vomiting, diarrhea, constipation.     In the ED, afebrile, hypertensive to SBP 170s, otherwise hemodynamically stable. Labs were notable for WBC 16, Lipase 957, Lactate 2.7. RUQ US with cholelithiasis and gallbladder sludge with interval development of pancreatic duct dilation. CT A/P showed acute pancreatitis with no necrosis or fluid collection. Admitted to medicine for further management.  (12 Sep 2023 16:42).    Patient evaluated by Dr. Marcelino and recommendations of Robotic Lap idania discussed with patient once Afib and RVR improved and medically optimized and Cardiology consult recommendations followed.     9/13:  Cardiology consulted and recommended the following:  Plan:  - Pt currently hd stable back in NSR, denies chest pain and SOB  - Pt again offered but refused inpatient pharmacological thallium stress test at this time. Pt is aware of risks  - functional capacity > 4METS  - cardiac risk for lap idania is intermediate  - c/w BB/Statin  - CHADS-VASC: 3 - Would continue on Eliquis for TE ppx, hold 36hr prior to procedure, resume post-op when stable  - counselled regarding smoking cessation  - f/u with his cardiologist.    Infectious disease consulted and Gastroenterology consulted.       9/15/2023:  Patient agreed and underwent Robotic Assisted Laparoscopic Cholecystectomy by Dr. Marcelino.      Patient tolerated procedure well and had 2 doses of post operative antibiotics per protocol.  Patient tolerated diet well without increased pain, N/V, fever, chills, tremors, chest pain or shortness of breath.    Patient remained afebrile and stable post-operatively and was deemed stable for discharge home on 9/16/2023.    Discharged home on 9/16/2023 with Rx of Percocet for pain.          HPI:  71yo Male with PMHx of DM2, HLD, Afib who presents with worsening RUQ and epigastric pain. Pt originally admitted to [facility] from 8/28-/31 for acute cholecystitis. Surgery was consulted and was planning on a Lap idania, however, course was complicated by new Afib with RVR which caused his surgery to get delayed for medical optimization. Patient declined to stay for further optimization prior to surgery and after shared-decision making, opted to pursue outpatient follow up with his outpatient providers. He reports feeling well up until day prior to presentation when pain in the RUQ, epigastrium returned. Describes the pain as a 9/10, "tightness" radiating to his back associated with loss of appetite and dry heaves. Denies fever, chills, chest pain, SOB, nausea, vomiting, diarrhea, constipation.     In the ED, afebrile, hypertensive to SBP 170s, otherwise hemodynamically stable. Labs were notable for WBC 16, Lipase 957, Lactate 2.7. RUQ US with cholelithiasis and gallbladder sludge with interval development of pancreatic duct dilation. CT A/P showed acute pancreatitis with no necrosis or fluid collection. Admitted to medicine for further management.  (12 Sep 2023 16:42).    Patient evaluated by Dr. Marcelino and recommendations of Robotic Lap idania discussed with patient once Afib and RVR improved and medically optimized and Cardiology consult recommendations followed.     9/13:  Cardiology consulted and recommended the following:  Plan:  - Pt currently hd stable back in NSR, denies chest pain and SOB  - Pt again offered but refused inpatient pharmacological thallium stress test at this time. Pt is aware of risks  - functional capacity > 4METS  - cardiac risk for lap idania is intermediate  - c/w BB/Statin  - CHADS-VASC: 3 - Would continue on Eliquis for TE ppx, hold 36hr prior to procedure, resume post-op when stable  - counselled regarding smoking cessation  - f/u with his cardiologist.    Infectious disease consulted and Gastroenterology consulted.       9/15/2023:  Patient agreed and underwent Robotic Assisted Laparoscopic Cholecystectomy by Dr. Marcelino.      Patient tolerated procedure well and had 2 doses of post operative antibiotics per protocol.  Patient tolerated diet well without increased pain, N/V, fever, chills, tremors, chest pain or shortness of breath.    Patient remained afebrile and stable post-operatively and was deemed stable for discharge home on 9/16/2023.    Discharged home on 9/16/2023 with Rx of Percocet for pain.     attending attestation:  patient seen and examined on day of discharge  labs and vitals reviewed  patient has no complaints  plan of care discussed with patient/family in agreement and understanding

## 2023-09-16 NOTE — DISCHARGE NOTE PROVIDER - NSDCCPCAREPLAN_GEN_ALL_CORE_FT
PRINCIPAL DISCHARGE DIAGNOSIS  Diagnosis: Gallstone pancreatitis  Assessment and Plan of Treatment: - s/p Robotically Assisted Laparoscopic Cholecystectomy on 9/15/2023.  - Take Tylenol and Oxycodone as needed for pain.  - Continue a Low fat diet.  - May shower with band aids in place and can replace after shower.    - No lifting anything > 10 lbs or strenuous activity until cleared by Dr. Marcelino.  - Remove the band aids and leave steri strips in place on Tuesday 9/19/2023 and still may shower if incisions are not leaking or oozing.   - Call Dr. Marcelino's office on Monday or Tuesday to arrange a Post-operative appointment for 1 week.      SECONDARY DISCHARGE DIAGNOSES  Diagnosis: Nausea & vomiting  Assessment and Plan of Treatment:      PRINCIPAL DISCHARGE DIAGNOSIS  Diagnosis: Gallstone pancreatitis  Assessment and Plan of Treatment: - s/p Robotically Assisted Laparoscopic Cholecystectomy on 9/15/2023.  - Take Tylenol and Oxycodone as needed for pain.  - Continue a Low fat diet.  - May shower with band aids in place and can replace after shower.    - No lifting anything > 10 lbs or strenuous activity until cleared by Dr. Marcelino.  - Remove the band aids and leave steri strips in place on Tuesday 9/19/2023 and still may shower if incisions are not leaking or oozing.   - Call Dr. Marcelino's office on Monday or Tuesday to arrange a Post-operative appointment for 1 week.      SECONDARY DISCHARGE DIAGNOSES  Diagnosis: Chronic atrial fibrillation  Assessment and Plan of Treatment: eliquis resumed

## 2023-09-16 NOTE — DISCHARGE NOTE PROVIDER - ATTENDING DISCHARGE PHYSICAL EXAMINATION:
PHYSICAL EXAM:  GENERAL: NAD, lying in bed comfortably  HEAD:  Atraumatic, Normocephalic  EYES: EOMI, PERRLA, conjunctiva and sclera clear  ENT: Moist mucous membranes  NECK: Supple, No JVD  CHEST/LUNG: Clear to auscultation bilaterally; No rales, rhonchi, wheezing, or rubs. Unlabored respirations  HEART: Regular rate and rhythm; No murmurs, rubs, or gallops  ABDOMEN: Bowel sounds present; Soft, Nontender, Nondistended. No hepatomegally  EXTREMITIES:  2+ Peripheral Pulses, brisk capillary refill. No clubbing, cyanosis, or edema

## 2023-09-16 NOTE — DISCHARGE NOTE NURSING/CASE MANAGEMENT/SOCIAL WORK - NSDCPEFALRISK_GEN_ALL_CORE
For information on Fall & Injury Prevention, visit: [email or web address] OR  [email or web address] OR  https://www.cdc.gov/steadi/patient.html

## 2023-09-16 NOTE — DISCHARGE NOTE PROVIDER - NSDCFUADDINST_GEN_ALL_CORE_FT
- s/p Robotically Assisted Laparoscopic Cholecystectomy on 9/15/2023. - Take Tylenol and Oxycodone as needed for pain. - Continue a Low fat diet. - May shower with band aids in place and can replace after shower.   - No lifting anything > 10 lbs or strenuous activity until cleared by Dr. Marcelino. - Remove the band aids and leave steri strips in place on Tuesday 9/19/2023 and still may shower if incisions are not leaking or oozing.  - Call Dr. Marcelino's office on Monday or Tuesday to arrange a Post-operative appointment for 1 week.   * Return to Emergency room if develop any persistent fever > 101, chills, tremors, persistent nausea/vomiting, severe pain not relieved by pain medication, inability to urinate, chest pains, difficulty breathing or any bleeding.

## 2023-09-16 NOTE — PROGRESS NOTE ADULT - ASSESSMENT
.  Impression:    s/p Robotic Assisted Laparoscopic Cholecystectomy --> POD #1        Plan:    - Continue Clears and advance to a Low fat diet as tolerated.  - Ivabx:  Completed 2 doses post procedure per protocol.  No discharge Abx needed.    - VTE prophylaxis:  Resumed Apixaban 5 mg PO every 12 hours for his Afib.  - GI prophylaxis: Protonix / Zofran PRN..  - Pain medications:  Tylenol PRN mild pain,  Oxycodone 5 mg PRN moderate pain and Morphine 2 mg IVP PRN severe pain.   - Continue home medications for co-morbidities.   - Encourage incentive spirometer 10 times every hour.  - Encourage OOB to chair and ambulation.    - Case d/w surgery resident and will discuss with Dr. Bhatt (covering for Dr. Marcelino).  - Discharge patient home today and follow up with Dr. Marcelino in 1 week.  No need for further Abx.,  Can give a few tabs of Oxycodone.       .  Impression:    s/p Robotic Assisted Laparoscopic Cholecystectomy --> POD #1        Plan:    - Continue Clears and advance to a Low fat diet as tolerated.  - Ivabx:  Completed 2 doses post procedure per protocol.  No discharge Abx needed.    - VTE prophylaxis:  Resumed Apixaban 5 mg PO every 12 hours for his Afib.  - GI prophylaxis: Protonix / Zofran PRN..  - Pain medications:  Tylenol PRN mild pain,  Oxycodone 5 mg PRN moderate pain and Morphine 2 mg IVP PRN severe pain.   - Continue home medications for co-morbidities.   - Encourage incentive spirometer 10 times every hour.  - Encourage OOB to chair and ambulation.    - Case d/w surgery resident and will discuss with Dr. Pritchard (covering for Dr. Marcelino).  - Discharge patient home today and follow up with Dr. Marcelino in 1 week.  No need for further Abx.,  Can give a few tabs of Oxycodone.

## 2023-09-16 NOTE — DISCHARGE NOTE PROVIDER - NSDCMRMEDTOKEN_GEN_ALL_CORE_FT
acetaminophen 325 mg oral tablet: 2 tab(s) orally every 6 hours As needed Temp greater or equal to 38C (100.4F), Mild Pain (1 - 3)  aspirin 81 mg oral delayed release capsule: orally  atorvastatin 40 mg oral tablet: 1 orally once a day (at bedtime)  Eliquis 5 mg oral tablet: 1 tab(s) orally 2 times a day  gabapentin 100 mg oral capsule: 1 capsule orally once a day  MetFORMIN (Eqv-Glumetza) 1000 mg oral tablet, extended release: 1 orally 2 times a day  omeprazole 40 mg oral delayed release capsule: 1 orally once a day  oxyCODONE 5 mg oral tablet: 1 tab(s) orally every 6 hours as needed for  severe pain MDD: 4  pioglitazone 30 mg oral tablet: 1 orally  Toprol-XL 25 mg oral tablet, extended release: 1 tab(s) orally once a day   acetaminophen 325 mg oral tablet: 2 tab(s) orally every 6 hours As needed Temp greater or equal to 38C (100.4F), Mild Pain (1 - 3)  aspirin 81 mg oral delayed release capsule: orally  atorvastatin 40 mg oral tablet: 1 orally once a day (at bedtime)  Eliquis 5 mg oral tablet: 1 tab(s) orally 2 times a day  gabapentin 100 mg oral capsule: 1 capsule orally once a day  MetFORMIN (Eqv-Glumetza) 1000 mg oral tablet, extended release: 1 orally 2 times a day  omeprazole 40 mg oral delayed release capsule: 1 orally once a day  oxyCODONE 5 mg oral tablet: 1 tab(s) orally every 8 hours as needed for  severe pain MDD 3 TABS MDD: 4  pioglitazone 30 mg oral tablet: 1 orally  Toprol-XL 25 mg oral tablet, extended release: 1 tab(s) orally once a day

## 2023-09-19 DIAGNOSIS — K91.81 OTHER INTRAOPERATIVE COMPLICATIONS OF DIGESTIVE SYSTEM: ICD-10-CM

## 2023-09-19 DIAGNOSIS — K83.8 OTHER SPECIFIED DISEASES OF BILIARY TRACT: ICD-10-CM

## 2023-09-19 DIAGNOSIS — I48.0 PAROXYSMAL ATRIAL FIBRILLATION: ICD-10-CM

## 2023-09-19 DIAGNOSIS — Z79.01 LONG TERM (CURRENT) USE OF ANTICOAGULANTS: ICD-10-CM

## 2023-09-19 DIAGNOSIS — Z79.82 LONG TERM (CURRENT) USE OF ASPIRIN: ICD-10-CM

## 2023-09-19 DIAGNOSIS — K21.9 GASTRO-ESOPHAGEAL REFLUX DISEASE WITHOUT ESOPHAGITIS: ICD-10-CM

## 2023-09-19 DIAGNOSIS — E11.40 TYPE 2 DIABETES MELLITUS WITH DIABETIC NEUROPATHY, UNSPECIFIED: ICD-10-CM

## 2023-09-19 DIAGNOSIS — E78.1 PURE HYPERGLYCERIDEMIA: ICD-10-CM

## 2023-09-19 DIAGNOSIS — F17.210 NICOTINE DEPENDENCE, CIGARETTES, UNCOMPLICATED: ICD-10-CM

## 2023-09-19 DIAGNOSIS — Z79.84 LONG TERM (CURRENT) USE OF ORAL HYPOGLYCEMIC DRUGS: ICD-10-CM

## 2023-09-19 DIAGNOSIS — E78.5 HYPERLIPIDEMIA, UNSPECIFIED: ICD-10-CM

## 2023-09-19 DIAGNOSIS — K85.10 BILIARY ACUTE PANCREATITIS WITHOUT NECROSIS OR INFECTION: ICD-10-CM

## 2023-09-19 DIAGNOSIS — K76.0 FATTY (CHANGE OF) LIVER, NOT ELSEWHERE CLASSIFIED: ICD-10-CM

## 2023-09-19 PROBLEM — Z00.00 ENCOUNTER FOR PREVENTIVE HEALTH EXAMINATION: Status: ACTIVE | Noted: 2023-09-19

## 2023-09-29 ENCOUNTER — APPOINTMENT (OUTPATIENT)
Dept: SURGERY | Facility: CLINIC | Age: 73
End: 2023-09-29
Payer: MEDICARE

## 2023-09-29 VITALS
BODY MASS INDEX: 25.73 KG/M2 | HEART RATE: 62 BPM | SYSTOLIC BLOOD PRESSURE: 124 MMHG | HEIGHT: 72 IN | WEIGHT: 190 LBS | OXYGEN SATURATION: 96 % | DIASTOLIC BLOOD PRESSURE: 68 MMHG

## 2023-09-29 DIAGNOSIS — Z90.49 ACQUIRED ABSENCE OF OTHER SPECIFIED PARTS OF DIGESTIVE TRACT: ICD-10-CM

## 2023-09-29 PROCEDURE — 99024 POSTOP FOLLOW-UP VISIT: CPT

## 2023-10-31 NOTE — PROGRESS NOTE ADULT - SUBJECTIVE AND OBJECTIVE BOX
GENERAL SURGERY PROGRESS NOTE    Patient: ARIELLE KERR , 72y ([birth year])Male   MRN: [id number]  Location: [address] 1  Visit: 08-28-23 - 08-31-23 Inpatient  Date: 08-31-23 @ 15:54      Procedure/Dx/Injuries: r/o acute idania    Events of past 24 hours: afib with rvr, converted, surgery cancelled    PAST MEDICAL & SURGICAL HISTORY:  High cholesterol      Elevated cholesterol with high triglycerides      Pancreatitis      Acid reflux      BP (high blood pressure)      No significant past surgical history          Vitals:   T(F): 100 (08-31-23 @ 13:36), Max: 100 (08-31-23 @ 13:36)  HR: 64 (08-31-23 @ 13:36)  BP: 166/79 (08-31-23 @ 13:36)  RR: 18 (08-31-23 @ 13:36)  SpO2: 95% (08-31-23 @ 13:36)      Diet, Consistent Carbohydrate w/Evening Snack:   DASH/TLC Sodium & Cholesterol Restricted  Low Fat (LOWFAT)      Fluids:     I & O's:    08-30-23 @ 07:01  -  08-31-23 @ 07:00  --------------------------------------------------------  IN:    Amiodarone: 199.8 mL    IV PiggyBack: 100 mL  Total IN: 299.8 mL    OUT:    Voided (mL): 1100 mL  Total OUT: 1100 mL    Total NET: -800.2 mL          PHYSICAL EXAM:  PHYSICAL EXAM:  GENERAL: No acute distress, well-developed  CHEST/LUNG: CTAB;   HEART: Regular rate and rhythm.   ABDOMEN: Soft, non-tender, non-distended  EXTREMITIES:  2+ peripheral pulses b/l, No clubbing, cyanosis, or edema      MEDICATIONS  (STANDING):  aMIOdarone Infusion 1 mG/Min (33.3 mL/Hr) IV Continuous <Continuous>  aspirin enteric coated 81 milliGRAM(s) Oral daily  atorvastatin 20 milliGRAM(s) Oral at bedtime  dextrose 5%. 1000 milliLiter(s) (100 mL/Hr) IV Continuous <Continuous>  dextrose 5%. 1000 milliLiter(s) (50 mL/Hr) IV Continuous <Continuous>  dextrose 50% Injectable 25 Gram(s) IV Push once  dextrose 50% Injectable 12.5 Gram(s) IV Push once  dextrose 50% Injectable 25 Gram(s) IV Push once  enoxaparin Injectable 90 milliGRAM(s) SubCutaneous every 12 hours  gabapentin 100 milliGRAM(s) Oral daily  glucagon  Injectable 1 milliGRAM(s) IntraMuscular once  insulin lispro (ADMELOG) corrective regimen sliding scale   SubCutaneous three times a day before meals  pantoprazole    Tablet 40 milliGRAM(s) Oral before breakfast  piperacillin/tazobactam IVPB.. 3.375 Gram(s) IV Intermittent every 8 hours  sodium chloride 0.9%. 1000 milliLiter(s) (75 mL/Hr) IV Continuous <Continuous>    MEDICATIONS  (PRN):  acetaminophen     Tablet .. 650 milliGRAM(s) Oral every 6 hours PRN Temp greater or equal to 38C (100.4F), Mild Pain (1 - 3)  aluminum hydroxide/magnesium hydroxide/simethicone Suspension 30 milliLiter(s) Oral every 4 hours PRN Dyspepsia  dextrose Oral Gel 15 Gram(s) Oral once PRN Blood Glucose LESS THAN 70 milliGRAM(s)/deciliter  melatonin 5 milliGRAM(s) Oral at bedtime PRN Insomnia  ondansetron Injectable 4 milliGRAM(s) IV Push every 8 hours PRN Nausea and/or Vomiting      DVT PROPHYLAXIS: enoxaparin Injectable 90 milliGRAM(s) SubCutaneous every 12 hours    GI PROPHYLAXIS: pantoprazole    Tablet 40 milliGRAM(s) Oral before breakfast    ANTICOAGULATION:   ANTIBIOTICS:  piperacillin/tazobactam IVPB.. 3.375 Gram(s)            LAB/STUDIES:  Labs:  CAPILLARY BLOOD GLUCOSE      POCT Blood Glucose.: 102 mg/dL (31 Aug 2023 11:25)  POCT Blood Glucose.: 93 mg/dL (31 Aug 2023 07:38)  POCT Blood Glucose.: 105 mg/dL (30 Aug 2023 20:54)  POCT Blood Glucose.: 108 mg/dL (30 Aug 2023 16:29)                          13.2   8.67  )-----------( 216      ( 31 Aug 2023 06:25 )             38.9       Auto Neutrophil %: 54.6 % (08-31-23 @ 06:25)  Auto Immature Granulocyte %: 0.6 % (08-31-23 @ 06:25)    08-31    138  |  100  |  14  ----------------------------<  96  3.9   |  26  |  0.6<L>      Calcium: 9.4 mg/dL (08-31-23 @ 06:25)      LFTs:             6.3  | 0.6  | 35       ------------------[25      ( 31 Aug 2023 06:25 )  3.6  | x    | 46          Lipase:x      Amylase:x         Lactate, Blood: 1.2 mmol/L (08-29-23 @ 06:31)      Coags:     14.40  ----< 1.25    ( 30 Aug 2023 12:30 )     x           CARDIAC MARKERS ( 29 Aug 2023 21:46 )  x     / <0.01 ng/mL / x     / x     / x              Urinalysis Basic - ( 31 Aug 2023 06:25 )    Color: x / Appearance: x / SG: x / pH: x  Gluc: 96 mg/dL / Ketone: x  / Bili: x / Urobili: x   Blood: x / Protein: x / Nitrite: x   Leuk Esterase: x / RBC: x / WBC x   Sq Epi: x / Non Sq Epi: x / Bacteria: x                IMAGING:      ACCESS/ DEVICES:  [x ] Peripheral IV  [ ] Central Venous Line	[ ] R	[ ] L	[ ] IJ	[ ] Fem	[ ] SC	Placed:   [ ] Arterial Line		[ ] R	[ ] L	[ ] Fem	[ ] Rad	[ ] Ax	Placed:   [ ] PICC:					[ ] Mediport  [ ] Urinary Catheter,  Date Placed:   [ ] Chest tube: [ ] Right, [ ] Left  [ ] WILMA/Josh Drains       Patient in with mom and dad for the flu shot.  Health Maintenance Due   Topic Date Due   • COVID-19 Vaccine (1) Never done   • Pneumococcal Vaccine 0-64 (1 - PPSV23) 12/05/2017   • Influenza Vaccine (1) 09/01/2023       Patient is due for topics as listed above but is not proceeding with Immunization(s) COVID-19 and Well Child Exam at this time.  Appt scheduled to perform Immunization(s) Influenza.  Immunization History   Administered Date(s) Administered   • DTaP, 5 Pertussis Antigens (DAPTACEL) 12/11/2017   • DTaP/HIB/IPV 2016, 2016, 03/03/2017   • DTaP/IPV 08/14/2020   • Hep A, ped/adol, 2 dose 12/11/2017, 08/17/2018   • Hep B, adolescent or pediatric 2016, 2016, 03/03/2017   • Hib (PRP-T) 12/11/2017   • Influenza, injectable, quadrivalent, preservative free, pediatric 02/08/2018   • Influenza, quadrivalent, preserve-free 11/19/2018, 10/04/2019, 10/09/2020, 10/20/2021, 11/09/2022   • MMR 10/10/2017   • Measles Mumps Rubella Varicella 08/14/2020   • Pneumococcal Conjugate 13 Valent Vacc (Prevnar 13) 2016, 2016, 03/03/2017, 10/10/2017   • Rotavirus - pentavalent 2016, 2016, 03/03/2017   • Varicella 10/10/2017

## 2024-07-20 NOTE — H&P ADULT - NSHPSOURCEINFORD_GEN_ALL_CORE
Problem: Discharge Planning  Goal: Discharge to home or other facility with appropriate resources  7/20/2024 1122 by Tristan Wilder, RN  Outcome: Progressing  Flowsheets (Taken 7/20/2024 1122)  Discharge to home or other facility with appropriate resources: Identify barriers to discharge with patient and caregiver     Problem: Safety - Adult  Goal: Free from fall injury  7/20/2024 1122 by Tristan Wilder, RN  Outcome: Progressing  Flowsheets (Taken 7/20/2024 1122)  Free From Fall Injury: Instruct family/caregiver on patient safety     Problem: ABCDS Injury Assessment  Goal: Absence of physical injury  7/19/2024 2252 by Taylor Landon RN  Outcome: Progressing  Flowsheets (Taken 7/19/2024 2252)  Absence of Physical Injury: Implement safety measures based on patient assessment      Chart(s)/Patient

## 2025-01-09 NOTE — PATIENT PROFILE ADULT - NSTRANSFERBELONGINGSDISPO_GEN_A_NUR
Subjective   History was provided by the   Melany Kaur Angella Steele is a 15 m.o. female who is brought in for this 15 month well child visit.    Current Issues:  Current concerns include   -Interval visits: no further visits needed with dermatology for eczema; follows  for egg and peanut allergies- blood work done that showed true allergy to peanuts; planning for baked egg challenge in  office  -Eczema flare on her face right now  -Much quieter / not saying as many words as her older sister at similar age  -Feeding concerns: doesn't seem to like solid foods, will spit it out, seems like she has a hard time chewing foods, never has choking episodes, never coughs    Review of Nutrition, Elimination, and Sleep:  Current diet: milk and some purees  Difficulties with feeding? Yes, see above  Elimination: no issues  Sleep: no concerns    Screening Questions:  Current child-care arrangements: stays home with parent  Hearing or vision concerns? No  Brushing teeth? Yes    Development:  Social/emotional: Shows toys, shows affection  Language: not saying many words, points when wants something  Physical: Takes independent steps, feeds self, starting to scribble    History reviewed. No pertinent past medical history.    History reviewed. No pertinent surgical history.    Family History   Problem Relation Name Age of Onset    No Known Problems Mother Ivania Steele     No Known Problems Father      No Known Problems Maternal Grandmother          Copied from mother's family history at birth       Current Outpatient Medications on File Prior to Visit   Medication Sig Dispense Refill    desonide (DesOwen) 0.05 % ointment Use twice a day on the face to affected areas until flat and smooth 60 g 3    triamcinolone (Kenalog) 0.1 % ointment Use twice a day on the body on affected areas (rough and pink) until flat and smooth 80 g 3    cetirizine (ZyrTEC) 5 mg/5 mL solution oral solution Take 2 mL (2 mg) by mouth once daily. (Patient not  "taking: Reported on 1/9/2025) 60 mL 11    EPINEPHrine (Epipen-JR) 0.15 mg/0.3 mL injection syringe Inject 0.15 mg into a thigh muscle and hold for 3 second.  If the symptoms don't improve after 10 minutes, repeat the dose and call 911. (Patient not taking: Reported on 1/9/2025) 4 each 1    hydrocortisone 2.5 % cream Apply twice daily to affected areas of face (avoid eyes) for 2 weeks, then taper to twice daily on weekends only; repeat every 6-8 weeks as needed for flares (Patient not taking: Reported on 1/9/2025) 30 g 2    hydrOXYzine HCL 10 mg/5 mL (5 mL) solution Take 1 Dose by mouth see administration instructions. Take 5ml to 7.5 ml 30 minutes before bedtime. (Patient not taking: Reported on 1/9/2025) 300 mL 2     No current facility-administered medications on file prior to visit.       Allergies   Allergen Reactions    Peanut Other    Dog Dander Itching and Rash    Egg Hives, Itching and Rash       Objective   Visit Vitals  Ht 0.762 m (2' 6\")   Wt 9.639 kg   HC 44 cm   BMI 16.60 kg/m²   Smoking Status Never Assessed   BSA 0.45 m²        General:   alert and oriented, in no acute distress   Skin:   +eczema flare to face   Head:   normal fontanelles, normocephalic    Eyes:   sclerae white, red reflex normal bilaterally, corneal light reflex symmetric   Ears:   normal TMs bilaterally   Mouth:   Normal, healthy teeth   Lungs:   clear to auscultation bilaterally   Heart:   regular rate and rhythm, S1, S2 normal, no murmur, click, rub or gallop   Abdomen:   soft, non-tender; bowel sounds normal; no masses, no organomegaly   Screening DDH:   leg length symmetrical   :   normal female external genitalia   Extremities:   extremities normal, warm and well-perfused; no cyanosis, clubbing, or edema   Neuro:   alert, moves all extremities spontaneously, gait normal, sits without support, no head lag     Assessment/Plan   1. Encounter for routine child health examination without abnormal findings        2. Encounter for " immunization        3. Delayed vaccination        4. Speech delay        5. Feeding difficulties  Referral to Occupational Therapy      6. Infantile eczema        7. Egg allergy        8. Peanut allergy          Anticipatory guidance discussed: nutrition, regular dental brushing, safety.     Melany is doing very well! Healthy 15 m.o. female infant.  1. Excellent growth    2/3. Immunizations due: MMR, Varicella, Hepatitis A, Hiberix, Prevnar. Delaying today due to current family's preference during current eczema flare. Will try at 18-month visit, or can always return on nurse visit schedule to get caught up.    4. Referral submitted to [facility] today for speech therapy    5. Referral submitted to [facility] to help with feeding skills. Please let me know if any issues with setting up therapies    6. Continue maintenance care recommended by dermatology    7/8. Strict avoidance of peanuts. Planning for baked egg challenge in allergy office in the future    Follow up in 3 months for 18 month well-check, or sooner with concerns.      Varsha Willingham MD   not applicable

## 2025-06-06 ENCOUNTER — EMERGENCY (EMERGENCY)
Facility: HOSPITAL | Age: 75
LOS: 0 days | Discharge: LEFT BEFORE DISCHARGE | End: 2025-06-06
Attending: EMERGENCY MEDICINE
Payer: MEDICARE

## 2025-06-06 VITALS
HEART RATE: 80 BPM | RESPIRATION RATE: 18 BRPM | DIASTOLIC BLOOD PRESSURE: 74 MMHG | HEIGHT: 72 IN | WEIGHT: 184.97 LBS | SYSTOLIC BLOOD PRESSURE: 124 MMHG

## 2025-06-06 DIAGNOSIS — R10.811 RIGHT UPPER QUADRANT ABDOMINAL TENDERNESS: ICD-10-CM

## 2025-06-06 DIAGNOSIS — Z87.19 PERSONAL HISTORY OF OTHER DISEASES OF THE DIGESTIVE SYSTEM: ICD-10-CM

## 2025-06-06 DIAGNOSIS — Z53.29 PROCEDURE AND TREATMENT NOT CARRIED OUT BECAUSE OF PATIENT'S DECISION FOR OTHER REASONS: ICD-10-CM

## 2025-06-06 DIAGNOSIS — I45.10 UNSPECIFIED RIGHT BUNDLE-BRANCH BLOCK: ICD-10-CM

## 2025-06-06 DIAGNOSIS — K76.9 LIVER DISEASE, UNSPECIFIED: ICD-10-CM

## 2025-06-06 LAB
ALBUMIN SERPL ELPH-MCNC: 3.9 G/DL — SIGNIFICANT CHANGE UP (ref 3.5–5.2)
ALP SERPL-CCNC: 37 U/L — SIGNIFICANT CHANGE UP (ref 30–115)
ALT FLD-CCNC: 16 U/L — SIGNIFICANT CHANGE UP (ref 0–41)
ANION GAP SERPL CALC-SCNC: 15 MMOL/L — HIGH (ref 7–14)
APTT BLD: 34.6 SEC — SIGNIFICANT CHANGE UP (ref 27–39.2)
AST SERPL-CCNC: 16 U/L — SIGNIFICANT CHANGE UP (ref 0–41)
BASOPHILS # BLD AUTO: 0.05 K/UL — SIGNIFICANT CHANGE UP (ref 0–0.2)
BASOPHILS NFR BLD AUTO: 0.5 % — SIGNIFICANT CHANGE UP (ref 0–1)
BILIRUB SERPL-MCNC: 0.3 MG/DL — SIGNIFICANT CHANGE UP (ref 0.2–1.2)
BUN SERPL-MCNC: 17 MG/DL — SIGNIFICANT CHANGE UP (ref 10–20)
CALCIUM SERPL-MCNC: 10.3 MG/DL — SIGNIFICANT CHANGE UP (ref 8.4–10.5)
CHLORIDE SERPL-SCNC: 101 MMOL/L — SIGNIFICANT CHANGE UP (ref 98–110)
CO2 SERPL-SCNC: 24 MMOL/L — SIGNIFICANT CHANGE UP (ref 17–32)
CREAT SERPL-MCNC: 0.9 MG/DL — SIGNIFICANT CHANGE UP (ref 0.7–1.5)
EGFR: 89 ML/MIN/1.73M2 — SIGNIFICANT CHANGE UP
EGFR: 89 ML/MIN/1.73M2 — SIGNIFICANT CHANGE UP
EOSINOPHIL # BLD AUTO: 0.04 K/UL — SIGNIFICANT CHANGE UP (ref 0–0.7)
EOSINOPHIL NFR BLD AUTO: 0.4 % — SIGNIFICANT CHANGE UP (ref 0–8)
GLUCOSE SERPL-MCNC: 127 MG/DL — HIGH (ref 70–99)
HCT VFR BLD CALC: 41.2 % — LOW (ref 42–52)
HGB BLD-MCNC: 13.7 G/DL — LOW (ref 14–18)
IMM GRANULOCYTES NFR BLD AUTO: 0.3 % — SIGNIFICANT CHANGE UP (ref 0.1–0.3)
INR BLD: 1.34 RATIO — HIGH (ref 0.65–1.3)
LACTATE SERPL-SCNC: 2.4 MMOL/L — HIGH (ref 0.7–2)
LYMPHOCYTES # BLD AUTO: 2.3 K/UL — SIGNIFICANT CHANGE UP (ref 1.2–3.4)
LYMPHOCYTES # BLD AUTO: 23.9 % — SIGNIFICANT CHANGE UP (ref 20.5–51.1)
MCHC RBC-ENTMCNC: 28.7 PG — SIGNIFICANT CHANGE UP (ref 27–31)
MCHC RBC-ENTMCNC: 33.3 G/DL — SIGNIFICANT CHANGE UP (ref 32–37)
MCV RBC AUTO: 86.2 FL — SIGNIFICANT CHANGE UP (ref 80–94)
MONOCYTES # BLD AUTO: 1.26 K/UL — HIGH (ref 0.1–0.6)
MONOCYTES NFR BLD AUTO: 13.1 % — HIGH (ref 1.7–9.3)
NEUTROPHILS # BLD AUTO: 5.93 K/UL — SIGNIFICANT CHANGE UP (ref 1.4–6.5)
NEUTROPHILS NFR BLD AUTO: 61.8 % — SIGNIFICANT CHANGE UP (ref 42.2–75.2)
NRBC BLD AUTO-RTO: 0 /100 WBCS — SIGNIFICANT CHANGE UP (ref 0–0)
PLATELET # BLD AUTO: 322 K/UL — SIGNIFICANT CHANGE UP (ref 130–400)
PMV BLD: 10.5 FL — HIGH (ref 7.4–10.4)
POTASSIUM SERPL-MCNC: 4.3 MMOL/L — SIGNIFICANT CHANGE UP (ref 3.5–5)
POTASSIUM SERPL-SCNC: 4.3 MMOL/L — SIGNIFICANT CHANGE UP (ref 3.5–5)
PROT SERPL-MCNC: 6.8 G/DL — SIGNIFICANT CHANGE UP (ref 6–8)
PROTHROM AB SERPL-ACNC: 15.9 SEC — HIGH (ref 9.95–12.87)
RBC # BLD: 4.78 M/UL — SIGNIFICANT CHANGE UP (ref 4.7–6.1)
RBC # FLD: 12.2 % — SIGNIFICANT CHANGE UP (ref 11.5–14.5)
SODIUM SERPL-SCNC: 140 MMOL/L — SIGNIFICANT CHANGE UP (ref 135–146)
WBC # BLD: 9.61 K/UL — SIGNIFICANT CHANGE UP (ref 4.8–10.8)
WBC # FLD AUTO: 9.61 K/UL — SIGNIFICANT CHANGE UP (ref 4.8–10.8)

## 2025-06-06 PROCEDURE — 99283 EMERGENCY DEPT VISIT LOW MDM: CPT | Mod: 25

## 2025-06-06 PROCEDURE — 87040 BLOOD CULTURE FOR BACTERIA: CPT

## 2025-06-06 PROCEDURE — 85025 COMPLETE CBC W/AUTO DIFF WBC: CPT

## 2025-06-06 PROCEDURE — 93005 ELECTROCARDIOGRAM TRACING: CPT

## 2025-06-06 PROCEDURE — 85730 THROMBOPLASTIN TIME PARTIAL: CPT

## 2025-06-06 PROCEDURE — 83605 ASSAY OF LACTIC ACID: CPT

## 2025-06-06 PROCEDURE — 36000 PLACE NEEDLE IN VEIN: CPT

## 2025-06-06 PROCEDURE — 99285 EMERGENCY DEPT VISIT HI MDM: CPT

## 2025-06-06 PROCEDURE — 93010 ELECTROCARDIOGRAM REPORT: CPT

## 2025-06-06 PROCEDURE — 80053 COMPREHEN METABOLIC PANEL: CPT

## 2025-06-06 PROCEDURE — 85610 PROTHROMBIN TIME: CPT

## 2025-06-06 PROCEDURE — 36415 COLL VENOUS BLD VENIPUNCTURE: CPT

## 2025-06-06 NOTE — ED ADULT NURSE NOTE - NS ED NOTE  TALK SOMEONE YN
MD Notification    Notified Person: MD    Notified Person Name: On-Call Hospitalist     Notification Date/Time: 5/10 @ 2212    Notification Interaction: Answering service    Purpose of Notification: Pt does not have any PTA pain meds ordered    Orders Received:    Comments:     No

## 2025-06-06 NOTE — ED PROVIDER NOTE - PROGRESS NOTE DETAILS
Dr. Zayas - The patient does not want to stay in the hospital, expresses to me in my initial evaluation that if he cannot have the procedure this weekend he to sign out AGAINST MEDICAL ADVICE. Dr. Zayas - Case discussed with radiology, recommendations provided, communicated with patient he has awareness of the risks and benefits of his decision, I have suggested admission for continued care to optimize scheduling of interventional radiology procedure.  I have also suggested repeat imaging in the ED as we cannot get access to his outpatient images, still waiting for the report to get faxed to us but no disc is availble at this time. Patient expresses understanding of these recommendations.

## 2025-06-06 NOTE — ED PROVIDER NOTE - PATIENT PORTAL LINK FT
You can access the FollowMyHealth Patient Portal offered by [facility] by registering at the following website: [email or web address]. By joining [facility]’s FollowMyHealth portal, you will also be able to view your health information using other applications (apps) compatible with our system.

## 2025-06-06 NOTE — ED PROVIDER NOTE - PHYSICAL EXAMINATION
GEN: male in no distress.   HEENT: non icteric conjunctiva pink. mucosa normal. throat normal. EOMI PERRLA  CHEST: CTA bilateral. normal work of breathing. no accessory muscle use  NECK: normal ROM no masses  CV: pulses intact S1S2  ABD: soft, non rigid, RUQ tender to palpation, no guarding noted, non distended, no rebound  EXT: FROM x 4 NVI no edema  NEURO: AAO 3 no focal deficits. Gait memory speech cognition and coordination grossly intact.   SKIN: no pallor no diaphoresis  PSYCH: normal mood and mentation

## 2025-06-06 NOTE — ED PROVIDER NOTE - CONSIDERATION OF ADMISSION OBSERVATION
Consideration of Admission/Observation Plan was for admission for interventional radiology procedure scheduling, potential surgical consult, evaluation for sepsis, IV fluid administration

## 2025-06-06 NOTE — ED PROVIDER NOTE - OBJECTIVE STATEMENT
I personally evaluated the patient. I reviewed the Resident's or Physician Assistant's note (as assigned above), and agree with the findings and plan except as documented in my note.    74-year-old male presents the emergency department at the advice of his wrists, after having a CT scan done today which demonstrated a "rim-enhancing lesion" in his liver concerning for an abscess.  Patient complains of right upper quadrant pain, worse at night but no other symptoms.    The review of systems is otherwise unremarkable.  He denies fever, night sweats, chills, travel history, other complaints.  The patient is a smoker.

## 2025-06-06 NOTE — ED PROVIDER NOTE - NSFOLLOWUPINSTRUCTIONS_ED_ALL_ED_FT
Return to either [facility] or [facility] ED to be admitted to continue this liver abscess workup.

## 2025-06-06 NOTE — ED PROVIDER NOTE - CLINICAL SUMMARY MEDICAL DECISION MAKING FREE TEXT BOX
74-year-old male presents to the ED for right upper quadrant pain, advised to come to the ED by his PMDs GI Dr. Bay's office after an outpatient CT scan revealed a ring-enhancing lesion with callback by radiology to their office.  Patient complains of right upper quadrant pain, right flank pain, worse at night, of unknown etiology.  For his history of pancreatitis in the past and is a smoker.  The patient expresses he does not want to stay in the hospital, have explained the logistical challenges of setting up an urgent interventional radiology procedure and suggest that he have repeat imaging in the ED and admission for continued care.  His wife is present bedside and has decisional capacity, and he prefers to leave AMA at this time with plan for repeat evaluation on Monday, or also Sunday evening.  Specific situations such as worsening of his findings, rupture, sepsis have been advised to him by me and he and his wife expressed understanding of the risks.  Will discharge AGAINST MEDICAL ADVICE.

## 2025-06-06 NOTE — ED PROVIDER NOTE - DIFFERENTIAL DIAGNOSIS
Differential includes liver abscess, liver cancer, hemangioma, right upper quadrant pain. Differential Diagnosis

## 2025-06-09 ENCOUNTER — EMERGENCY (EMERGENCY)
Facility: HOSPITAL | Age: 75
LOS: 0 days | Discharge: LEFT BEFORE DISCHARGE | End: 2025-06-09
Attending: EMERGENCY MEDICINE
Payer: MEDICARE

## 2025-06-09 VITALS
WEIGHT: 190.04 LBS | TEMPERATURE: 98 F | RESPIRATION RATE: 19 BRPM | DIASTOLIC BLOOD PRESSURE: 79 MMHG | HEART RATE: 63 BPM | HEIGHT: 72 IN | SYSTOLIC BLOOD PRESSURE: 156 MMHG | OXYGEN SATURATION: 95 %

## 2025-06-09 DIAGNOSIS — K21.9 GASTRO-ESOPHAGEAL REFLUX DISEASE WITHOUT ESOPHAGITIS: ICD-10-CM

## 2025-06-09 DIAGNOSIS — E11.9 TYPE 2 DIABETES MELLITUS WITHOUT COMPLICATIONS: ICD-10-CM

## 2025-06-09 DIAGNOSIS — I48.91 UNSPECIFIED ATRIAL FIBRILLATION: ICD-10-CM

## 2025-06-09 DIAGNOSIS — K59.00 CONSTIPATION, UNSPECIFIED: ICD-10-CM

## 2025-06-09 DIAGNOSIS — Z53.29 PROCEDURE AND TREATMENT NOT CARRIED OUT BECAUSE OF PATIENT'S DECISION FOR OTHER REASONS: ICD-10-CM

## 2025-06-09 DIAGNOSIS — I10 ESSENTIAL (PRIMARY) HYPERTENSION: ICD-10-CM

## 2025-06-09 DIAGNOSIS — Z79.01 LONG TERM (CURRENT) USE OF ANTICOAGULANTS: ICD-10-CM

## 2025-06-09 DIAGNOSIS — E78.5 HYPERLIPIDEMIA, UNSPECIFIED: ICD-10-CM

## 2025-06-09 DIAGNOSIS — Z87.19 PERSONAL HISTORY OF OTHER DISEASES OF THE DIGESTIVE SYSTEM: ICD-10-CM

## 2025-06-09 LAB
ALBUMIN SERPL ELPH-MCNC: 3.7 G/DL — SIGNIFICANT CHANGE UP (ref 3.5–5.2)
ALP SERPL-CCNC: 37 U/L — SIGNIFICANT CHANGE UP (ref 30–115)
ALT FLD-CCNC: 15 U/L — SIGNIFICANT CHANGE UP (ref 0–41)
ANION GAP SERPL CALC-SCNC: 13 MMOL/L — SIGNIFICANT CHANGE UP (ref 7–14)
APTT BLD: 35.6 SEC — SIGNIFICANT CHANGE UP (ref 27–39.2)
AST SERPL-CCNC: 16 U/L — SIGNIFICANT CHANGE UP (ref 0–41)
BASOPHILS # BLD AUTO: 0.04 K/UL — SIGNIFICANT CHANGE UP (ref 0–0.2)
BASOPHILS NFR BLD AUTO: 0.4 % — SIGNIFICANT CHANGE UP (ref 0–1)
BILIRUB SERPL-MCNC: 0.4 MG/DL — SIGNIFICANT CHANGE UP (ref 0.2–1.2)
BUN SERPL-MCNC: 14 MG/DL — SIGNIFICANT CHANGE UP (ref 10–20)
CALCIUM SERPL-MCNC: 10.5 MG/DL — SIGNIFICANT CHANGE UP (ref 8.4–10.5)
CHLORIDE SERPL-SCNC: 101 MMOL/L — SIGNIFICANT CHANGE UP (ref 98–110)
CO2 SERPL-SCNC: 24 MMOL/L — SIGNIFICANT CHANGE UP (ref 17–32)
CREAT SERPL-MCNC: 0.8 MG/DL — SIGNIFICANT CHANGE UP (ref 0.7–1.5)
EGFR: 92 ML/MIN/1.73M2 — SIGNIFICANT CHANGE UP
EGFR: 92 ML/MIN/1.73M2 — SIGNIFICANT CHANGE UP
EOSINOPHIL # BLD AUTO: 0.09 K/UL — SIGNIFICANT CHANGE UP (ref 0–0.7)
EOSINOPHIL NFR BLD AUTO: 0.8 % — SIGNIFICANT CHANGE UP (ref 0–8)
GAS PNL BLDV: SIGNIFICANT CHANGE UP
GLUCOSE SERPL-MCNC: 93 MG/DL — SIGNIFICANT CHANGE UP (ref 70–99)
HCT VFR BLD CALC: 40.8 % — LOW (ref 42–52)
HGB BLD-MCNC: 13.4 G/DL — LOW (ref 14–18)
IMM GRANULOCYTES NFR BLD AUTO: 0.3 % — SIGNIFICANT CHANGE UP (ref 0.1–0.3)
INR BLD: 1.58 RATIO — HIGH (ref 0.65–1.3)
LYMPHOCYTES # BLD AUTO: 2.14 K/UL — SIGNIFICANT CHANGE UP (ref 1.2–3.4)
LYMPHOCYTES # BLD AUTO: 20.2 % — LOW (ref 20.5–51.1)
MCHC RBC-ENTMCNC: 28.2 PG — SIGNIFICANT CHANGE UP (ref 27–31)
MCHC RBC-ENTMCNC: 32.8 G/DL — SIGNIFICANT CHANGE UP (ref 32–37)
MCV RBC AUTO: 85.9 FL — SIGNIFICANT CHANGE UP (ref 80–94)
MONOCYTES # BLD AUTO: 1.56 K/UL — HIGH (ref 0.1–0.6)
MONOCYTES NFR BLD AUTO: 14.7 % — HIGH (ref 1.7–9.3)
NEUTROPHILS # BLD AUTO: 6.75 K/UL — HIGH (ref 1.4–6.5)
NEUTROPHILS NFR BLD AUTO: 63.6 % — SIGNIFICANT CHANGE UP (ref 42.2–75.2)
NRBC BLD AUTO-RTO: 0 /100 WBCS — SIGNIFICANT CHANGE UP (ref 0–0)
PLATELET # BLD AUTO: 332 K/UL — SIGNIFICANT CHANGE UP (ref 130–400)
PMV BLD: 10.8 FL — HIGH (ref 7.4–10.4)
POTASSIUM SERPL-MCNC: 4.8 MMOL/L — SIGNIFICANT CHANGE UP (ref 3.5–5)
POTASSIUM SERPL-SCNC: 4.8 MMOL/L — SIGNIFICANT CHANGE UP (ref 3.5–5)
PROT SERPL-MCNC: 6.3 G/DL — SIGNIFICANT CHANGE UP (ref 6–8)
PROTHROM AB SERPL-ACNC: 18.8 SEC — HIGH (ref 9.95–12.87)
RBC # BLD: 4.75 M/UL — SIGNIFICANT CHANGE UP (ref 4.7–6.1)
RBC # FLD: 12.4 % — SIGNIFICANT CHANGE UP (ref 11.5–14.5)
SODIUM SERPL-SCNC: 138 MMOL/L — SIGNIFICANT CHANGE UP (ref 135–146)
WBC # BLD: 10.61 K/UL — SIGNIFICANT CHANGE UP (ref 4.8–10.8)
WBC # FLD AUTO: 10.61 K/UL — SIGNIFICANT CHANGE UP (ref 4.8–10.8)

## 2025-06-09 PROCEDURE — 80053 COMPREHEN METABOLIC PANEL: CPT

## 2025-06-09 PROCEDURE — 36415 COLL VENOUS BLD VENIPUNCTURE: CPT

## 2025-06-09 PROCEDURE — 85018 HEMOGLOBIN: CPT

## 2025-06-09 PROCEDURE — 82330 ASSAY OF CALCIUM: CPT

## 2025-06-09 PROCEDURE — 84295 ASSAY OF SERUM SODIUM: CPT

## 2025-06-09 PROCEDURE — 96374 THER/PROPH/DIAG INJ IV PUSH: CPT

## 2025-06-09 PROCEDURE — 84132 ASSAY OF SERUM POTASSIUM: CPT

## 2025-06-09 PROCEDURE — 71045 X-RAY EXAM CHEST 1 VIEW: CPT | Mod: 26

## 2025-06-09 PROCEDURE — 83605 ASSAY OF LACTIC ACID: CPT

## 2025-06-09 PROCEDURE — 85610 PROTHROMBIN TIME: CPT

## 2025-06-09 PROCEDURE — 85025 COMPLETE CBC W/AUTO DIFF WBC: CPT

## 2025-06-09 PROCEDURE — 71045 X-RAY EXAM CHEST 1 VIEW: CPT

## 2025-06-09 PROCEDURE — 85730 THROMBOPLASTIN TIME PARTIAL: CPT

## 2025-06-09 PROCEDURE — 82803 BLOOD GASES ANY COMBINATION: CPT

## 2025-06-09 PROCEDURE — 85014 HEMATOCRIT: CPT

## 2025-06-09 PROCEDURE — 87040 BLOOD CULTURE FOR BACTERIA: CPT

## 2025-06-09 PROCEDURE — 99285 EMERGENCY DEPT VISIT HI MDM: CPT | Mod: 25

## 2025-06-09 PROCEDURE — 99285 EMERGENCY DEPT VISIT HI MDM: CPT

## 2025-06-09 RX ORDER — METRONIDAZOLE 250 MG
1 TABLET ORAL
Qty: 21 | Refills: 0
Start: 2025-06-09 | End: 2025-06-15

## 2025-06-09 RX ORDER — SODIUM CHLORIDE 9 G/1000ML
2700 INJECTION, SOLUTION INTRAVENOUS ONCE
Refills: 0 | Status: DISCONTINUED | OUTPATIENT
Start: 2025-06-09 | End: 2025-06-09

## 2025-06-09 RX ORDER — SODIUM CHLORIDE 9 G/1000ML
1000 INJECTION, SOLUTION INTRAVENOUS ONCE
Refills: 0 | Status: COMPLETED | OUTPATIENT
Start: 2025-06-09 | End: 2025-06-09

## 2025-06-09 RX ORDER — CIPROFLOXACIN HCL 250 MG
1 TABLET ORAL
Qty: 14 | Refills: 0
Start: 2025-06-09 | End: 2025-06-15

## 2025-06-09 RX ORDER — PIPERACILLIN-TAZO-DEXTROSE,ISO 3.375G/5
3.38 IV SOLUTION, PIGGYBACK PREMIX FROZEN(ML) INTRAVENOUS ONCE
Refills: 0 | Status: COMPLETED | OUTPATIENT
Start: 2025-06-09 | End: 2025-06-09

## 2025-06-09 RX ADMIN — Medication 200 GRAM(S): at 09:15

## 2025-06-09 RX ADMIN — SODIUM CHLORIDE 1000 MILLILITER(S): 9 INJECTION, SOLUTION INTRAVENOUS at 09:15

## 2025-06-09 NOTE — ED ADULT NURSE NOTE - OBJECTIVE STATEMENT
Pt states he was seen at [facility] and told he had a liver abscess in which he needed to f/u w/ IR at [facility]. c/o mild abd. pain.

## 2025-06-09 NOTE — ED PROVIDER NOTE - OBJECTIVE STATEMENT
74-year-old male with history of HTN, HLD, DM, A-fib on Eliquis, GERD and pancreatitis presents for evaluation of liver abscess.  Patient was sent to get a CAT scan outpatient by his GI doctor.  He received the results on Friday 6/6, and subsequently his GI doctor sent him in for drainage.  Patient presented at [facility] and was told that they do not have IR there.  Patient did not want to stay or get repeat imaging.  She endorses right flank abdominal discomfort and decreased appetite for the past 3 weeks, but denies recent fevers, chills, sweats, nausea, vomiting, diarrhea or urinary symptoms.  He does state that he has constipation, however he is not been eating well recently.

## 2025-06-09 NOTE — CONSULT NOTE ADULT - SUBJECTIVE AND OBJECTIVE BOX
INTERVENTIONAL RADIOLOGY CONSULT:     Procedure Requested:     HPI:      PAST MEDICAL & SURGICAL HISTORY:  High cholesterol      Elevated cholesterol with high triglycerides      Pancreatitis      Acid reflux      BP (high blood pressure)      No significant past surgical history          MEDICATIONS  (STANDING):    MEDICATIONS  (PRN):      Allergies    No Known Allergies    Intolerances          FAMILY HISTORY:      Physical Exam:   Vital Signs Last 24 Hrs  T(C): 36.7 (09 Jun 2025 07:05), Max: 36.7 (09 Jun 2025 07:05)  T(F): 98 (09 Jun 2025 07:05), Max: 98 (09 Jun 2025 07:05)  HR: 63 (09 Jun 2025 07:05) (63 - 63)  BP: 156/79 (09 Jun 2025 07:05) (156/79 - 156/79)  BP(mean): --  RR: 19 (09 Jun 2025 07:05) (19 - 19)  SpO2: 95% (09 Jun 2025 07:05) (95% - 95%)    Parameters below as of 09 Jun 2025 07:05  Patient On (Oxygen Delivery Method): room air          Labs:                         13.4   10.61 )-----------( 332      ( 09 Jun 2025 08:05 )             40.8     06-09    138  |  101  |  14  ----------------------------<  93  4.8   |  24  |  0.8    Ca    10.5      09 Jun 2025 08:05    TPro  6.3  /  Alb  3.7  /  TBili  0.4  /  DBili  x   /  AST  16  /  ALT  15  /  AlkPhos  37  06-09    PT/INR - ( 09 Jun 2025 08:05 )   PT: 18.80 sec;   INR: 1.58 ratio         PTT - ( 09 Jun 2025 08:05 )  PTT:35.6 sec    Pertinent labs:                      13.4   10.61 )-----------( 332      ( 09 Jun 2025 08:05 )             40.8       06-09    138  |  101  |  14  ----------------------------<  93  4.8   |  24  |  0.8    Ca    10.5      09 Jun 2025 08:05    TPro  6.3  /  Alb  3.7  /  TBili  0.4  /  DBili  x   /  AST  16  /  ALT  15  /  AlkPhos  37  06-09      PT/INR - ( 09 Jun 2025 08:05 )   PT: 18.80 sec;   INR: 1.58 ratio         PTT - ( 09 Jun 2025 08:05 )  PTT:35.6 sec    Radiology & Additional Studies:     Radiology imaging reviewed.       ASSESSMENT AND PLAN:        -NPO after midnight  -draw CBC/CMP/Coags prior to procedure  -hold anticoagulation until after procedure    Please call Interventional Radiology with questions or concerns:   - M-F [phone]: [phone]   - All other hours: [phone]   INTERVENTIONAL RADIOLOGY CONSULT:     Procedure Requested:     HPI:      PAST MEDICAL & SURGICAL HISTORY:  High cholesterol      Elevated cholesterol with high triglycerides      Pancreatitis      Acid reflux      BP (high blood pressure)      No significant past surgical history          MEDICATIONS  (STANDING):    MEDICATIONS  (PRN):      Allergies    No Known Allergies    Intolerances          FAMILY HISTORY:      Physical Exam:   Vital Signs Last 24 Hrs  T(C): 36.7 (09 Jun 2025 07:05), Max: 36.7 (09 Jun 2025 07:05)  T(F): 98 (09 Jun 2025 07:05), Max: 98 (09 Jun 2025 07:05)  HR: 63 (09 Jun 2025 07:05) (63 - 63)  BP: 156/79 (09 Jun 2025 07:05) (156/79 - 156/79)  BP(mean): --  RR: 19 (09 Jun 2025 07:05) (19 - 19)  SpO2: 95% (09 Jun 2025 07:05) (95% - 95%)    Parameters below as of 09 Jun 2025 07:05  Patient On (Oxygen Delivery Method): room air          Labs:                         13.4   10.61 )-----------( 332      ( 09 Jun 2025 08:05 )             40.8     06-09    138  |  101  |  14  ----------------------------<  93  4.8   |  24  |  0.8    Ca    10.5      09 Jun 2025 08:05    TPro  6.3  /  Alb  3.7  /  TBili  0.4  /  DBili  x   /  AST  16  /  ALT  15  /  AlkPhos  37  06-09    PT/INR - ( 09 Jun 2025 08:05 )   PT: 18.80 sec;   INR: 1.58 ratio         PTT - ( 09 Jun 2025 08:05 )  PTT:35.6 sec    Pertinent labs:                      13.4   10.61 )-----------( 332      ( 09 Jun 2025 08:05 )             40.8       06-09    138  |  101  |  14  ----------------------------<  93  4.8   |  24  |  0.8    Ca    10.5      09 Jun 2025 08:05    TPro  6.3  /  Alb  3.7  /  TBili  0.4  /  DBili  x   /  AST  16  /  ALT  15  /  AlkPhos  37  06-09      PT/INR - ( 09 Jun 2025 08:05 )   PT: 18.80 sec;   INR: 1.58 ratio         PTT - ( 09 Jun 2025 08:05 )  PTT:35.6 sec    Radiology & Additional Studies:     Radiology imaging reviewed.       ASSESSMENT AND PLAN:    #1. 6 cm rim-enhancing lesion with nearby inflammation in the inferior right lobe of the liver  - patient reports not on aspirin or other antiplatelet  - patient reports on eliquis  - patient needs to hold 4 doses of eliquis prior to procedure   - procedure can be done as an inpatient or outpatient later this week   -CBC/CMP/Coags prior to procedure    Please call Interventional Radiology with questions or concerns:   - M-F [phone]: [phone]   - All other hours: [phone]

## 2025-06-09 NOTE — ED PROVIDER NOTE - PATIENT PORTAL LINK FT
You can access the FollowMyHealth Patient Portal offered by [facility] by registering at the following website: [email or web address]. By joining [facility]’s FollowMyHealth portal, you will also be able to view your health information using other applications (apps) compatible with our system.

## 2025-06-09 NOTE — ED PROVIDER NOTE - NSFOLLOWUPINSTRUCTIONS_ED_ALL_ED_FT
The interventional radiology team will call you and schedule an appointment for you.  Call your cardiologist and ask them if you can stop taking Eliquis for 4 doses prior to the procedure.  You are not in A-fib when you presented to the emergency department.    Liver Abscess    WHAT YOU NEED TO KNOW:    A liver abscess is a collection of pus in the liver caused by bacteria, fungi, or parasites. You may have more than one abscess. The liver makes enzymes and bile that help digest food and gives your body energy. It also removes harmful material from your body, such as alcohol and other chemicals.  Abdominal Organs    DISCHARGE INSTRUCTIONS:    Call your local emergency number (911 in the [place]) if:    You have sudden trouble breathing.    You are vomiting or have seizures.  Call your doctor if:    You have pain in your abdomen or it feels garrett than normal.    You have a fever.    You have a cough or feel weak and achy.    You have a rash.    You have questions or concerns about your condition or care.  Medicines:    Medicine can help treat an infection caused by bacteria, a fungus, or a parasite.    Take your medicine as directed. Contact your healthcare provider if you think your medicine is not helping or if you have side effects. Tell your provider if you are allergic to any medicine. Keep a list of the medicines, vitamins, and herbs you take. Include the amounts, and when and why you take them. Bring the list or the pill bottles to follow-up visits. Carry your medicine list with you in case of an emergency.  Eat a variety of healthy foods: Healthy foods include fruits, vegetables, whole-grain breads, low-fat dairy products, beans, lean meats, and fish.  Healthy Foods    Do not drink alcohol: Alcohol can damage your liver and increase your risk for another abscess.    Prevent the spread of germs:    Wash your hands often. Wash your hands several times each day. Wash after you use the bathroom, change a child's diaper, and before you prepare or eat food. Use soap and water every time. Rub your soapy hands together, lacing your fingers. Wash the front and back of your hands, and in between your fingers. Use the fingers of one hand to scrub under the fingernails of the other hand. Wash for at least 20 seconds. Rinse with warm, running water. Then dry your hands with a clean towel or paper towel. Use hand  that contains alcohol if soap and water are not available.  Handwashing      Cover a sneeze or cough. Use a tissue that covers your mouth and nose. Throw the tissue away in a trash can right away. Use the bend of your arm if a tissue is not available. Wash your hands well with soap and water or use a hand .    Stay away from others while you are sick. Avoid crowds as much as possible.    Ask about vaccines you may need. Your healthcare provider can tell you if you should also get vaccines not listed below:  Ask your healthcare provider about the flu and pneumonia vaccines. All adults should get the flu (influenza) vaccine as soon as recommended each year, usually in September or October. The pneumonia vaccine is recommended for all adults aged 50 or older to prevent pneumococcal disease, such as pneumonia. Adults aged 19 to 49 years who are at high risk for pneumococcal disease should also receive the vaccine. You may need 1 dose or 2. The number depends on the vaccine used and your risk factors.    COVID-19 vaccines are given to adults as a shot. At least 1 dose of an updated vaccine is recommended for all adults. COVID-19 vaccines are updated throughout the year. Adults 65 or older need a second dose of updated vaccine at least 4 months after the first dose. Your healthcare provider can help you schedule all needed doses as updated vaccines become available.    Follow up with your doctor as directed: Write down your questions so you remember to ask them during your visits.

## 2025-06-09 NOTE — ED ADULT NURSE NOTE - CAS EDN DISCHARGE INTERVENTIONS
patient sent in by MD for low systolic of 60's, when ems arrived systolic in 90's. Pt c/o weakness. Pt dneies chest pain sob n/v. PHX htn, COPD, HLD. IV discontinued, cath removed intact

## 2025-06-09 NOTE — ED PROVIDER NOTE - PHYSICAL EXAMINATION
CONSTITUTIONAL: well-appearing, well nourished, non-toxic, NAD  HEAD: NCAT  EYES: EOMI, no scleral icterus  ENT: Dry mucous membranes  NECK:  Full ROM.   CARD: RRR  RESP: clear to ausculation b/l  ABD: soft, non-tender, No CVA tenderness  EXT: Full ROM, no bony tenderness, no pedal edema, no calf tenderness  NEURO: normal motor. normal sensory. Normal gait.  PSYCH: Cooperative, appropriate.

## 2025-06-09 NOTE — ED PROVIDER NOTE - CLINICAL SUMMARY MEDICAL DECISION MAKING FREE TEXT BOX
74-year-old male with history of HTN, HLD, DM, A-fib on Eliquis, GERD and pancreatitis presents for evaluation of liver abscess.  Patient was sent to get a CAT scan outpatient by his GI doctor.  He received the results on Friday 6/6, and subsequently his GI doctor sent him in for drainage.  Patient presented at [facility] and was told that they do not have IR there.  Patient did not want to stay or get repeat imaging.  She endorses right flank abdominal discomfort and decreased appetite for the past 3 weeks, but denies recent fevers, chills, sweats, nausea, vomiting, diarrhea or urinary symptoms.  Pt well appearing, no distress 74-year-old male with history of HTN, HLD, DM, A-fib on Eliquis, GERD and pancreatitis presents for evaluation of liver abscess.  Patient was sent to get a CAT scan outpatient by his GI doctor.  He received the results on Friday 6/6, and subsequently his GI doctor sent him in for drainage.  Patient presented at [facility] and was told that they do not have IR there.  Patient did not want to stay or get repeat imaging.  She endorses right flank abdominal discomfort and decreased appetite for the past 3 weeks, but denies recent fevers, chills, sweats, nausea, vomiting, diarrhea or urinary symptoms.  Pt well appearing, no distress. Plan to admit but didn't want to stay, [facility]

## 2025-06-10 NOTE — CHART NOTE - NSCHARTNOTEFT_GEN_A_CORE
[facility] MRN [id number] Hepatology / Pt already has established care 6/10 - JL    SPECIALTY: gastroenterology

## 2025-06-13 ENCOUNTER — TRANSCRIPTION ENCOUNTER (OUTPATIENT)
Age: 75
End: 2025-06-13

## 2025-06-13 ENCOUNTER — RESULT REVIEW (OUTPATIENT)
Age: 75
End: 2025-06-13

## 2025-06-13 ENCOUNTER — OUTPATIENT (OUTPATIENT)
Dept: OUTPATIENT SERVICES | Facility: HOSPITAL | Age: 75
LOS: 1 days | Discharge: HOME | End: 2025-06-13
Payer: MEDICARE

## 2025-06-13 VITALS
HEART RATE: 61 BPM | SYSTOLIC BLOOD PRESSURE: 140 MMHG | DIASTOLIC BLOOD PRESSURE: 58 MMHG | RESPIRATION RATE: 18 BRPM | OXYGEN SATURATION: 94 % | TEMPERATURE: 97 F

## 2025-06-13 VITALS
SYSTOLIC BLOOD PRESSURE: 132 MMHG | RESPIRATION RATE: 18 BRPM | HEART RATE: 53 BPM | HEIGHT: 72 IN | WEIGHT: 190.04 LBS | TEMPERATURE: 97 F | DIASTOLIC BLOOD PRESSURE: 68 MMHG | OXYGEN SATURATION: 96 %

## 2025-06-13 LAB
GLUCOSE BLDC GLUCOMTR-MCNC: 98 MG/DL — SIGNIFICANT CHANGE UP (ref 70–99)
GRAM STN FLD: SIGNIFICANT CHANGE UP
NIGHT BLUE STAIN TISS: SIGNIFICANT CHANGE UP
SPECIMEN SOURCE: SIGNIFICANT CHANGE UP
SPECIMEN SOURCE: SIGNIFICANT CHANGE UP

## 2025-06-13 PROCEDURE — 87186 SC STD MICRODIL/AGAR DIL: CPT

## 2025-06-13 PROCEDURE — 49405 IMAGE CATH FLUID COLXN VISC: CPT

## 2025-06-13 PROCEDURE — 87070 CULTURE OTHR SPECIMN AEROBIC: CPT

## 2025-06-13 PROCEDURE — 87116 MYCOBACTERIA CULTURE: CPT

## 2025-06-13 PROCEDURE — 87077 CULTURE AEROBIC IDENTIFY: CPT

## 2025-06-13 PROCEDURE — C1769: CPT

## 2025-06-13 PROCEDURE — 82962 GLUCOSE BLOOD TEST: CPT

## 2025-06-13 PROCEDURE — 87102 FUNGUS ISOLATION CULTURE: CPT

## 2025-06-13 PROCEDURE — 87075 CULTR BACTERIA EXCEPT BLOOD: CPT

## 2025-06-13 PROCEDURE — C1729: CPT

## 2025-06-13 PROCEDURE — 87205 SMEAR GRAM STAIN: CPT

## 2025-06-13 NOTE — ASU PATIENT PROFILE, ADULT - FALL HARM RISK - UNIVERSAL INTERVENTIONS
Bed in lowest position, wheels locked, appropriate side rails in place/Call bell, personal items and telephone in reach/Instruct patient to call for assistance before getting out of bed or chair/Non-slip footwear when patient is out of bed/[place] to call system/Physically safe environment - no spills, clutter or unnecessary equipment/Purposeful Proactive Rounding/Room/bathroom lighting operational, light cord in reach

## 2025-06-13 NOTE — ASU DISCHARGE PLAN (ADULT/PEDIATRIC) - FINANCIAL ASSISTANCE
[facility] provides services at a reduced cost to those who are determined to be eligible through [facility]’s financial assistance program. Information regarding [facility]’s financial assistance program can be found by going to [email or web address] or by calling [phone].

## 2025-06-13 NOTE — H&P ADULT - NSHPPHYSICALEXAM_GEN_ALL_CORE
NAD, lying in bed comfortably, irritable  HEAD: Atraumatic, Normocephalic  EYES: EOMI, PERRLA, conjunctiva and sclera clear  ENT: Moist mucous membranes  NECK: Supple, No JVD  CHEST/LUNG: Clear to auscultation bilaterally; No rales, rhonchi, wheezing, or rubs. Unlabored respirations  HEART: Regular rate and rhythm; No murmurs, rubs, or gallops  ABDOMEN: BSx4; Soft, nondistended,   EXTREMITIES: 2+ Peripheral Pulses, brisk capillary refill. No clubbing, cyanosis, or edema  NERVOUS SYSTEM: A&Ox3, no focal deficits  SKIN: No rashes or lesions
Chantal Slaughter  (YONATHAN)  2023 10:05:39

## 2025-06-13 NOTE — ASU PATIENT PROFILE, ADULT - NSICDXPASTMEDICALHX_GEN_ALL_CORE_FT
PAST MEDICAL HISTORY:  Acid reflux     Afib PT Stated dont agree, PT takes ASA and Eliquis    BP (high blood pressure)     Diabetes     Elevated cholesterol with high triglycerides     High cholesterol     Pancreatitis

## 2025-06-15 LAB
-  AMOXICILLIN/CLAVULANIC ACID: SIGNIFICANT CHANGE UP
-  AMPICILLIN/SULBACTAM: SIGNIFICANT CHANGE UP
-  AMPICILLIN: SIGNIFICANT CHANGE UP
-  AZTREONAM: SIGNIFICANT CHANGE UP
-  CEFAZOLIN: SIGNIFICANT CHANGE UP
-  CEFEPIME: SIGNIFICANT CHANGE UP
-  CEFOXITIN: SIGNIFICANT CHANGE UP
-  CEFTRIAXONE: SIGNIFICANT CHANGE UP
-  CIPROFLOXACIN: SIGNIFICANT CHANGE UP
-  ERTAPENEM: SIGNIFICANT CHANGE UP
-  GENTAMICIN: SIGNIFICANT CHANGE UP
-  IMIPENEM: SIGNIFICANT CHANGE UP
-  LEVOFLOXACIN: SIGNIFICANT CHANGE UP
-  MEROPENEM: SIGNIFICANT CHANGE UP
-  PIPERACILLIN/TAZOBACTAM: SIGNIFICANT CHANGE UP
-  TIGECYCLINE: SIGNIFICANT CHANGE UP
-  TOBRAMYCIN: SIGNIFICANT CHANGE UP
-  TRIMETHOPRIM/SULFAMETHOXAZOLE: SIGNIFICANT CHANGE UP
GRAM STN FLD: ABNORMAL
METHOD TYPE: SIGNIFICANT CHANGE UP

## 2025-06-16 DIAGNOSIS — Z46.82 ENCOUNTER FOR FITTING AND ADJUSTMENT OF NON-VASCULAR CATHETER: ICD-10-CM

## 2025-06-17 PROBLEM — I48.91 UNSPECIFIED ATRIAL FIBRILLATION: Chronic | Status: ACTIVE | Noted: 2025-06-13

## 2025-06-17 PROBLEM — E11.9 TYPE 2 DIABETES MELLITUS WITHOUT COMPLICATIONS: Chronic | Status: ACTIVE | Noted: 2025-06-13

## 2025-06-18 LAB
CULTURE RESULTS: ABNORMAL
ORGANISM # SPEC MICROSCOPIC CNT: ABNORMAL
ORGANISM # SPEC MICROSCOPIC CNT: SIGNIFICANT CHANGE UP
SPECIMEN SOURCE: SIGNIFICANT CHANGE UP

## 2025-06-20 ENCOUNTER — TRANSCRIPTION ENCOUNTER (OUTPATIENT)
Age: 75
End: 2025-06-20

## 2025-06-20 ENCOUNTER — OUTPATIENT (OUTPATIENT)
Dept: OUTPATIENT SERVICES | Facility: HOSPITAL | Age: 75
LOS: 1 days | Discharge: HOME | End: 2025-06-20
Payer: MEDICARE

## 2025-06-20 VITALS
RESPIRATION RATE: 20 BRPM | SYSTOLIC BLOOD PRESSURE: 155 MMHG | DIASTOLIC BLOOD PRESSURE: 97 MMHG | OXYGEN SATURATION: 97 % | TEMPERATURE: 97 F | HEART RATE: 59 BPM

## 2025-06-20 VITALS
HEART RATE: 57 BPM | RESPIRATION RATE: 21 BRPM | DIASTOLIC BLOOD PRESSURE: 72 MMHG | OXYGEN SATURATION: 95 % | TEMPERATURE: 97 F | SYSTOLIC BLOOD PRESSURE: 156 MMHG

## 2025-06-20 PROCEDURE — 74150 CT ABDOMEN W/O CONTRAST: CPT

## 2025-06-20 PROCEDURE — 74150 CT ABDOMEN W/O CONTRAST: CPT | Mod: 26

## 2025-06-20 RX ORDER — CEFAZOLIN SODIUM IN 0.9 % NACL 3 G/100 ML
1000 INTRAVENOUS SOLUTION, PIGGYBACK (ML) INTRAVENOUS ONCE
Refills: 0 | Status: DISCONTINUED | OUTPATIENT
Start: 2025-06-20 | End: 2025-06-20

## 2025-06-20 NOTE — PRE PROCEDURE NOTE - PRE PROCEDURE EVALUATION
Vascular & Interventional Radiology Pre-Procedure Note    Procedure Name: diagnostic CT with possible drainage removal with possible sedation    Allergies: No Known Allergies    Medications (Abx/Cardiac/Anticoagulation/Blood Products)      Exam  General: NAD, AAO x3, no distress  Chest: breathing comfortably on room air, CTAB    Radiology & Additional Studies: Radiology imaging reviewed.     Consentable: [X ] Yes   [ ] No     Plan:   -74y Male presents for diagnostic CT with possible drainage removal with possible sedation  -Risks/Benefits/alternatives explained with the patient and/or healthcare proxy and witnessed informed consent obtained.

## 2025-06-20 NOTE — ASU DISCHARGE PLAN (ADULT/PEDIATRIC) - ASU DC SPECIAL INSTRUCTIONSFT
Follow u p with private MD- imaging in 4 weeks time.    If no PMD, get appointment with Dr Guillory/Rach  [phone] Home

## 2025-06-20 NOTE — CHART NOTE - NSCHARTNOTEFT_GEN_A_CORE
Patient day #7 s/p hepatic abscess drain placement.    No output- repeat CT w resolved collection.    Catheter removed without incident.    F/U w private MD.

## 2025-06-20 NOTE — ASU DISCHARGE PLAN (ADULT/PEDIATRIC) - FINANCIAL ASSISTANCE
[facility] provides services at a reduced cost to those who are determined to be eligible through [facility]’s financial assistance program. Information regarding [facility]’s financial assistance program can be found by going to [email or web address] or by calling [phone].